# Patient Record
Sex: MALE | Race: WHITE | NOT HISPANIC OR LATINO | Employment: UNEMPLOYED | URBAN - METROPOLITAN AREA
[De-identification: names, ages, dates, MRNs, and addresses within clinical notes are randomized per-mention and may not be internally consistent; named-entity substitution may affect disease eponyms.]

---

## 2019-04-12 ENCOUNTER — HOSPITAL ENCOUNTER (INPATIENT)
Facility: MEDICAL CENTER | Age: 44
LOS: 2 days | DRG: 377 | End: 2019-04-14
Attending: EMERGENCY MEDICINE | Admitting: HOSPITALIST
Payer: COMMERCIAL

## 2019-04-12 DIAGNOSIS — K92.0 HEMATEMESIS WITH NAUSEA: ICD-10-CM

## 2019-04-12 DIAGNOSIS — K92.2 UPPER GI BLEED: ICD-10-CM

## 2019-04-12 LAB
ALBUMIN SERPL BCP-MCNC: 3.2 G/DL (ref 3.2–4.9)
ALBUMIN/GLOB SERPL: 0.9 G/DL
ALP SERPL-CCNC: 75 U/L (ref 30–99)
ALT SERPL-CCNC: 22 U/L (ref 2–50)
ANION GAP SERPL CALC-SCNC: 11 MMOL/L (ref 0–11.9)
AST SERPL-CCNC: 46 U/L (ref 12–45)
BASOPHILS # BLD AUTO: 0.1 % (ref 0–1.8)
BASOPHILS # BLD: 0.01 K/UL (ref 0–0.12)
BILIRUB SERPL-MCNC: 2.6 MG/DL (ref 0.1–1.5)
BUN SERPL-MCNC: 17 MG/DL (ref 8–22)
CALCIUM SERPL-MCNC: 7.8 MG/DL (ref 8.5–10.5)
CHLORIDE SERPL-SCNC: 103 MMOL/L (ref 96–112)
CO2 SERPL-SCNC: 22 MMOL/L (ref 20–33)
CREAT SERPL-MCNC: 0.57 MG/DL (ref 0.5–1.4)
EKG IMPRESSION: NORMAL
EOSINOPHIL # BLD AUTO: 0.01 K/UL (ref 0–0.51)
EOSINOPHIL NFR BLD: 0.1 % (ref 0–6.9)
ERYTHROCYTE [DISTWIDTH] IN BLOOD BY AUTOMATED COUNT: 44.7 FL (ref 35.9–50)
GLOBULIN SER CALC-MCNC: 3.4 G/DL (ref 1.9–3.5)
GLUCOSE SERPL-MCNC: 151 MG/DL (ref 65–99)
HCT VFR BLD AUTO: 31.7 % (ref 42–52)
HGB BLD-MCNC: 11.1 G/DL (ref 14–18)
IMM GRANULOCYTES # BLD AUTO: 0.02 K/UL (ref 0–0.11)
IMM GRANULOCYTES NFR BLD AUTO: 0.2 % (ref 0–0.9)
LIPASE SERPL-CCNC: 24 U/L (ref 11–82)
LYMPHOCYTES # BLD AUTO: 0.59 K/UL (ref 1–4.8)
LYMPHOCYTES NFR BLD: 6.2 % (ref 22–41)
MCH RBC QN AUTO: 29.3 PG (ref 27–33)
MCHC RBC AUTO-ENTMCNC: 35 G/DL (ref 33.7–35.3)
MCV RBC AUTO: 83.6 FL (ref 81.4–97.8)
MONOCYTES # BLD AUTO: 0.35 K/UL (ref 0–0.85)
MONOCYTES NFR BLD AUTO: 3.6 % (ref 0–13.4)
NEUTROPHILS # BLD AUTO: 8.61 K/UL (ref 1.82–7.42)
NEUTROPHILS NFR BLD: 89.8 % (ref 44–72)
NRBC # BLD AUTO: 0 K/UL
NRBC BLD-RTO: 0 /100 WBC
PLATELET # BLD AUTO: 64 K/UL (ref 164–446)
PMV BLD AUTO: 10.7 FL (ref 9–12.9)
POTASSIUM SERPL-SCNC: 4.2 MMOL/L (ref 3.6–5.5)
PROT SERPL-MCNC: 6.6 G/DL (ref 6–8.2)
RBC # BLD AUTO: 3.79 M/UL (ref 4.7–6.1)
SODIUM SERPL-SCNC: 136 MMOL/L (ref 135–145)
WBC # BLD AUTO: 9.6 K/UL (ref 4.8–10.8)

## 2019-04-12 PROCEDURE — 80048 BASIC METABOLIC PNL TOTAL CA: CPT

## 2019-04-12 PROCEDURE — 96368 THER/DIAG CONCURRENT INF: CPT

## 2019-04-12 PROCEDURE — 700111 HCHG RX REV CODE 636 W/ 250 OVERRIDE (IP): Performed by: EMERGENCY MEDICINE

## 2019-04-12 PROCEDURE — 96367 TX/PROPH/DG ADDL SEQ IV INF: CPT

## 2019-04-12 PROCEDURE — 84100 ASSAY OF PHOSPHORUS: CPT

## 2019-04-12 PROCEDURE — 96366 THER/PROPH/DIAG IV INF ADDON: CPT

## 2019-04-12 PROCEDURE — 770020 HCHG ROOM/CARE - TELE (206)

## 2019-04-12 PROCEDURE — 85610 PROTHROMBIN TIME: CPT

## 2019-04-12 PROCEDURE — 85576 BLOOD PLATELET AGGREGATION: CPT

## 2019-04-12 PROCEDURE — 99285 EMERGENCY DEPT VISIT HI MDM: CPT

## 2019-04-12 PROCEDURE — 93005 ELECTROCARDIOGRAM TRACING: CPT

## 2019-04-12 PROCEDURE — C9113 INJ PANTOPRAZOLE SODIUM, VIA: HCPCS | Performed by: EMERGENCY MEDICINE

## 2019-04-12 PROCEDURE — 80053 COMPREHEN METABOLIC PANEL: CPT

## 2019-04-12 PROCEDURE — 85347 COAGULATION TIME ACTIVATED: CPT

## 2019-04-12 PROCEDURE — 93005 ELECTROCARDIOGRAM TRACING: CPT | Performed by: EMERGENCY MEDICINE

## 2019-04-12 PROCEDURE — 85025 COMPLETE CBC W/AUTO DIFF WBC: CPT

## 2019-04-12 PROCEDURE — 83735 ASSAY OF MAGNESIUM: CPT

## 2019-04-12 PROCEDURE — 85384 FIBRINOGEN ACTIVITY: CPT

## 2019-04-12 PROCEDURE — 700105 HCHG RX REV CODE 258: Performed by: EMERGENCY MEDICINE

## 2019-04-12 PROCEDURE — 96365 THER/PROPH/DIAG IV INF INIT: CPT

## 2019-04-12 PROCEDURE — 36415 COLL VENOUS BLD VENIPUNCTURE: CPT

## 2019-04-12 PROCEDURE — 83690 ASSAY OF LIPASE: CPT

## 2019-04-12 PROCEDURE — 85027 COMPLETE CBC AUTOMATED: CPT

## 2019-04-12 RX ORDER — LORAZEPAM 2 MG/ML
0.5 INJECTION INTRAMUSCULAR EVERY 4 HOURS PRN
Status: DISCONTINUED | OUTPATIENT
Start: 2019-04-12 | End: 2019-04-14

## 2019-04-12 RX ORDER — LORAZEPAM 2 MG/ML
1.5 INJECTION INTRAMUSCULAR
Status: DISCONTINUED | OUTPATIENT
Start: 2019-04-12 | End: 2019-04-14

## 2019-04-12 RX ORDER — SODIUM CHLORIDE, SODIUM LACTATE, POTASSIUM CHLORIDE, CALCIUM CHLORIDE 600; 310; 30; 20 MG/100ML; MG/100ML; MG/100ML; MG/100ML
INJECTION, SOLUTION INTRAVENOUS CONTINUOUS
Status: DISCONTINUED | OUTPATIENT
Start: 2019-04-12 | End: 2019-04-14

## 2019-04-12 RX ORDER — LORAZEPAM 2 MG/1
4 TABLET ORAL
Status: DISCONTINUED | OUTPATIENT
Start: 2019-04-12 | End: 2019-04-14

## 2019-04-12 RX ORDER — LORAZEPAM 1 MG/1
1 TABLET ORAL EVERY 4 HOURS PRN
Status: DISCONTINUED | OUTPATIENT
Start: 2019-04-12 | End: 2019-04-14

## 2019-04-12 RX ORDER — LORAZEPAM 2 MG/ML
1 INJECTION INTRAMUSCULAR
Status: DISCONTINUED | OUTPATIENT
Start: 2019-04-12 | End: 2019-04-14

## 2019-04-12 RX ORDER — ONDANSETRON 4 MG/1
4 TABLET, ORALLY DISINTEGRATING ORAL EVERY 4 HOURS PRN
Status: DISCONTINUED | OUTPATIENT
Start: 2019-04-12 | End: 2019-04-14

## 2019-04-12 RX ORDER — POLYETHYLENE GLYCOL 3350 17 G/17G
1 POWDER, FOR SOLUTION ORAL
Status: DISCONTINUED | OUTPATIENT
Start: 2019-04-12 | End: 2019-04-14 | Stop reason: HOSPADM

## 2019-04-12 RX ORDER — PROMETHAZINE HYDROCHLORIDE 25 MG/1
12.5-25 SUPPOSITORY RECTAL EVERY 4 HOURS PRN
Status: DISCONTINUED | OUTPATIENT
Start: 2019-04-12 | End: 2019-04-14

## 2019-04-12 RX ORDER — ONDANSETRON 2 MG/ML
4 INJECTION INTRAMUSCULAR; INTRAVENOUS EVERY 4 HOURS PRN
Status: DISCONTINUED | OUTPATIENT
Start: 2019-04-12 | End: 2019-04-14 | Stop reason: HOSPADM

## 2019-04-12 RX ORDER — LORAZEPAM 1 MG/1
0.5 TABLET ORAL EVERY 4 HOURS PRN
Status: DISCONTINUED | OUTPATIENT
Start: 2019-04-12 | End: 2019-04-14

## 2019-04-12 RX ORDER — LORAZEPAM 2 MG/ML
2 INJECTION INTRAMUSCULAR
Status: DISCONTINUED | OUTPATIENT
Start: 2019-04-12 | End: 2019-04-14

## 2019-04-12 RX ORDER — SODIUM CHLORIDE 9 MG/ML
1000 INJECTION, SOLUTION INTRAVENOUS ONCE
Status: COMPLETED | OUTPATIENT
Start: 2019-04-12 | End: 2019-04-12

## 2019-04-12 RX ORDER — SODIUM CHLORIDE 9 MG/ML
INJECTION, SOLUTION INTRAVENOUS CONTINUOUS
Status: DISPENSED | OUTPATIENT
Start: 2019-04-13 | End: 2019-04-13

## 2019-04-12 RX ORDER — FOLIC ACID 1 MG/1
1 TABLET ORAL DAILY
Status: DISCONTINUED | OUTPATIENT
Start: 2019-04-13 | End: 2019-04-14 | Stop reason: HOSPADM

## 2019-04-12 RX ORDER — AMOXICILLIN 250 MG
2 CAPSULE ORAL 2 TIMES DAILY
Status: DISCONTINUED | OUTPATIENT
Start: 2019-04-12 | End: 2019-04-14 | Stop reason: HOSPADM

## 2019-04-12 RX ORDER — PROMETHAZINE HYDROCHLORIDE 25 MG/1
12.5-25 TABLET ORAL EVERY 4 HOURS PRN
Status: DISCONTINUED | OUTPATIENT
Start: 2019-04-12 | End: 2019-04-14

## 2019-04-12 RX ORDER — LORAZEPAM 2 MG/1
2 TABLET ORAL
Status: DISCONTINUED | OUTPATIENT
Start: 2019-04-12 | End: 2019-04-14

## 2019-04-12 RX ORDER — BISACODYL 10 MG
10 SUPPOSITORY, RECTAL RECTAL
Status: DISCONTINUED | OUTPATIENT
Start: 2019-04-12 | End: 2019-04-14 | Stop reason: HOSPADM

## 2019-04-12 RX ORDER — THIAMINE MONONITRATE (VIT B1) 100 MG
100 TABLET ORAL DAILY
Status: DISCONTINUED | OUTPATIENT
Start: 2019-04-13 | End: 2019-04-14 | Stop reason: HOSPADM

## 2019-04-12 RX ADMIN — SODIUM CHLORIDE 8 MG/HR: 9 INJECTION, SOLUTION INTRAVENOUS at 21:26

## 2019-04-12 RX ADMIN — SODIUM CHLORIDE 1000 ML: 9 INJECTION, SOLUTION INTRAVENOUS at 21:00

## 2019-04-12 RX ADMIN — OCTREOTIDE ACETATE 50 MCG/HR: 200 INJECTION, SOLUTION INTRAVENOUS; SUBCUTANEOUS at 21:25

## 2019-04-12 RX ADMIN — CEFTRIAXONE SODIUM 2 G: 2 INJECTION, POWDER, FOR SOLUTION INTRAMUSCULAR; INTRAVENOUS at 21:01

## 2019-04-12 ASSESSMENT — COGNITIVE AND FUNCTIONAL STATUS - GENERAL
MOBILITY SCORE: 24
SUGGESTED CMS G CODE MODIFIER MOBILITY: CH
DAILY ACTIVITIY SCORE: 24
SUGGESTED CMS G CODE MODIFIER DAILY ACTIVITY: CH

## 2019-04-12 ASSESSMENT — ENCOUNTER SYMPTOMS
HEADACHES: 0
TINGLING: 0
FEVER: 0
CHILLS: 0
VOMITING: 1
ABDOMINAL PAIN: 0
NAUSEA: 1
DIZZINESS: 0

## 2019-04-12 ASSESSMENT — PATIENT HEALTH QUESTIONNAIRE - PHQ9
SUM OF ALL RESPONSES TO PHQ9 QUESTIONS 1 AND 2: 0
2. FEELING DOWN, DEPRESSED, IRRITABLE, OR HOPELESS: NOT AT ALL
1. LITTLE INTEREST OR PLEASURE IN DOING THINGS: NOT AT ALL

## 2019-04-12 ASSESSMENT — COPD QUESTIONNAIRES
DO YOU EVER COUGH UP ANY MUCUS OR PHLEGM?: NO/ONLY WITH OCCASIONAL COLDS OR INFECTIONS
IN THE PAST 12 MONTHS DO YOU DO LESS THAN YOU USED TO BECAUSE OF YOUR BREATHING PROBLEMS: DISAGREE/UNSURE
HAVE YOU SMOKED AT LEAST 100 CIGARETTES IN YOUR ENTIRE LIFE: NO/DON'T KNOW

## 2019-04-12 ASSESSMENT — LIFESTYLE VARIABLES
DO YOU DRINK ALCOHOL: NO
ALCOHOL_USE: NO

## 2019-04-12 NOTE — LETTER
[facility]  TELEMETRY [facility] [address] [place] [address] [facility] [address]  [phone]     April 14, 2019    Patient: Noam Choi   YOB: 1975   Date of Visit: 4/12/2019       To Whom It May Concern:    Noam Choi was seen and treated in our department on 4/12/2019-4/14/2019.Please allow patient 2 days, until 4/17/2019, before returning to work to help his recovery.    Sincerely,     Vicente Quiroga M.D.

## 2019-04-12 NOTE — LETTER
[facility]  [facility]  [address] [facility] [address]  [phone]     April 14, 2019    Patient: Noam Choi   YOB: 1975   Date of Visit: 4/12/2019       To Whom It May Concern:    Noam Choi was seen and treated in our department on 4/12/2019-4/14/2019. Please allow him to return to work after 4/19/2019 to help with his recovery, but if he improves faster than expected he may return to work sooner with activity as tolerated.    Sincerely,     Vicente Quiroga M.D.

## 2019-04-13 ENCOUNTER — ANESTHESIA EVENT (OUTPATIENT)
Dept: SURGERY | Facility: MEDICAL CENTER | Age: 44
DRG: 377 | End: 2019-04-13
Payer: COMMERCIAL

## 2019-04-13 ENCOUNTER — ANESTHESIA (OUTPATIENT)
Dept: SURGERY | Facility: MEDICAL CENTER | Age: 44
DRG: 377 | End: 2019-04-13
Payer: COMMERCIAL

## 2019-04-13 PROBLEM — D50.0 NORMOCYTIC ANEMIA DUE TO BLOOD LOSS: Status: ACTIVE | Noted: 2019-04-13

## 2019-04-13 PROBLEM — E83.42 HYPOMAGNESEMIA: Status: ACTIVE | Noted: 2019-04-13

## 2019-04-13 PROBLEM — D69.6 THROMBOCYTOPENIA (HCC): Status: ACTIVE | Noted: 2019-04-13

## 2019-04-13 PROBLEM — F10.939 WITHDRAWAL SYMPTOMS, ALCOHOL (HCC): Status: ACTIVE | Noted: 2019-04-13

## 2019-04-13 PROBLEM — F10.10 ALCOHOL ABUSE: Status: ACTIVE | Noted: 2019-04-13

## 2019-04-13 PROBLEM — K92.2 GI BLEED: Status: ACTIVE | Noted: 2019-04-13

## 2019-04-13 LAB
ABO GROUP BLD: NORMAL
ABO GROUP BLD: NORMAL
ANION GAP SERPL CALC-SCNC: 10 MMOL/L (ref 0–11.9)
BARCODED ABORH UBTYP: 6200
BARCODED PRD CODE UBPRD: NORMAL
BARCODED UNIT NUM UBUNT: NORMAL
BLD GP AB SCN SERPL QL: NORMAL
BUN SERPL-MCNC: 17 MG/DL (ref 8–22)
CALCIUM SERPL-MCNC: 7.1 MG/DL (ref 8.5–10.5)
CFT BLD TEG: 6.2 MIN (ref 5–10)
CHLORIDE SERPL-SCNC: 107 MMOL/L (ref 96–112)
CLOT ANGLE BLD TEG: 52.9 DEGREES (ref 53–72)
CLOT LYSIS 30M P MA LENFR BLD TEG: 0 % (ref 0–8)
CO2 SERPL-SCNC: 21 MMOL/L (ref 20–33)
COMPONENT P 8504P: NORMAL
CREAT SERPL-MCNC: 0.56 MG/DL (ref 0.5–1.4)
CT.EXTRINSIC BLD ROTEM: 3.8 MIN (ref 1–3)
ERYTHROCYTE [DISTWIDTH] IN BLOOD BY AUTOMATED COUNT: 45.2 FL (ref 35.9–50)
ERYTHROCYTE [DISTWIDTH] IN BLOOD BY AUTOMATED COUNT: 47.1 FL (ref 35.9–50)
ERYTHROCYTE [DISTWIDTH] IN BLOOD BY AUTOMATED COUNT: 48.6 FL (ref 35.9–50)
ERYTHROCYTE [DISTWIDTH] IN BLOOD BY AUTOMATED COUNT: 49.2 FL (ref 35.9–50)
GLUCOSE SERPL-MCNC: 122 MG/DL (ref 65–99)
HCT VFR BLD AUTO: 24.6 % (ref 42–52)
HCT VFR BLD AUTO: 25.6 % (ref 42–52)
HCT VFR BLD AUTO: 26.3 % (ref 42–52)
HCT VFR BLD AUTO: 27.9 % (ref 42–52)
HGB BLD-MCNC: 8.5 G/DL (ref 14–18)
HGB BLD-MCNC: 8.8 G/DL (ref 14–18)
HGB BLD-MCNC: 9 G/DL (ref 14–18)
HGB BLD-MCNC: 9.6 G/DL (ref 14–18)
INR PPP: 1.8 (ref 0.87–1.13)
MAGNESIUM SERPL-MCNC: 1.4 MG/DL (ref 1.5–2.5)
MCF BLD TEG: 44.9 MM (ref 50–70)
MCH RBC QN AUTO: 28.9 PG (ref 27–33)
MCH RBC QN AUTO: 29.4 PG (ref 27–33)
MCH RBC QN AUTO: 29.5 PG (ref 27–33)
MCH RBC QN AUTO: 29.9 PG (ref 27–33)
MCHC RBC AUTO-ENTMCNC: 34.2 G/DL (ref 33.7–35.3)
MCHC RBC AUTO-ENTMCNC: 34.4 G/DL (ref 33.7–35.3)
MCHC RBC AUTO-ENTMCNC: 34.4 G/DL (ref 33.7–35.3)
MCHC RBC AUTO-ENTMCNC: 34.6 G/DL (ref 33.7–35.3)
MCV RBC AUTO: 84 FL (ref 81.4–97.8)
MCV RBC AUTO: 85.4 FL (ref 81.4–97.8)
MCV RBC AUTO: 85.9 FL (ref 81.4–97.8)
MCV RBC AUTO: 87.1 FL (ref 81.4–97.8)
PHOSPHATE SERPL-MCNC: 2.5 MG/DL (ref 2.5–4.5)
PLATELET # BLD AUTO: 47 K/UL (ref 164–446)
PLATELET # BLD AUTO: 55 K/UL (ref 164–446)
PLATELET # BLD AUTO: 61 K/UL (ref 164–446)
PLATELET # BLD AUTO: 65 K/UL (ref 164–446)
PMV BLD AUTO: 10 FL (ref 9–12.9)
PMV BLD AUTO: 10 FL (ref 9–12.9)
PMV BLD AUTO: 10.1 FL (ref 9–12.9)
PMV BLD AUTO: 10.7 FL (ref 9–12.9)
POTASSIUM SERPL-SCNC: 4.1 MMOL/L (ref 3.6–5.5)
PRODUCT TYPE UPROD: NORMAL
PROTHROMBIN TIME: 21 SEC (ref 12–14.6)
RBC # BLD AUTO: 2.88 M/UL (ref 4.7–6.1)
RBC # BLD AUTO: 2.94 M/UL (ref 4.7–6.1)
RBC # BLD AUTO: 3.06 M/UL (ref 4.7–6.1)
RBC # BLD AUTO: 3.32 M/UL (ref 4.7–6.1)
RH BLD: NORMAL
RH BLD: NORMAL
SODIUM SERPL-SCNC: 138 MMOL/L (ref 135–145)
TEG ALGORITHM TGALG: ABNORMAL
UNIT STATUS USTAT: NORMAL
WBC # BLD AUTO: 5.3 K/UL (ref 4.8–10.8)
WBC # BLD AUTO: 5.6 K/UL (ref 4.8–10.8)
WBC # BLD AUTO: 5.9 K/UL (ref 4.8–10.8)
WBC # BLD AUTO: 6.8 K/UL (ref 4.8–10.8)

## 2019-04-13 PROCEDURE — 86850 RBC ANTIBODY SCREEN: CPT

## 2019-04-13 PROCEDURE — 700105 HCHG RX REV CODE 258: Performed by: INTERNAL MEDICINE

## 2019-04-13 PROCEDURE — HZ2ZZZZ DETOXIFICATION SERVICES FOR SUBSTANCE ABUSE TREATMENT: ICD-10-PCS | Performed by: HOSPITALIST

## 2019-04-13 PROCEDURE — 160009 HCHG ANES TIME/MIN: Performed by: INTERNAL MEDICINE

## 2019-04-13 PROCEDURE — 700105 HCHG RX REV CODE 258: Performed by: ANESTHESIOLOGY

## 2019-04-13 PROCEDURE — A9270 NON-COVERED ITEM OR SERVICE: HCPCS | Performed by: STUDENT IN AN ORGANIZED HEALTH CARE EDUCATION/TRAINING PROGRAM

## 2019-04-13 PROCEDURE — 84100 ASSAY OF PHOSPHORUS: CPT

## 2019-04-13 PROCEDURE — 700111 HCHG RX REV CODE 636 W/ 250 OVERRIDE (IP): Performed by: STUDENT IN AN ORGANIZED HEALTH CARE EDUCATION/TRAINING PROGRAM

## 2019-04-13 PROCEDURE — 83735 ASSAY OF MAGNESIUM: CPT

## 2019-04-13 PROCEDURE — P9034 PLATELETS, PHERESIS: HCPCS

## 2019-04-13 PROCEDURE — 700101 HCHG RX REV CODE 250: Performed by: STUDENT IN AN ORGANIZED HEALTH CARE EDUCATION/TRAINING PROGRAM

## 2019-04-13 PROCEDURE — 30233R1 TRANSFUSION OF NONAUTOLOGOUS PLATELETS INTO PERIPHERAL VEIN, PERCUTANEOUS APPROACH: ICD-10-PCS | Performed by: HOSPITALIST

## 2019-04-13 PROCEDURE — 0DL48ZZ OCCLUSION OF ESOPHAGOGASTRIC JUNCTION, VIA NATURAL OR ARTIFICIAL OPENING ENDOSCOPIC: ICD-10-PCS | Performed by: INTERNAL MEDICINE

## 2019-04-13 PROCEDURE — 160035 HCHG PACU - 1ST 60 MINS PHASE I: Performed by: INTERNAL MEDICINE

## 2019-04-13 PROCEDURE — 80053 COMPREHEN METABOLIC PANEL: CPT

## 2019-04-13 PROCEDURE — 94760 N-INVAS EAR/PLS OXIMETRY 1: CPT

## 2019-04-13 PROCEDURE — 160048 HCHG OR STATISTICAL LEVEL 1-5: Performed by: INTERNAL MEDICINE

## 2019-04-13 PROCEDURE — 700111 HCHG RX REV CODE 636 W/ 250 OVERRIDE (IP): Performed by: ANESTHESIOLOGY

## 2019-04-13 PROCEDURE — 86900 BLOOD TYPING SEROLOGIC ABO: CPT

## 2019-04-13 PROCEDURE — 700105 HCHG RX REV CODE 258: Performed by: EMERGENCY MEDICINE

## 2019-04-13 PROCEDURE — 700105 HCHG RX REV CODE 258: Performed by: STUDENT IN AN ORGANIZED HEALTH CARE EDUCATION/TRAINING PROGRAM

## 2019-04-13 PROCEDURE — 700111 HCHG RX REV CODE 636 W/ 250 OVERRIDE (IP): Performed by: EMERGENCY MEDICINE

## 2019-04-13 PROCEDURE — 700111 HCHG RX REV CODE 636 W/ 250 OVERRIDE (IP): Performed by: INTERNAL MEDICINE

## 2019-04-13 PROCEDURE — 36415 COLL VENOUS BLD VENIPUNCTURE: CPT

## 2019-04-13 PROCEDURE — 770020 HCHG ROOM/CARE - TELE (206)

## 2019-04-13 PROCEDURE — 700102 HCHG RX REV CODE 250 W/ 637 OVERRIDE(OP): Performed by: STUDENT IN AN ORGANIZED HEALTH CARE EDUCATION/TRAINING PROGRAM

## 2019-04-13 PROCEDURE — 502240 HCHG MISC OR SUPPLY RC 0272: Performed by: INTERNAL MEDICINE

## 2019-04-13 PROCEDURE — C9113 INJ PANTOPRAZOLE SODIUM, VIA: HCPCS | Performed by: EMERGENCY MEDICINE

## 2019-04-13 PROCEDURE — 500066 HCHG BITE BLOCK, ECT: Performed by: INTERNAL MEDICINE

## 2019-04-13 PROCEDURE — 160036 HCHG PACU - EA ADDL 30 MINS PHASE I: Performed by: INTERNAL MEDICINE

## 2019-04-13 PROCEDURE — 160029 HCHG SURGERY MINUTES - 1ST 30 MINS LEVEL 4: Performed by: INTERNAL MEDICINE

## 2019-04-13 PROCEDURE — 85027 COMPLETE CBC AUTOMATED: CPT | Mod: 91

## 2019-04-13 PROCEDURE — 160002 HCHG RECOVERY MINUTES (STAT): Performed by: INTERNAL MEDICINE

## 2019-04-13 PROCEDURE — 36430 TRANSFUSION BLD/BLD COMPNT: CPT

## 2019-04-13 PROCEDURE — 700101 HCHG RX REV CODE 250: Performed by: ANESTHESIOLOGY

## 2019-04-13 PROCEDURE — 99223 1ST HOSP IP/OBS HIGH 75: CPT | Mod: GC | Performed by: HOSPITALIST

## 2019-04-13 PROCEDURE — 86901 BLOOD TYPING SEROLOGIC RH(D): CPT | Mod: 91

## 2019-04-13 RX ORDER — MIDAZOLAM HYDROCHLORIDE 1 MG/ML
1 INJECTION INTRAMUSCULAR; INTRAVENOUS
Status: DISCONTINUED | OUTPATIENT
Start: 2019-04-13 | End: 2019-04-13 | Stop reason: HOSPADM

## 2019-04-13 RX ORDER — HYDRALAZINE HYDROCHLORIDE 20 MG/ML
5 INJECTION INTRAMUSCULAR; INTRAVENOUS
Status: DISCONTINUED | OUTPATIENT
Start: 2019-04-13 | End: 2019-04-13 | Stop reason: HOSPADM

## 2019-04-13 RX ORDER — MEPERIDINE HYDROCHLORIDE 25 MG/ML
12.5 INJECTION INTRAMUSCULAR; INTRAVENOUS; SUBCUTANEOUS
Status: DISCONTINUED | OUTPATIENT
Start: 2019-04-13 | End: 2019-04-13 | Stop reason: HOSPADM

## 2019-04-13 RX ORDER — ONDANSETRON 2 MG/ML
4 INJECTION INTRAMUSCULAR; INTRAVENOUS
Status: DISCONTINUED | OUTPATIENT
Start: 2019-04-13 | End: 2019-04-13 | Stop reason: HOSPADM

## 2019-04-13 RX ORDER — SODIUM CHLORIDE, SODIUM LACTATE, POTASSIUM CHLORIDE, CALCIUM CHLORIDE 600; 310; 30; 20 MG/100ML; MG/100ML; MG/100ML; MG/100ML
INJECTION, SOLUTION INTRAVENOUS CONTINUOUS
Status: DISCONTINUED | OUTPATIENT
Start: 2019-04-13 | End: 2019-04-13 | Stop reason: HOSPADM

## 2019-04-13 RX ORDER — MAGNESIUM SULFATE HEPTAHYDRATE 40 MG/ML
4 INJECTION, SOLUTION INTRAVENOUS ONCE
Status: COMPLETED | OUTPATIENT
Start: 2019-04-13 | End: 2019-04-13

## 2019-04-13 RX ORDER — HALOPERIDOL 5 MG/ML
1 INJECTION INTRAMUSCULAR
Status: DISCONTINUED | OUTPATIENT
Start: 2019-04-13 | End: 2019-04-13 | Stop reason: HOSPADM

## 2019-04-13 RX ORDER — SODIUM CHLORIDE, SODIUM LACTATE, POTASSIUM CHLORIDE, CALCIUM CHLORIDE 600; 310; 30; 20 MG/100ML; MG/100ML; MG/100ML; MG/100ML
INJECTION, SOLUTION INTRAVENOUS
Status: DISCONTINUED | OUTPATIENT
Start: 2019-04-13 | End: 2019-04-13 | Stop reason: SURG

## 2019-04-13 RX ORDER — ONDANSETRON 2 MG/ML
INJECTION INTRAMUSCULAR; INTRAVENOUS PRN
Status: DISCONTINUED | OUTPATIENT
Start: 2019-04-13 | End: 2019-04-13 | Stop reason: SURG

## 2019-04-13 RX ORDER — DIPHENHYDRAMINE HYDROCHLORIDE 50 MG/ML
12.5 INJECTION INTRAMUSCULAR; INTRAVENOUS
Status: DISCONTINUED | OUTPATIENT
Start: 2019-04-13 | End: 2019-04-13 | Stop reason: HOSPADM

## 2019-04-13 RX ADMIN — SODIUM CHLORIDE: 9 INJECTION, SOLUTION INTRAVENOUS at 01:08

## 2019-04-13 RX ADMIN — MAGNESIUM SULFATE IN WATER 4 G: 40 INJECTION, SOLUTION INTRAVENOUS at 08:24

## 2019-04-13 RX ADMIN — PROPOFOL 50 MG: 10 INJECTION, EMULSION INTRAVENOUS at 10:30

## 2019-04-13 RX ADMIN — PROPOFOL 80 MG: 10 INJECTION, EMULSION INTRAVENOUS at 10:25

## 2019-04-13 RX ADMIN — ONDANSETRON 4 MG: 2 INJECTION INTRAMUSCULAR; INTRAVENOUS at 20:18

## 2019-04-13 RX ADMIN — DIBASIC SODIUM PHOSPHATE, MONOBASIC POTASSIUM PHOSPHATE AND MONOBASIC SODIUM PHOSPHATE 2 TABLET: 852; 155; 130 TABLET ORAL at 08:21

## 2019-04-13 RX ADMIN — PROPOFOL 100 MG: 10 INJECTION, EMULSION INTRAVENOUS at 10:26

## 2019-04-13 RX ADMIN — SODIUM CHLORIDE, POTASSIUM CHLORIDE, SODIUM LACTATE AND CALCIUM CHLORIDE: 600; 310; 30; 20 INJECTION, SOLUTION INTRAVENOUS at 08:14

## 2019-04-13 RX ADMIN — OCTREOTIDE ACETATE 50 MCG/HR: 200 INJECTION, SOLUTION INTRAVENOUS; SUBCUTANEOUS at 23:33

## 2019-04-13 RX ADMIN — THIAMINE HYDROCHLORIDE: 100 INJECTION, SOLUTION INTRAMUSCULAR; INTRAVENOUS at 00:43

## 2019-04-13 RX ADMIN — DIBASIC SODIUM PHOSPHATE, MONOBASIC POTASSIUM PHOSPHATE AND MONOBASIC SODIUM PHOSPHATE 2 TABLET: 852; 155; 130 TABLET ORAL at 17:37

## 2019-04-13 RX ADMIN — THERA TABS 1 TABLET: TAB at 05:28

## 2019-04-13 RX ADMIN — CEFTRIAXONE SODIUM 2 G: 2 INJECTION, POWDER, FOR SOLUTION INTRAMUSCULAR; INTRAVENOUS at 20:16

## 2019-04-13 RX ADMIN — LORAZEPAM 1 MG: 1 TABLET ORAL at 20:17

## 2019-04-13 RX ADMIN — SODIUM CHLORIDE, POTASSIUM CHLORIDE, SODIUM LACTATE AND CALCIUM CHLORIDE: 600; 310; 30; 20 INJECTION, SOLUTION INTRAVENOUS at 10:00

## 2019-04-13 RX ADMIN — SODIUM CHLORIDE, POTASSIUM CHLORIDE, SODIUM LACTATE AND CALCIUM CHLORIDE: 600; 310; 30; 20 INJECTION, SOLUTION INTRAVENOUS at 00:43

## 2019-04-13 RX ADMIN — OCTREOTIDE ACETATE 50 MCG/HR: 200 INJECTION, SOLUTION INTRAVENOUS; SUBCUTANEOUS at 08:13

## 2019-04-13 RX ADMIN — ONDANSETRON 4 MG: 2 INJECTION INTRAMUSCULAR; INTRAVENOUS at 10:33

## 2019-04-13 RX ADMIN — FENTANYL CITRATE 50 MCG: 50 INJECTION, SOLUTION INTRAMUSCULAR; INTRAVENOUS at 11:14

## 2019-04-13 RX ADMIN — SODIUM CHLORIDE 8 MG/HR: 9 INJECTION, SOLUTION INTRAVENOUS at 17:59

## 2019-04-13 RX ADMIN — Medication 100 MG: at 05:28

## 2019-04-13 RX ADMIN — PROPOFOL 50 MG: 10 INJECTION, EMULSION INTRAVENOUS at 10:27

## 2019-04-13 RX ADMIN — FENTANYL CITRATE 50 MCG: 50 INJECTION, SOLUTION INTRAMUSCULAR; INTRAVENOUS at 11:10

## 2019-04-13 RX ADMIN — FOLIC ACID 1 MG: 1 TABLET ORAL at 05:28

## 2019-04-13 ASSESSMENT — LIFESTYLE VARIABLES
PAROXYSMAL SWEATS: BARELY PERCEPTIBLE SWEATING. PALMS MOIST
AUDITORY DISTURBANCES: NOT PRESENT
ORIENTATION AND CLOUDING OF SENSORIUM: ORIENTED AND CAN DO SERIAL ADDITIONS
TOTAL SCORE: 1
AUDITORY DISTURBANCES: NOT PRESENT
VISUAL DISTURBANCES: NOT PRESENT
VISUAL DISTURBANCES: NOT PRESENT
AGITATION: NORMAL ACTIVITY
ORIENTATION AND CLOUDING OF SENSORIUM: ORIENTED AND CAN DO SERIAL ADDITIONS
EVER_SMOKED: NEVER
VISUAL DISTURBANCES: NOT PRESENT
HEADACHE, FULLNESS IN HEAD: MILD
NAUSEA AND VOMITING: *
AUDITORY DISTURBANCES: NOT PRESENT
ORIENTATION AND CLOUDING OF SENSORIUM: ORIENTED AND CAN DO SERIAL ADDITIONS
NAUSEA AND VOMITING: *
PAROXYSMAL SWEATS: NO SWEAT VISIBLE
AGITATION: NORMAL ACTIVITY
NAUSEA AND VOMITING: *
VISUAL DISTURBANCES: NOT PRESENT
TOTAL SCORE: 3
HEADACHE, FULLNESS IN HEAD: NOT PRESENT
TREMOR: NO TREMOR
PAROXYSMAL SWEATS: NO SWEAT VISIBLE
ANXIETY: NO ANXIETY (AT EASE)
TREMOR: NO TREMOR
ANXIETY: NO ANXIETY (AT EASE)
HEADACHE, FULLNESS IN HEAD: NOT PRESENT
AUDITORY DISTURBANCES: NOT PRESENT
TOTAL SCORE: 2
ANXIETY: NO ANXIETY (AT EASE)
NAUSEA AND VOMITING: MILD NAUSEA WITH NO VOMITING
PAROXYSMAL SWEATS: NO SWEAT VISIBLE
TOTAL SCORE: 9
TREMOR: NO TREMOR
AGITATION: NORMAL ACTIVITY
AGITATION: NORMAL ACTIVITY
HEADACHE, FULLNESS IN HEAD: NOT PRESENT
TREMOR: NO TREMOR
ORIENTATION AND CLOUDING OF SENSORIUM: ORIENTED AND CAN DO SERIAL ADDITIONS
ANXIETY: MODERATELY ANXIOUS OR GUARDED, SO ANXIETY IS INFERRED

## 2019-04-13 ASSESSMENT — COPD QUESTIONNAIRES
COPD SCREENING SCORE: 1
HAVE YOU SMOKED AT LEAST 100 CIGARETTES IN YOUR ENTIRE LIFE: NO/DON'T KNOW
DO YOU EVER COUGH UP ANY MUCUS OR PHLEGM?: NO/ONLY WITH OCCASIONAL COLDS OR INFECTIONS
DURING THE PAST 4 WEEKS HOW MUCH DID YOU FEEL SHORT OF BREATH: NONE/LITTLE OF THE TIME

## 2019-04-13 ASSESSMENT — ENCOUNTER SYMPTOMS
POLYDIPSIA: 0
ROS GI COMMENTS: HEMATEMESIS
CHILLS: 0
DIZZINESS: 0
FEVER: 0
VOMITING: 1
BLOOD IN STOOL: 0
DEPRESSION: 0
HEADACHES: 0
NERVOUS/ANXIOUS: 0
DOUBLE VISION: 0
CONSTIPATION: 0
NAUSEA: 1
BRUISES/BLEEDS EASILY: 0
SHORTNESS OF BREATH: 0
BLURRED VISION: 0
DIARRHEA: 0
MYALGIAS: 0
PALPITATIONS: 0
COUGH: 0

## 2019-04-13 ASSESSMENT — PAIN SCALES - GENERAL: PAIN_LEVEL: 0

## 2019-04-13 NOTE — ASSESSMENT & PLAN NOTE
- no hx of alcohol withdrawal seizures or delirium tremens, 3 years sober but relapse for the past 2 weeks, 3-4 1/2 glasses of hard liquor per day  - Multivitamins IV, p.o. Multivitamins/thiamine/folate  - resolved. no further s/s of withdrawal. dc'ed SHERI.  - counseled cessation.

## 2019-04-13 NOTE — ASSESSMENT & PLAN NOTE
-hematemesis, melena, decreasing Hg, thrombocytopenia, recent history of alcohol relapse  -GI following, recs included in plan  -EGD, found 3 chains of esophageal varices, performed banding x2 with varices flattening out    -H & H post EGD stable. GI cleared him for discharge on pantoprazole 40 bid x 8 weeks, follow up with outpatient GI in 6 weeks.  -continue oral bactrim for GI bleed in the setting of cirrhosis (total Abx 7 days).

## 2019-04-13 NOTE — ED PROVIDER NOTES
ED Provider Note    CHIEF COMPLAINT  Chief Complaint   Patient presents with   • Blood in Vomit     pt tx from [facility] c/o vomitting mahendra red blood and having melana in stool for x1 day. pt has hx of GI bleeds. pt received the following from sending facility: 25mg Phenergan IM, 8 zofran, 80mg protonix, 200 octreotide, and 1.5L NS. pt is aa/ox4 +CMS       HPI  Noam Choi is a 43 y.o. male who presents with history of alcoholic liver cirrhosis transferred from outside hospital for concern of acute upper GI bleed.  Prior to transfer patient was given pantoprazole and octreotide IV.  His hemoglobin was 13.43.  Patient reports that he has been sober for the last 4 years but last week began to drink again, he reports that he had his last drink around 3 days ago.  He reports that today he woke up with multiple episodes of hematemesis, bright red blood.  He also reports black stool.  He was fecal occult positive from below at [facility].  Patient denies any associated abdominal pain.  He denies any history of varices that he knows of.  He has a gastroenterologist in [place].  He denies any fevers or constitutional symptoms.    REVIEW OF SYSTEMS  Review of Systems   Constitutional: Negative for chills, fever and malaise/fatigue.   Cardiovascular: Negative for chest pain.   Gastrointestinal: Positive for melena, nausea and vomiting. Negative for abdominal pain.   Neurological: Negative for dizziness, tingling and headaches.       See HPI for further details. All other systems are negative.     PAST MEDICAL HISTORY   has a past medical history of Anemia; ETOH abuse; GI bleed; Liver disease; and Pancreatitis.    SOCIAL HISTORY  Social History     Social History Main Topics   • Smoking status: Not on file   • Smokeless tobacco: Never Used   • Alcohol use No      Comment: former   • Drug use: No   • Sexual activity: Not on file       SURGICAL HISTORY   has a past surgical history that includes  hernia repair and other.    CURRENT MEDICATIONS  Home Medications     Reviewed by Tejal Gonzalez R.N. (Registered Nurse) on 04/12/19 at 2008  Med List Status: Partial   Medication Last Dose Status        Patient Emile Taking any Medications                       ALLERGIES  No Known Allergies    PHYSICAL EXAM  Physical Exam   Constitutional: He is oriented to person, place, and time. He appears well-developed and well-nourished.   HENT:   Head: Normocephalic and atraumatic.   Dried blood on patient's lips   Eyes: Pupils are equal, round, and reactive to light. Conjunctivae are normal.   Neck: Normal range of motion. Neck supple.   Cardiovascular: Normal rate and regular rhythm.  Exam reveals no gallop and no friction rub.    No murmur heard.  Pulmonary/Chest: Effort normal and breath sounds normal. No respiratory distress. He has no wheezes.   Abdominal: Soft. Bowel sounds are normal. He exhibits no distension. There is no tenderness. There is no rebound.   Neurological: He is alert and oriented to person, place, and time.   Skin: Skin is warm and dry.   Psychiatric: He has a normal mood and affect. His behavior is normal.         DIAGNOSTIC STUDIES / PROCEDURES    EKG sinus tachycardia no ST changes consistent with acute visual ischemia    LABS  Results for orders placed or performed during the hospital encounter of 04/12/19   CBC WITH DIFFERENTIAL   Result Value Ref Range    WBC 9.6 4.8 - 10.8 K/uL    RBC 3.79 (L) 4.70 - 6.10 M/uL    Hemoglobin 11.1 (L) 14.0 - 18.0 g/dL    Hematocrit 31.7 (L) 42.0 - 52.0 %    MCV 83.6 81.4 - 97.8 fL    MCH 29.3 27.0 - 33.0 pg    MCHC 35.0 33.7 - 35.3 g/dL    RDW 44.7 35.9 - 50.0 fL    Platelet Count 64 (L) 164 - 446 K/uL    MPV 10.7 9.0 - 12.9 fL    Neutrophils-Polys 89.80 (H) 44.00 - 72.00 %    Lymphocytes 6.20 (L) 22.00 - 41.00 %    Monocytes 3.60 0.00 - 13.40 %    Eosinophils 0.10 0.00 - 6.90 %    Basophils 0.10 0.00 - 1.80 %    Immature Granulocytes 0.20 0.00 - 0.90 %     Nucleated RBC 0.00 /100 WBC    Neutrophils (Absolute) 8.61 (H) 1.82 - 7.42 K/uL    Lymphs (Absolute) 0.59 (L) 1.00 - 4.80 K/uL    Monos (Absolute) 0.35 0.00 - 0.85 K/uL    Eos (Absolute) 0.01 0.00 - 0.51 K/uL    Baso (Absolute) 0.01 0.00 - 0.12 K/uL    Immature Granulocytes (abs) 0.02 0.00 - 0.11 K/uL    NRBC (Absolute) 0.00 K/uL   EKG   Result Value Ref Range    Report       [facility] Emergency Dept.    Test Date:  2019  Pt Name:    JOHNNY FERREIRA              Department: ER  MRN:        [id number]                      Room:        08  Gender:     Male                         Technician: [id number]  :        1975                   Requested By:ER TRIAGE PROTOCOL  Order #:    [id number]                    Reading MD:    Measurements  Intervals                                Axis  Rate:       125                          P:          70  FL:         121                          QRS:        43  QRSD:       85                           T:          34  QT:         322  QTc:        465    Interpretive Statements  Sinus tachycardia  Abnormal R-wave progression, early transition  No previous ECG available for comparison         COURSE & MEDICAL DECISION MAKING  Pertinent Labs & Imaging studies reviewed. (See chart for details)  Patient here with concern of acute upper GI bleed, alcoholic gastritis versus ulcer versus variceal bleed.  His hemoglobin is 11.1 down from 13.43 earlier today, this is likely partially delusional as patient has received multiple liters of normal saline.  Patient will be placed on octreotide drip and pantoprazole drip.  I will discussed the case with gastroenterology.  Patient reports he was seen once by a gastroenterologist in the area but is unsure who this doctor is, unsure where the clinic is located and unsure if it [facility].  He reports he saw them only once around 6-8 years ago.  Patient's abdominal exam is entirely  benign, I believe that the surgical pathology is unlikely.  Given patient's upper GI bleed will give ceftriaxone.  Given patient's tachycardia will give IV fluids.  He does not display any evidence of withdrawal at this point.  Patient with upper GI bleed.  I discussed the case with gastroenterology who will see patient.  Patient hemoglobin is in the acceptable range, he is tachycardic without any hypotension.  It would be safer for.  I discussed the case with [facility].  The patient will not drink alcohol nor drive with pres[facility]riAssociates bed medications. The patient will return for worsening symptoms and is stable at the time of discharge. The patient verbalizes understanding and will comply.    FINAL IMPRESSION  1.  Acute upper GI bleed         Electronically signed by: Fermin Hernandez, 4/12/2019 8:39 PM

## 2019-04-13 NOTE — PROGRESS NOTES
Pt brought to the floor from the ED. Monitor place on pt, monitor room notified. Pt able to ambulate from gurney to bed standby assist with steady gait. POC discussed with pt, all questions answered at this time. Pt is A/O x4.

## 2019-04-13 NOTE — PROGRESS NOTES
CROSS COVER:  Plt 55, TEG demonstrates decreased max clot strength, active bleeding.  Plts 1 u released.

## 2019-04-13 NOTE — PROGRESS NOTES
Internal Medicine Interval Note  Note Author: Vicente Quiroga M.D.     Name Noam Choi       1975   Age/Sex 43 y.o. male   MRN [id number]   Code Status Full code     After 5PM or if no immediate response to page, please call for cross-coverage  Attending/Team: Satnam/Richardson See Patient List for primary contact information  Call [phone] to page    1st Call - Day Intern (R1):   Quiroga 2nd Call - Day Sr. Resident (R2/R3):   Win         Reason for interval visit  (Principal Problem)   GI Bleed, Cirrohsis      Interval Problem Daily Status Update  (24 hours, problem oriented, brief subjective history, new lab/imaging data pertinent to that problem)   -patient presented overnight with GI bleed, will receive EGD today by GI    -GI performed EGD, found 3 chains of esophageal varices, performed banding x2 with varices flattening out  -continue IV PPI for 72hr from admit, IV octreotide      Review of Systems   Constitutional: Negative for chills and fever.   HENT: Negative for ear pain and hearing loss.    Eyes: Negative for blurred vision and double vision.   Respiratory: Negative for cough and shortness of breath.    Cardiovascular: Negative for chest pain and palpitations.   Gastrointestinal: Positive for nausea and vomiting. Negative for blood in stool, constipation, diarrhea and melena.        Hematemesis   Genitourinary: Negative for dysuria and hematuria.   Musculoskeletal: Negative for joint pain and myalgias.   Skin: Negative for itching and rash.   Neurological: Negative for dizziness and headaches.   Endo/Heme/Allergies: Negative for polydipsia. Does not bruise/bleed easily.   Psychiatric/Behavioral: Negative for depression. The patient is not nervous/anxious.        Disposition/Barriers to discharge:   Clinical improvement    Consultants/Specialty  Gastroenterology  PCP: No primary care provider on file.      Quality Measures  Quality-Core Measures   Reviewed items::  Labs reviewed and  Medications reviewed  Humphrey catheter::  No Humphrey  DVT prophylaxis - mechanical:  SCDs          Physical Exam       Vitals:    04/13/19 1200 04/13/19 1215 04/13/19 1230 04/13/19 1245   BP: 130/75 130/75 129/74 128/74   Pulse: 90 91 93 93   Resp: (!) 24 18 16 16   Temp: 37.4 °C (99.4 °F) 37.3 °C (99.1 °F) 37.3 °C (99.2 °F) 37.4 °C (99.3 °F)   TempSrc: Temporal Temporal Temporal Temporal   SpO2: 98% 98% 93% 98%   Weight:       Height:         Body mass index is 30.72 kg/m². Weight: 97.1 kg (214 lb 1.1 oz)  Oxygen Therapy:  Pulse Oximetry: 98 %, O2 (LPM): 0, O2 Delivery: None (Room Air)    Physical Exam   Constitutional: He is oriented to person, place, and time and well-developed, well-nourished, and in no distress. No distress.   HENT:   Head: Normocephalic and atraumatic.   Mouth/Throat: Oropharynx is clear and moist. No oropharyngeal exudate.   Eyes: Pupils are equal, round, and reactive to light. Conjunctivae and EOM are normal. Right eye exhibits no discharge. Left eye exhibits no discharge. No scleral icterus.   Neck: Normal range of motion. Neck supple. No tracheal deviation present.   Cardiovascular: Normal rate, regular rhythm, normal heart sounds and intact distal pulses.  Exam reveals no gallop and no friction rub.    No murmur heard.  Pulmonary/Chest: Effort normal and breath sounds normal. No respiratory distress. He has no wheezes. He has no rales. He exhibits no tenderness.   Abdominal: Soft. Bowel sounds are normal. He exhibits no distension and no mass. There is no tenderness. There is no rebound and no guarding.   Musculoskeletal: Normal range of motion. He exhibits no edema, tenderness or deformity.   Neurological: He is alert and oriented to person, place, and time. He exhibits normal muscle tone. Coordination normal.   Skin: Skin is warm and dry. No rash noted. He is not diaphoretic. No erythema. No pallor.   Psychiatric: Mood and affect normal.             Assessment/Plan     * GI bleed  "  Assessment & Plan    -hematemesis, melena, decreasing Hg, thrombocytopenia, recent history of alcohol relapse  -GI following, recs included in plan  -EGD, found 3 chains of esophageal varices, performed banding x2 with varices flattening out    -IVF as necessary  -H/H monitor, transfuse for <7  -continue IV PPI for 72hr from admit, IV octreotide  -per GI, outpt f/u with labs q3mo for MELD-Na (CBC, CMP, and PT/INR); US screening for hepatocellular carcinoma q6mo       Hypomagnesemia   Assessment & Plan    -monitor and replete     Thrombocytopenia (HCC)   Assessment & Plan    -likely related to blood loss, possibly multifactorial with hx of alcoholism; severe  -received 1ut of platelets    -monitor for bleed, transfuse as necessary     Normocytic anemia due to blood loss   Assessment & Plan    -patient with GI bleed, see plan for \"GI Bleed\"     Alcohol abuse   Assessment & Plan    -Alcohol cessation resources and information offered  -Follow-up outpatient     Withdrawal symptoms, alcohol (HCC)   Assessment & Plan    - no hx of alcohol withdrawal seizures or delirium tremens, 3 years sober but relapse for the past 2 weeks, 3-4 1/2 glasses of hard liquor per day    -CIWA protocol, will consider de-escalating based on scoring  -Admit to telemetry  -Multivitamins IV, p.o. multivitamins/B12/folate           "

## 2019-04-13 NOTE — ANESTHESIA POSTPROCEDURE EVALUATION
Patient: Noam Choi    Procedure Summary     Date:  04/13/19 Room / Location:  [facility] 11 / SURGERY [facility]    Anesthesia Start:  1021 Anesthesia Stop:      Procedure:  GASTROSCOPY with Banding (N/A Esophagus) Diagnosis:  (gastropathy, gastritis)    Surgeon:  Jose Andrade D.O. Responsible Provider:  Eliel Bush M.D.    Anesthesia Type:  MAC ASA Status:  2          Final Anesthesia Type: MAC  Last vitals  BP   Blood Pressure: 127/75, NIBP: 137/72    Temp   36.3 °C (97.4 °F)    Pulse   Pulse: 98, Heart Rate (Monitored): (!) 106   Resp   18    SpO2   95 %      Anesthesia Post Evaluation    Patient location during evaluation: PACU  Patient participation: complete - patient participated  Level of consciousness: awake and alert  Pain score: 0    Airway patency: patent  Anesthetic complications: no  Cardiovascular status: hemodynamically stable  Respiratory status: acceptable  Hydration status: euvolemic    PONV: none           Nurse Pain Score: 0 (NPRS)

## 2019-04-13 NOTE — PROGRESS NOTES
· 2 RN skin check complete with HEENA Deleon.  · Devices in place SCD's.  · Skin assessed under devices intact.  · Confirmed pressure ulcers found: None.  · New potential pressure ulcers noted: NA.        Bilateral heels dry. Skin otherwise intact.

## 2019-04-13 NOTE — ED NOTES
x2 failed IV attempts by this RN. x1 failed attempt for additional access by AEMT. Waiting another RN available to find additional access to start drips

## 2019-04-13 NOTE — DISCHARGE PLANNING
"Care Transition Team Assessment    Patient is currently living in [place]. He informs me that his brother will take him home upon discharge. Patient reports he had stopped drinking  But relapsed 3-4 days ago when he consumed enough hard liquor to get drunk. States \"that is why I am here.\"   Patient does not eat meat and tells me NO HAIR IS TO BE CUT ANYWHERE ON HIS BODY.    Information Source  Orientation : Oriented x 4  Information Given By: Patient  Who is responsible for making decisions for patient? : Patient    Readmission Evaluation  Is this a readmission?: No    Elopement Risk  Legal Hold: No  Ambulatory or Self Mobile in Wheelchair: No-Not an Elopement Risk    Interdisciplinary Discharge Planning  Does Admitting Nurse Feel This Could be a Complex Discharge?: No  Primary Care Physician: Dr. Yanez in [place]  Lives with - Patient's Self Care Capacity: Other (Comments) (sibling)  Patient or legal guardian wants to designate a caregiver (see row info): No  Support Systems: Family Member(s)  Housing / Facility: [address]  Do You Take your Prescribed Medications Regularly: No  Reasons Why Not Taking Medications :  (no medications)  Able to Return to Previous ADL's: Yes  Mobility Issues: No  Prior Services: None  Patient Expects to be Discharged to:: Home  Assistance Needed: No  Durable Medical Equipment: Not Applicable    Discharge Preparedness  What is your plan after discharge?: Other (comment) (home independent)  What are your discharge supports?: Sibling  Prior Functional Level: Drives Self, Independent with Activities of Daily Living  Difficulity with ADLs: None    Functional Assesment  Prior Functional Level: Drives Self, Independent with Activities of Daily Living    Finances  Financial Barriers to Discharge: No  Prescription Coverage: Yes    Vision / Hearing Impairment  Vision Impairment : No  Hearing Impairment : No    Values / Beliefs / Concerns  Values / Beliefs Concerns : Yes  Cultural Requests During " Hospitalization: No hair to be cut  Spiritual Requests During Hospitalization: Yes (does not eat meat)    Advance Directive  Advance Directive?: None  Advance Directive offered?: AD Booklet refused    Domestic Abuse  Have you ever been the victim of abuse or violence?: No    Psychological Assessment  History of Substance Abuse: Alcohol  Date Last Used - Alcohol: 3-4 days ago    Discharge Risks or Barriers  Discharge risks or barriers?: Substance abuse  Patient risk factors: Substance abuse    Anticipated Discharge Information  Anticipated discharge disposition: Home  Discharge Address: [address] [place] [address], [place]  Discharge Contact Phone Number: [phone]

## 2019-04-13 NOTE — ASSESSMENT & PLAN NOTE
-likely related to blood loss, possibly multifactorial with hx of alcoholism; severe  -received 1ut of platelets. plt count stable after tranfusion. No further s/s of bleed.

## 2019-04-13 NOTE — CARE PLAN
Problem: Communication  Goal: The ability to communicate needs accurately and effectively will improve    Intervention: Educate patient and significant other/support system about the plan of care, procedures, treatments, medications and allow for questions  White board updated with POC and care team information upon arrival to unit      Problem: Safety  Goal: Will remain free from falls  Pt educated regarding risk for falls related to equipment and lighting. Verbalized agreement to call for assistance.

## 2019-04-13 NOTE — PROCEDURES
DATE OF SERVICE:  04/13/2019    PROCEDURE PERFORMED:  Esophagogastroduodenoscopy.    PREOPERATIVE DIAGNOSES:  Cirrhosis, gastrointestinal bleed.    POSTOPERATIVE DIAGNOSES:  Portal hypertensive gastropathy and esophageal   varices.    ANESTHESIA:  Per anesthesiologist in the operating room.    DESCRIPTION OF PROCEDURE:  After informed consent and appropriate sedation,   the patient was placed in the left lateral position and the gastroscope was   advanced through the oropharynx into the esophagus through to the second   portion of the duodenum.  The scope was then slowly withdrawn as the mucosa   was examined carefully.  The duodenum was unremarkable; however, there was   portal hypertensive gastropathy seen of moderate severity in the gastric body,   cardia, and fundus.  Gastric antrum was spared and was normal.  There were no   gastric varices seen.  His stomach was decompressed.  The scope was withdrawn   back into the esophagus.  There were 3 chains of esophageal varices that were   moderately compressible with insufflation, but not completely.  The scope was   withdrawn and a  device was placed over the scope and the scope was   reintroduced into the esophagus and brought to the GE junction.  Approximately   2 cm above the GE junction, a band was fired successfully, 2 cm above that a   separate line of varices was banded successfully both with good hemostasis.    After 2 bands, the remainder of varices completely flattened out proximally.    The scope was withdrawn.  There were no immediate postoperative complications.    RECOMMENDATIONS:  1.  IV PPI drip for 72 hours total from admission.  2.  Repeat EGD in 6-8 weeks as an outpatient for possible repeat banding.  3.  Continue IV PPI and octreotide for now.  4.  Clear liquid diet okay.  If the patient shows no signs of repeat bleeding   by tomorrow, patient can advance to a soft diet at that time.  5.  Counseled cessation of alcohol use.  6.  Patient will  need close followup with gastroenterologist as an outpatient   to calculate MELD sodium every 3 months with CBC, CMP, and PT/INR as well as   ultrasounds screening for hepatocellular carcinoma every 6 months and AFP.    7.  Please call with any questions or concerns.       ____________________________________     DO NAYELI Berger / SANDRO    DD:  04/13/2019 10:41:23  DT:  04/13/2019 11:06:11    D#:  [id number]  Job#:  [id number]

## 2019-04-13 NOTE — SENIOR ADMIT NOTE
Date of admission: 4/12/2019  Reason for admission: Upper GI bleed    HPI: 43-year-old male with a past medical history of alcohol use disorder on Antabuse and alcoholic cirrhosis with thrombocytopenia and ascites presented to the ER for 3-4 days of bloody vomiting.  He reports a total of approximately 16 episodes of hematemesis, started with mahendra blood, reports approximately 14 episodes of hematemesis (large volume), last 3 episodes have been clear, denies any abdominal pain, pyrosis, long-standing GERD.  Reports 1 melanotic bowel movement.  Also reports dizziness and orthostasis symptoms.  Hemoglobin in ely was 13 which trended down to 11.  He took 1 pill of Antabuse while on alcohol which aggravated his nausea and vomiting.  He has been sober for over 7 years, recently relapsed, and has been drinking heavily over the past few days. DHA has been consulted.  Recommend n.p.o. status, IV fluid resuscitation, octreotide drip, and PPI drip.  Patient reports prior history of hematemesis over 7 years ago but is unable to tell if he has a history of variceal bleed versus peptic ulcer disease.  Denies any surveillance EGDs.    Patient has no known allergies.  Past Medical History:   Diagnosis Date   • Anemia    • ETOH abuse    • GI bleed    • Liver disease    • Pancreatitis      Past Surgical History:   Procedure Laterality Date   • HERNIA REPAIR     • OTHER      right leg fx repair     Social History   Substance Use Topics   • Smoking status: Not on file   • Smokeless tobacco: Never Used   • Alcohol use No      Comment: former       GEN: Well developed, AO x 3, in no apparent distress   HEENT: Atraumatic, normocephalic, dried blood around mouth, dry oral mucosa, no JVD, no cervical LAD  CVS: S1 S2 present, tachycardic, no M/R/G  RS: Air entry equal bilaterally. No W/R/R noted upon auscultation  ABD: Soft, nontender, BS present in all quadrants.  Flank dullness.  EXT: No pedal edema noted  NEUR: Patient is able to move  all of her extremities. CN 2-12 are grossly intact     Assessment and plan    //Upper GI bleed-large volume, resolved  //Alcohol cirrhosis  //Alcohol abuse  //Thrombocytopenia    -Has been bleeding for the past 3 days, however over the past 4-6 hours vomitus has cleared up.  -Admit to telemetry, cardiac monitoring, every 6 hr H&H, transfuse <7, 2 large-bore PIV  -2 units of platelet on hold for further bleeding vs abnormal TEG  -s/p rally bag. MV, B1, FA. CIWA protocol  -NS infusion  -antiemetics for nausea  -Protonix and octreotide GGT  -fall, seizure, and aspiration precautions  -Alcohol abuse counseling prior to DC      Full Code

## 2019-04-13 NOTE — ANESTHESIA QCDR
2019 [facility] Clinical Data Registry (for Quality Improvement)     Postoperative nausea/vomiting risk protocol (Adult = 18 yrs and Pediatric 3-17 yrs)- (430 and 463)  General inhalation anesthetic (NOT TIVA) with PONV risk factors: No  Provision of anti-emetic therapy with at least 2 different classes of agents: N/A  Patient DID NOT receive anti-emetic therapy and reason is documented in Medical Record: N/A    Multimodal Pain Management- (AQI59)  Patient undergoing Elective Surgery (i.e. Outpatient, or ASC, or Prescheduled Surgery prior to Hospital Admission): No  Use of Multimodal Pain Management, two or more drugs and/or interventions, NOT including systemic opioids: N/A  Exception: Documented allergy to multiple classes of analgesics: N/A    PACU assessment of acute postoperative pain prior to Anesthesia Care End- Applies to Patients Age = 18- (ABG7)  Initial PACU pain score is which of the following: < 7/10  Patient unable to report pain score: N/A    Post-anesthetic transfer of care checklist/protocol to PACU/ICU- (426 and 427)  Upon conclusion of case, patient transferred to which of the following locations: PACU/Non-ICU  Use of transfer checklist/protocol: Yes  Exclusion: Service Performed in Patient Hospital Room (and thus did not require transfer): N/A    PACU Reintubation- (AQI31)  General anesthesia requiring endotracheal intubation (ETT) along with subsequent extubation in OR or PACU: No  Required reintubation in the PACU: N/A  Extubation was a planned trial documented in the medical record prior to removal of the original airway device: N/A    Unplanned admission to ICU related to anesthesia service up through end of PACU care- (MD51)  Unplanned admission to ICU (not initially anticipated at anesthesia start time): No

## 2019-04-13 NOTE — ANESTHESIA PREPROCEDURE EVALUATION
Relevant Problems   No relevant active problems       Physical Exam    Airway   Mallampati: II  TM distance: >3 FB  Neck ROM: full       Cardiovascular - normal exam  Rhythm: regular  Rate: normal  (-) murmur     Dental - normal exam         Pulmonary - normal exam  Breath sounds clear to auscultation     Abdominal    Neurological - normal exam                 Anesthesia Plan    ASA 2       Plan - MAC             Induction: intravenous    Postoperative Plan: Postoperative administration of opioids is intended.    Pertinent diagnostic labs and testing reviewed    Informed Consent:    Anesthetic plan and risks discussed with patient.    Use of blood products discussed with: patient whom consented to blood products.

## 2019-04-13 NOTE — CONSULTS
DATE OF SERVICE:  04/13/2019    REASON FOR CONSULTATION:  GI bleed.    HISTORY OF PRESENT ILLNESS:  Patient is a pleasant 43-year-old alcoholic male   who has a history of esophageal varices and was transferred here for EGD from   an outside hospital.  Patient is currently on octreotide and IV PPI at this   time.  Patient reports that he was told he had cirrhosis several years ago and   reports that 2 days ago he had an episode of coffee-ground emesis as well as   bright red hematemesis and patient has had melena since then.  Patient denies   any nausea, vomiting since that episode.  Patient denies any fever, chills,   chest pain, shortness of breath.  Patient denies any abdominal pain.  Patient   reports he was sober for the last 3 years except 1 week ago he had 2 days   where he drank approximately 3-4 shots.  Patient denies any recent NSAID use.    Patient reports his last EGD was several years ago.    PAST MEDICAL HISTORY:  Cirrhosis, esophageal varices.    PAST SURGICAL HISTORY:  Hernia repair.    ALLERGIES:  No known drug allergies.    FAMILY HISTORY:  Noncontributory.    SOCIAL HISTORY:  Patient does have a history of heavy alcohol use; however,   patient reports not drinking anything significant other than 1-2 days in the   past 3 years.  Patient denies any illicit drug use.    MEDICATIONS:  See med rec list.    REVIEW OF SYSTEMS:  A 14-point review of systems was obtained and found to be   negative except for those above in the HPI.    PHYSICAL EXAMINATION:  GENERAL:  No acute distress, alert, awake, oriented x3.  VITAL SIGNS:  Stable.  HEENT:  PERRLA.  Extraocular movements intact.  Sclerae are anicteric.  HEART:  Regular rate and rhythm.  S1, S2.  No murmur auscultated.  LUNGS:  Clear to auscultation bilaterally.  ABDOMEN:  Soft, nontender.  No guarding or rebound.  MUSCULOSKELETAL:  No edema noted, bilateral lower extremities.  NEUROLOGIC:  Grossly intact.  PSYCHIATRIC:  Affect is normal.  SKIN:  No  jaundice or significant pallor.    LABORATORY DATA:  White count 5.6, hemoglobin 8.5, hematocrit 24.6, platelets   55.  BUN 17, creatinine 0.56.  AST 46, ALT 22, alkaline phosphatase 75, total   bilirubin 2.6, albumin 3.2.  INR 1.8.    ASSESSMENT AND PLAN:  1.  Gastrointestinal bleed, likely upper gastrointestinal source.  I suspect   patient had an esophageal variceal bleed.  We will plan to do EGD and make   further recommendations based on that.  I suspect we will be doing an   esophageal variceal banding; however, it will only depend on what we find on   the exam.  For now, I will keep patient n.p.o. other than sips with meds and   antiemetics p.r.n.  Patient will need to be continued on IV PPI and IV   octreotide.  Please see endoscopy note for further details.  2.  Cirrhosis.  See above.  Patient has thrombocytopenia and coagulopathy.    Counseled complete cessation of alcohol.  3.  History of alcohol abuse, see above.  4.  Thrombocytopenia, no bleeding since in the hospital; however, patient will   undergo EGD to evaluate further.  5.  Coagulopathy, see above, no active bleeding at this time.  We will hold   off on reversing for now.  6.  We will make further recommendations based on results of EGD.  Please keep   patient n.p.o. in anticipation of procedure.  Continue with octreotide and   Protonix drips.  Counseled complete cessation of alcohol at length with   patient.  Patient will need close followup as an outpatient and will need labs   every 3 months to recalculate a MELD sodium score.  Patient is not currently   a candidate for liver transplant as he is actively drinking as of 2 weeks ago.       ____________________________________     DO NAYELI Berger / SANDRO    DD:  04/13/2019 08:51:40  DT:  04/13/2019 11:18:58    D#:  [id number]  Job#:  [id number]

## 2019-04-13 NOTE — ANESTHESIA TIME REPORT
Anesthesia Start and Stop Event Times     Date Time Event    4/13/2019 1021 Anesthesia Start        Responsible Staff  04/13/19    Name Role Begin End    Eliel Bush M.D. Anesth 1021         Preop Diagnosis (Free Text):  Pre-op Diagnosis     blood in vomit        Preop Diagnosis (Codes):  Diagnosis Information     Diagnosis Code(s):         Post op Diagnosis  GI bleed      Premium Reason  E. Weekend    Comments:

## 2019-04-13 NOTE — H&P
Internal Medicine Admitting History and Physical    Note Author: Fermin Chandler M.D.       Name Noam Choi       1975   Age/Sex 43 y.o. male   MRN [id number]   Code Status Full Code     After 5PM or if no immediate response to page, please call for cross-coverage  Attending/Team: rED See Patient List for primary contact information  Call [phone] to page    1st Call - Day Intern (R1):   Junaid 2nd Call - Day Sr. Resident (R2/R3):   Cesar       Chief Complaint:  Vomiting and coughing up blood    HPI:  43-year-old male past medical history chronic anemia, alcohol abuse (in remission times 3 years, recently relapsed), liver disease, pancreatitis presents the emergency department with 14 episodes of nausea and vomiting.  The first handful of episodes, the patient vomited clotted blood, after which the vomiting cleared and was bilious.  He states that a friend came into town 1-2 weeks ago and was encouraging him to drink alcohol.  He was drinking 3-4 drinks per day of 1/2 glass [place].  In the emergency department, H&H 11.1/31.7.  Patient has low platelets at 64.  TEG pending.  Patient's AST is 46, BUN is 17, total bilirubin is 2.8.  He has mildly tachycardic and does not appear to be in distress.  He does not complain of pain.    Octreotide and IV pantoprazole started in emergency department.    The patient is concerned that his primary care doctor had given him disulfiram and this may have caused the bleed.  We offered information and counseling, and we do not suspect this is from disulfiram.  The patient is highly motivated to stop drinking.  He regrets relapsing.    He does not report a history of withdrawals.  His last drink was earlier today.    Review of Systems:   Constitutional: Negative for chills and fever.   Eyes: Negative for blurred vision.   Respiratory: Negative for cough and shortness of breath.    Cardiovascular: Negative for chest pain, palpitations and leg swelling.    Gastrointestinal: Negative for abdominal pain, constipation, diarrhea.  POSITIVE for NAUSEA AND VOMITING and HEMATEMESIS.   Genitourinary: Negative for dysuria, frequency and urgency.   Musculoskeletal: Negative.    Neurological: Negative for dizziness, focal weakness and headaches.   Integumentary: Negative for rash.  Psychiatric/Behavioral: Negative.    Past Medical History:   Past Medical History:   Diagnosis Date   • Anemia    • ETOH abuse    • GI bleed    • Liver disease    • Pancreatitis        Past Surgical History:  Past Surgical History:   Procedure Laterality Date   • HERNIA REPAIR     • OTHER      right leg fx repair       Current Outpatient Medications:  Home Medications     Reviewed by Salma Jaquez (Pharmacy Tech) on 04/12/19 at 2214  Med List Status: Complete   Medication Last Dose Status        Patient Emile Taking any Medications                       Medication Allergy/Sensitivities:  No Known Allergies    Family History:  History reviewed. No pertinent family history.    Social History:  Social History     Social History   • Marital status:      Spouse name: N/A   • Number of children: N/A   • Years of education: N/A     Social History Main Topics   • Smoking status: Not on file   • Smokeless tobacco: Never Used   • Alcohol use No      Comment: former   • Drug use: No   • Sexual activity: Not on file     Other Topics Concern   • Not on file     Social History Narrative   • No narrative on file       PCP : No primary care provider on file.      Physical Exam     Vitals:    04/12/19 2130 04/12/19 2200 04/12/19 2230 04/12/19 2300   BP:   130/86 139/84   Pulse: (!) 111 (!) 102 (!) 107 (!) 103   Resp: 20 20 16 18   Temp: 37.7 °C (99.9 °F)   37.3 °C (99.1 °F)   TempSrc: Temporal   Temporal   SpO2: 95% 100% 99% 98%   Weight:    97.1 kg (214 lb 1.1 oz)   Height:         Body mass index is 30.72 kg/m².  /84   Pulse (!) 103   Temp 37.3 °C (99.1 °F) (Temporal)   Resp 18   Ht 1.778  "m (5' 10\")   Wt 97.1 kg (214 lb 1.1 oz)   SpO2 98%   BMI 30.72 kg/m²   O2 therapy: Pulse Oximetry: 98 %, O2 (LPM): 0, O2 Delivery: None (Room Air)       Constitutional: Oriented to person, place, and time and well-developed, well-nourished, and in no distress.   HENT:   Head: Normocephalic and atraumatic.   Eyes: Pupils are equal, round, and reactive to light. EOM are normal.   Neck: Normal range of motion. Neck supple. No JVD present. No tracheal deviation present. No thyromegaly present.   Cardiovascular: Regular rhythm.  Exam reveals no gallop and no friction rub.    No murmur heard.  Pulmonary/Chest: Effort normal and breath sounds normal. No stridor. No respiratory distress. No wheezes. No rales. No tenderness.   Abdominal: Soft. Bowel sounds are normal. No distension and no mass. There is no tenderness. There is no rebound and no guarding.   Musculoskeletal: Normal range of motion. No edema, tenderness or deformity.   Lymphadenopathy:     No cervical adenopathy.   Neurological: Alert and oriented to person, place, and time. No cranial nerve deficit.   Skin: Skin is warm and dry. No rash noted. No erythema.   Psychiatric: Mood, memory, affect and judgment normal.   Vitals reviewed.          Data Review       Old Records Request:   Deferred  Current Records review/summary: Completed    Lab Data Review:  Recent Results (from the past 24 hour(s))   EKG    Collection Time: 19  8:05 PM   Result Value Ref Range    Report       [facility] Emergency Dept.    Test Date:  2019  Pt Name:    JOHNNY FERREIRA              Department: ER  MRN:        [id number]                      Room:       RD 08  Gender:     Male                         Technician: [id number]  :        1975                   Requested By:ER TRIAGE PROTOCOL  Order #:    [id number]                    Edith MD: David Christensen MD    Measurements  Intervals                                Axis  Rate:       125                "           P:          70  MT:         121                          QRS:        43  QRSD:       85                           T:          34  QT:         322  QTc:        465    Interpretive Statements  Sinus tachycardia normal axis normal intervals no ST changes consistent with  acute regional ischemia  Electronically Signed On 4- 21:14:00 PDT by David Christensen MD     CBC WITH DIFFERENTIAL    Collection Time: 04/12/19  8:15 PM   Result Value Ref Range    WBC 9.6 4.8 - 10.8 K/uL    RBC 3.79 (L) 4.70 - 6.10 M/uL    Hemoglobin 11.1 (L) 14.0 - 18.0 g/dL    Hematocrit 31.7 (L) 42.0 - 52.0 %    MCV 83.6 81.4 - 97.8 fL    MCH 29.3 27.0 - 33.0 pg    MCHC 35.0 33.7 - 35.3 g/dL    RDW 44.7 35.9 - 50.0 fL    Platelet Count 64 (L) 164 - 446 K/uL    MPV 10.7 9.0 - 12.9 fL    Neutrophils-Polys 89.80 (H) 44.00 - 72.00 %    Lymphocytes 6.20 (L) 22.00 - 41.00 %    Monocytes 3.60 0.00 - 13.40 %    Eosinophils 0.10 0.00 - 6.90 %    Basophils 0.10 0.00 - 1.80 %    Immature Granulocytes 0.20 0.00 - 0.90 %    Nucleated RBC 0.00 /100 WBC    Neutrophils (Absolute) 8.61 (H) 1.82 - 7.42 K/uL    Lymphs (Absolute) 0.59 (L) 1.00 - 4.80 K/uL    Monos (Absolute) 0.35 0.00 - 0.85 K/uL    Eos (Absolute) 0.01 0.00 - 0.51 K/uL    Baso (Absolute) 0.01 0.00 - 0.12 K/uL    Immature Granulocytes (abs) 0.02 0.00 - 0.11 K/uL    NRBC (Absolute) 0.00 K/uL   COMP METABOLIC PANEL    Collection Time: 04/12/19  8:15 PM   Result Value Ref Range    Sodium 136 135 - 145 mmol/L    Potassium 4.2 3.6 - 5.5 mmol/L    Chloride 103 96 - 112 mmol/L    Co2 22 20 - 33 mmol/L    Anion Gap 11.0 0.0 - 11.9    Glucose 151 (H) 65 - 99 mg/dL    Bun 17 8 - 22 mg/dL    Creatinine 0.57 0.50 - 1.40 mg/dL    Calcium 7.8 (L) 8.5 - 10.5 mg/dL    AST(SGOT) 46 (H) 12 - 45 U/L    ALT(SGPT) 22 2 - 50 U/L    Alkaline Phosphatase 75 30 - 99 U/L    Total Bilirubin 2.6 (H) 0.1 - 1.5 mg/dL    Albumin 3.2 3.2 - 4.9 g/dL    Total Protein 6.6 6.0 - 8.2 g/dL    Globulin 3.4 1.9 - 3.5 g/dL     A-G Ratio 0.9 g/dL   LIPASE    Collection Time: 04/12/19  8:15 PM   Result Value Ref Range    Lipase 24 11 - 82 U/L   ESTIMATED GFR    Collection Time: 04/12/19  8:15 PM   Result Value Ref Range    GFR If African American >60 >60 mL/min/1.73 m 2    GFR If Non African American >60 >60 mL/min/1.73 m 2       Imaging/Procedures Review:    Independant Imaging Review: Completed  No orders to display          EKG:   EKG Independant Review: Completed    Records reviewed and summarized in current documentation :  Yes  [employer] teaching service handout given to patient:  No             Assessment/Plan     #Upper GI bleed: Patient presents with multiple episodes of vomiting, initially clotted dark blood becoming clearing bilious.  Hemoglobin hematocrit are stable.  Patient reports that his last instance of vomiting was 4 hours ago.  Platelets are low.  Patient is tachycardic, but is normotensive.  Recent history of alcohol relapse.  Alcoholic gastritis versus gastric varices, patient does have a history of liver disease.      TEG  Admit  IV fluids  IV pantoprazole and octreotide in emergency department  Continue IV PPI  Platelets on hold  Gastroenterology consult in a.m.    #Alcohol withdrawal: Patient does not report a history of alcohol withdrawal seizures or delirium tremens.  He was 3 years sober and has been drinking for the past 2 weeks, 3-4 1/2 glasses of hard liquor per day.    [facility] protocol  Admit to telemetry  Multivitamins IV, p.o. multivitamins/B12/folate tomorrow.    #Alcohol use disorder: Patient was in remission for the past 3 years, reports a long-standing history of alcoholism before this time.  A friend came and visited him and encouraged him to drink alcohol, and he relapsed 2 weeks ago.  He voiced the desire to quit, his primary care provider in [place] prescribed him disulfiram.    Alcohol cessation resources and information offered  Follow-up outpatient    Anticipated Hospital stay:  >2 midnights        Quality  Measures  Quality-Core Measures   Reviewed items::  EKG reviewed, Labs reviewed and Medications reviewed  Humphrey catheter::  No Humphrey  DVT prophylaxis pharmacological::  Contraindicated - High bleeding risk  DVT prophylaxis - mechanical:  SCDs    PCP: No primary care provider on file.

## 2019-04-14 ENCOUNTER — PATIENT OUTREACH (OUTPATIENT)
Dept: HEALTH INFORMATION MANAGEMENT | Facility: OTHER | Age: 44
End: 2019-04-14

## 2019-04-14 VITALS
DIASTOLIC BLOOD PRESSURE: 85 MMHG | BODY MASS INDEX: 30.99 KG/M2 | HEIGHT: 70 IN | HEART RATE: 97 BPM | WEIGHT: 216.49 LBS | SYSTOLIC BLOOD PRESSURE: 125 MMHG | RESPIRATION RATE: 18 BRPM | TEMPERATURE: 99.2 F | OXYGEN SATURATION: 95 %

## 2019-04-14 PROBLEM — K70.30 ALCOHOLIC CIRRHOSIS OF LIVER WITHOUT ASCITES (HCC): Status: ACTIVE | Noted: 2019-04-14

## 2019-04-14 LAB
ALBUMIN SERPL BCP-MCNC: 2.9 G/DL (ref 3.2–4.9)
ALBUMIN/GLOB SERPL: 1.1 G/DL
ALP SERPL-CCNC: 63 U/L (ref 30–99)
ALT SERPL-CCNC: 27 U/L (ref 2–50)
ANION GAP SERPL CALC-SCNC: 6 MMOL/L (ref 0–11.9)
AST SERPL-CCNC: 64 U/L (ref 12–45)
BILIRUB SERPL-MCNC: 1.2 MG/DL (ref 0.1–1.5)
BUN SERPL-MCNC: 11 MG/DL (ref 8–22)
CALCIUM SERPL-MCNC: 7 MG/DL (ref 8.5–10.5)
CHLORIDE SERPL-SCNC: 111 MMOL/L (ref 96–112)
CO2 SERPL-SCNC: 22 MMOL/L (ref 20–33)
CREAT SERPL-MCNC: 0.65 MG/DL (ref 0.5–1.4)
ERYTHROCYTE [DISTWIDTH] IN BLOOD BY AUTOMATED COUNT: 49.1 FL (ref 35.9–50)
ERYTHROCYTE [DISTWIDTH] IN BLOOD BY AUTOMATED COUNT: 49.5 FL (ref 35.9–50)
GLOBULIN SER CALC-MCNC: 2.7 G/DL (ref 1.9–3.5)
GLUCOSE SERPL-MCNC: 92 MG/DL (ref 65–99)
HCT VFR BLD AUTO: 25.3 % (ref 42–52)
HCT VFR BLD AUTO: 25.4 % (ref 42–52)
HGB BLD-MCNC: 8.5 G/DL (ref 14–18)
HGB BLD-MCNC: 8.5 G/DL (ref 14–18)
MAGNESIUM SERPL-MCNC: 2 MG/DL (ref 1.5–2.5)
MCH RBC QN AUTO: 29.1 PG (ref 27–33)
MCH RBC QN AUTO: 29.4 PG (ref 27–33)
MCHC RBC AUTO-ENTMCNC: 33.5 G/DL (ref 33.7–35.3)
MCHC RBC AUTO-ENTMCNC: 33.6 G/DL (ref 33.7–35.3)
MCV RBC AUTO: 87 FL (ref 81.4–97.8)
MCV RBC AUTO: 87.5 FL (ref 81.4–97.8)
MORPHOLOGY BLD-IMP: NORMAL
PHOSPHATE SERPL-MCNC: 2.3 MG/DL (ref 2.5–4.5)
PLATELET # BLD AUTO: 58 K/UL (ref 164–446)
PLATELET # BLD AUTO: 59 K/UL (ref 164–446)
PLATELET BLD QL SMEAR: NORMAL
PLATELETS.RETICULATED NFR BLD AUTO: 3.7 K/UL (ref 0.6–13.1)
PMV BLD AUTO: 10.6 FL (ref 9–12.9)
PMV BLD AUTO: 9.9 FL (ref 9–12.9)
POTASSIUM SERPL-SCNC: 3.8 MMOL/L (ref 3.6–5.5)
PROT SERPL-MCNC: 5.6 G/DL (ref 6–8.2)
RBC # BLD AUTO: 2.89 M/UL (ref 4.7–6.1)
RBC # BLD AUTO: 2.92 M/UL (ref 4.7–6.1)
SODIUM SERPL-SCNC: 139 MMOL/L (ref 135–145)
WBC # BLD AUTO: 4.2 K/UL (ref 4.8–10.8)
WBC # BLD AUTO: 4.6 K/UL (ref 4.8–10.8)

## 2019-04-14 PROCEDURE — 700105 HCHG RX REV CODE 258: Performed by: EMERGENCY MEDICINE

## 2019-04-14 PROCEDURE — 3E0234Z INTRODUCTION OF SERUM, TOXOID AND VACCINE INTO MUSCLE, PERCUTANEOUS APPROACH: ICD-10-PCS | Performed by: HOSPITALIST

## 2019-04-14 PROCEDURE — 700105 HCHG RX REV CODE 258: Performed by: STUDENT IN AN ORGANIZED HEALTH CARE EDUCATION/TRAINING PROGRAM

## 2019-04-14 PROCEDURE — 700111 HCHG RX REV CODE 636 W/ 250 OVERRIDE (IP): Performed by: STUDENT IN AN ORGANIZED HEALTH CARE EDUCATION/TRAINING PROGRAM

## 2019-04-14 PROCEDURE — C9113 INJ PANTOPRAZOLE SODIUM, VIA: HCPCS | Performed by: EMERGENCY MEDICINE

## 2019-04-14 PROCEDURE — 700102 HCHG RX REV CODE 250 W/ 637 OVERRIDE(OP): Performed by: STUDENT IN AN ORGANIZED HEALTH CARE EDUCATION/TRAINING PROGRAM

## 2019-04-14 PROCEDURE — A9270 NON-COVERED ITEM OR SERVICE: HCPCS | Performed by: STUDENT IN AN ORGANIZED HEALTH CARE EDUCATION/TRAINING PROGRAM

## 2019-04-14 PROCEDURE — 700111 HCHG RX REV CODE 636 W/ 250 OVERRIDE (IP): Performed by: EMERGENCY MEDICINE

## 2019-04-14 PROCEDURE — 90686 IIV4 VACC NO PRSV 0.5 ML IM: CPT | Performed by: STUDENT IN AN ORGANIZED HEALTH CARE EDUCATION/TRAINING PROGRAM

## 2019-04-14 PROCEDURE — 36415 COLL VENOUS BLD VENIPUNCTURE: CPT

## 2019-04-14 PROCEDURE — 85027 COMPLETE CBC AUTOMATED: CPT

## 2019-04-14 PROCEDURE — 90471 IMMUNIZATION ADMIN: CPT

## 2019-04-14 PROCEDURE — 85055 RETICULATED PLATELET ASSAY: CPT

## 2019-04-14 PROCEDURE — 3E02340 INTRODUCTION OF INFLUENZA VACCINE INTO MUSCLE, PERCUTANEOUS APPROACH: ICD-10-PCS | Performed by: HOSPITALIST

## 2019-04-14 PROCEDURE — 99239 HOSP IP/OBS DSCHRG MGMT >30: CPT | Mod: GC | Performed by: HOSPITALIST

## 2019-04-14 RX ORDER — SULFAMETHOXAZOLE AND TRIMETHOPRIM 800; 160 MG/1; MG/1
1 TABLET ORAL 2 TIMES DAILY
Qty: 10 TAB | Refills: 0 | Status: SHIPPED | OUTPATIENT
Start: 2019-04-14 | End: 2019-04-14

## 2019-04-14 RX ORDER — PANTOPRAZOLE SODIUM 40 MG/1
40 TABLET, DELAYED RELEASE ORAL 2 TIMES DAILY
Qty: 112 TAB | Refills: 0 | Status: SHIPPED | OUTPATIENT
Start: 2019-04-14 | End: 2019-04-14

## 2019-04-14 RX ORDER — SULFAMETHOXAZOLE AND TRIMETHOPRIM 800; 160 MG/1; MG/1
1 TABLET ORAL 2 TIMES DAILY
Qty: 10 TAB | Refills: 0 | Status: SHIPPED | OUTPATIENT
Start: 2019-04-14 | End: 2019-04-19

## 2019-04-14 RX ORDER — FOLIC ACID 1 MG/1
1 TABLET ORAL DAILY
Qty: 30 TAB | Refills: 0 | Status: SHIPPED | OUTPATIENT
Start: 2019-04-15 | End: 2019-09-26

## 2019-04-14 RX ORDER — LANOLIN ALCOHOL/MO/W.PET/CERES
100 CREAM (GRAM) TOPICAL DAILY
Qty: 30 TAB | Refills: 0 | Status: SHIPPED | OUTPATIENT
Start: 2019-04-15 | End: 2019-09-26

## 2019-04-14 RX ORDER — PANTOPRAZOLE SODIUM 40 MG/1
40 TABLET, DELAYED RELEASE ORAL 2 TIMES DAILY
Qty: 112 TAB | Refills: 0 | Status: SHIPPED | OUTPATIENT
Start: 2019-04-14 | End: 2019-06-09

## 2019-04-14 RX ADMIN — DIBASIC SODIUM PHOSPHATE, MONOBASIC POTASSIUM PHOSPHATE AND MONOBASIC SODIUM PHOSPHATE 2 TABLET: 852; 155; 130 TABLET ORAL at 07:44

## 2019-04-14 RX ADMIN — THERA TABS 1 TABLET: TAB at 05:01

## 2019-04-14 RX ADMIN — CEFTRIAXONE SODIUM 2 G: 2 INJECTION, POWDER, FOR SOLUTION INTRAMUSCULAR; INTRAVENOUS at 14:03

## 2019-04-14 RX ADMIN — FOLIC ACID 1 MG: 1 TABLET ORAL at 05:01

## 2019-04-14 RX ADMIN — Medication 100 MG: at 05:00

## 2019-04-14 RX ADMIN — DIBASIC SODIUM PHOSPHATE, MONOBASIC POTASSIUM PHOSPHATE AND MONOBASIC SODIUM PHOSPHATE 2 TABLET: 852; 155; 130 TABLET ORAL at 12:15

## 2019-04-14 RX ADMIN — SODIUM CHLORIDE 8 MG/HR: 9 INJECTION, SOLUTION INTRAVENOUS at 02:15

## 2019-04-14 RX ADMIN — INFLUENZA A VIRUS A/MICHIGAN/45/2015 X-275 (H1N1) ANTIGEN (FORMALDEHYDE INACTIVATED), INFLUENZA A VIRUS A/SINGAPORE/INFIMH-16-0019/2016 IVR-186 (H3N2) ANTIGEN (FORMALDEHYDE INACTIVATED), INFLUENZA B VIRUS B/PHUKET/3073/2013 ANTIGEN (FORMALDEHYDE INACTIVATED), AND INFLUENZA B VIRUS B/MARYLAND/15/2016 BX-69A ANTIGEN (FORMALDEHYDE INACTIVATED) 0.5 ML: 15; 15; 15; 15 INJECTION, SUSPENSION INTRAMUSCULAR at 14:03

## 2019-04-14 ASSESSMENT — ENCOUNTER SYMPTOMS
COUGH: 0
SHORTNESS OF BREATH: 0
ROS GI COMMENTS: HEMATEMESIS
CONSTIPATION: 0
DIARRHEA: 0
NERVOUS/ANXIOUS: 0
DEPRESSION: 0
BLOOD IN STOOL: 0
NAUSEA: 1
FEVER: 0
MYALGIAS: 0
VOMITING: 1
HEADACHES: 0
DIZZINESS: 0
BRUISES/BLEEDS EASILY: 0
DOUBLE VISION: 0
BLURRED VISION: 0
PALPITATIONS: 0
CHILLS: 0
POLYDIPSIA: 0

## 2019-04-14 NOTE — PROGRESS NOTES
Internal Medicine Interval Note  Note Author: Vicente Quiroga M.D.     Name Noam Choi       1975   Age/Sex 43 y.o. male   MRN [id number]   Code Status Full code     After 5PM or if no immediate response to page, please call for cross-coverage  Attending/Team: Satnam/Richardson See Patient List for primary contact information  Call [phone] to page    1st Call - Day Intern (R1):   Junaid 2nd Call - Day Sr. Resident (R2/R3):   Cesar         Reason for interval visit  (Principal Problem)   GI Bleed, Cirrohsis      Interval Problem Daily Status Update  (24 hours, problem oriented, brief subjective history, new lab/imaging data pertinent to that problem)   -ANNMARIE o/n, patient without hematemesis/melena, has no current concerns    -H/H stable, stopping q6h trending  -tolerated clear liquid diet, advancing  -continue IV PPI for 72hr from admit, IV octreotide      Review of Systems   Constitutional: Negative for chills and fever.   HENT: Negative for ear pain and hearing loss.    Eyes: Negative for blurred vision and double vision.   Respiratory: Negative for cough and shortness of breath.    Cardiovascular: Negative for chest pain and palpitations.   Gastrointestinal: Positive for nausea and vomiting. Negative for blood in stool, constipation, diarrhea and melena.        Hematemesis   Genitourinary: Negative for dysuria and hematuria.   Musculoskeletal: Negative for joint pain and myalgias.   Skin: Negative for itching and rash.   Neurological: Negative for dizziness and headaches.   Endo/Heme/Allergies: Negative for polydipsia. Does not bruise/bleed easily.   Psychiatric/Behavioral: Negative for depression. The patient is not nervous/anxious.        Disposition/Barriers to discharge:   Clinical improvement    Consultants/Specialty  Gastroenterology  PCP: No primary care provider on file.      Quality Measures  Quality-Core Measures   Reviewed items::  Labs reviewed and Medications reviewed  Humphrey catheter::  No  Kam  DVT prophylaxis - mechanical:  SCDs          Physical Exam       Vitals:    04/13/19 2030 04/14/19 0009 04/14/19 0458 04/14/19 0810   BP: 126/74 121/71 123/73 124/78   Pulse: 90 88 88 79   Resp: 18 18 18 18   Temp: 37.2 °C (98.9 °F) 37.1 °C (98.8 °F) 36.7 °C (98 °F) 37.4 °C (99.4 °F)   TempSrc: Temporal Temporal Temporal Temporal   SpO2: 98% 96% 99% 97%   Weight: 98.2 kg (216 lb 7.9 oz)      Height:         Body mass index is 31.06 kg/m². Weight: 98.2 kg (216 lb 7.9 oz)  Oxygen Therapy:  Pulse Oximetry: 97 %, O2 (LPM): 0, O2 Delivery: None (Room Air)    Physical Exam   Constitutional: He is oriented to person, place, and time and well-developed, well-nourished, and in no distress. No distress.   HENT:   Head: Normocephalic and atraumatic.   Mouth/Throat: Oropharynx is clear and moist. No oropharyngeal exudate.   Eyes: Pupils are equal, round, and reactive to light. Conjunctivae and EOM are normal. Right eye exhibits no discharge. Left eye exhibits no discharge. No scleral icterus.   Neck: Normal range of motion. Neck supple. No tracheal deviation present.   Cardiovascular: Normal rate, regular rhythm, normal heart sounds and intact distal pulses.  Exam reveals no gallop and no friction rub.    No murmur heard.  Pulmonary/Chest: Effort normal and breath sounds normal. No respiratory distress. He has no wheezes. He has no rales. He exhibits no tenderness.   Abdominal: Soft. Bowel sounds are normal. He exhibits no distension and no mass. There is no tenderness. There is no rebound and no guarding.   Musculoskeletal: Normal range of motion. He exhibits no edema, tenderness or deformity.   Neurological: He is alert and oriented to person, place, and time. He exhibits normal muscle tone. Coordination normal.   Skin: Skin is warm and dry. No rash noted. He is not diaphoretic. No erythema. No pallor.   Psychiatric: Mood and affect normal.             Assessment/Plan     * GI bleed   Assessment & Plan    -hematemesis,  "melena, decreasing Hg, thrombocytopenia, recent history of alcohol relapse  -GI following, recs included in plan  -EGD, found 3 chains of esophageal varices, performed banding x2 with varices flattening out    -IVF as necessary  -H/H monitor, transfuse for <7  -continue IV PPI for 72hr from admit, IV octreotide     Alcoholic cirrhosis of liver without ascites (HCC)   Assessment & Plan    -MELD NA 14, Maddrey 38 (consider glucocorticoid), child-yu 7 class B (indication for transplant evaluation, periop mort 30%)    -counseled on cessation  -per GI, outpt f/u with labs q3mo for MELD-Na (CBC, CMP, and PT/INR); US screening for hepatocellular carcinoma q6mo     Hypomagnesemia   Assessment & Plan    -monitor and replete     Thrombocytopenia (HCC)   Assessment & Plan    -likely related to blood loss, possibly multifactorial with hx of alcoholism; severe  -received 1ut of platelets    -monitor for bleed, transfuse as necessary     Normocytic anemia due to blood loss   Assessment & Plan    -patient with GI bleed, see plan for \"GI Bleed\"     Alcohol abuse   Assessment & Plan    -Alcohol cessation resources and information offered  -Follow-up outpatient     Withdrawal symptoms, alcohol (HCC)   Assessment & Plan    - no hx of alcohol withdrawal seizures or delirium tremens, 3 years sober but relapse for the past 2 weeks, 3-4 1/2 glasses of hard liquor per day    -CIWA protocol, will consider de-escalating based on scoring  -Admit to telemetry  -Multivitamins IV, p.o. multivitamins/B12/folate           "

## 2019-04-14 NOTE — PROGRESS NOTES
Gastroenterology Progress Note     Author: Jose Celestinyasmeencullen   Date & Time Created: 4/14/2019 10:07 AM    Chief Complaint:  GI bleed    Interval History:  Patient did well overnight, no further bleeding.  Patient denies fever chills, chest pain or shortness of breath.  Patient denies GI bleeding, nausea, vomiting or diarrhea.  Patient denies any abdominal pain.  Patient has no confusion.    Review of Systems:  ROS    Physical Exam:  Physical Exam   Constitutional: He is oriented to person, place, and time. He appears well-developed and well-nourished.   HENT:   Head: Normocephalic and atraumatic.   Eyes: Pupils are equal, round, and reactive to light. Conjunctivae are normal.   Neck: Normal range of motion.   Cardiovascular: Normal rate and regular rhythm.    Pulmonary/Chest: Effort normal and breath sounds normal.   Abdominal: Soft. Bowel sounds are normal. He exhibits no distension. There is no tenderness. There is no rebound and no guarding.   Neurological: He is alert and oriented to person, place, and time.   Skin: Skin is warm and dry.   Psychiatric: He has a normal mood and affect. His behavior is normal.       Labs:          Recent Labs      04/12/19 2015 04/12/19 2348 04/13/19   2330   SODIUM  136  138  139   POTASSIUM  4.2  4.1  3.8   CHLORIDE  103  107  111   CO2  22  21  22   BUN  17  17  11   CREATININE  0.57  0.56  0.65   MAGNESIUM   --   1.4*  2.0   PHOSPHORUS   --   2.5  2.3*   CALCIUM  7.8*  7.1*  7.0*     Recent Labs      04/12/19 2015 04/12/19   2348  04/13/19   2330   ALTSGPT  22   --   27   ASTSGOT  46*   --   64*   ALKPHOSPHAT  75   --   63   TBILIRUBIN  2.6*   --   1.2   LIPASE  24   --    --    GLUCOSE  151*  122*  92     Recent Labs      04/12/19 2348 04/13/19   1728  04/13/19   2330  04/14/19   0547   RBC  3.32*   < >  2.94*  2.92*  2.89*   HEMOGLOBIN  9.6*   < >  8.8*  8.5*  8.5*   HEMATOCRIT  27.9*   < >  25.6*  25.4*  25.3*   PLATELETCT  47*   < >  61*  59*  58*   PROTHROMBTM   21.0*   --    --    --    --    INR  1.80*   --    --    --    --     < > = values in this interval not displayed.     Recent Labs      19   1728  19   2330  19   0547   WBC  9.6   < >  5.3  4.6*  4.2*   NEUTSPOLYS  89.80*   --    --    --    --    LYMPHOCYTES  6.20*   --    --    --    --    MONOCYTES  3.60   --    --    --    --    EOSINOPHILS  0.10   --    --    --    --    BASOPHILS  0.10   --    --    --    --    ASTSGOT  46*   --    --   64*   --    ALTSGPT  22   --    --   27   --    ALKPHOSPHAT  75   --    --   63   --    TBILIRUBIN  2.6*   --    --   1.2   --     < > = values in this interval not displayed.     Hemodynamics:  Temp (24hrs), Av.3 °C (99.2 °F), Min:36.7 °C (98 °F), Max:37.7 °C (99.8 °F)  Temperature: 37.4 °C (99.4 °F)  Pulse  Av.3  Min: 79  Max: 133Heart Rate (Monitored): 92  Blood Pressure: 124/78, NIBP: 124/77     Respiratory:    Respiration: 18, Pulse Oximetry: 97 %           Fluids:    Intake/Output Summary (Last 24 hours) at 19 1007  Last data filed at 19 1043   Gross per 24 hour   Intake              128 ml   Output                0 ml   Net              128 ml     Weight: 98.2 kg (216 lb 7.9 oz)  GI/Nutrition:  Orders Placed This Encounter   Procedures   • Diet Order Low Fiber(GI Soft)     Standing Status:   Standing     Number of Occurrences:   1     Order Specific Question:   Diet:     Answer:   Low Fiber(GI Soft) [2]     Medical Decision Making, by Problem:  Active Hospital Problems    Diagnosis   • *GI bleed [K92.2]   • Alcoholic cirrhosis of liver without ascites (HCC) [K70.30]   • Withdrawal symptoms, alcohol (HCC) [F10.239]   • Alcohol abuse [F10.10]   • Normocytic anemia due to blood loss [D50.0]   • Thrombocytopenia (HCC) [D69.6]   • Hypomagnesemia [E83.42]       Plan:  1.  Cirrhosis -recommend labs every 3 months, to recalculate meld sodium score.  Not a transplant candidate at this time, kulwinder most recently  approximately 2 weeks ago.  Counseled cessation.  No signs of encephalopathy, ascites.  Recommend imaging every 6 months to screen for hepatocellular carcinoma, as well as an AFP lab test every 6 months.  Recommend follow-up with myself in the next 3 months at digestive [facility].  Also, patient needs repeat EGD with possible variceal ligation in 6 weeks.  2.  GI bleed -likely secondary to hemorrhagic duodenitis, the varices do not appear to have recently bled, although they were banded.  Recommend PPI twice daily times 8 weeks, and then go down to once daily.  Will make further recommendations at time of next EGD in 6 weeks.  Recommend 1 week of antibiotics for SBP prophylaxis.  3.  Alcohol abuse -patient reports he has been sober for approximately 3 years, although 2 weeks ago he drank several shots.  Patient does own a liquor store.  4.  Call with questions or concerns, we will sign off for now.  Regular diet okay, as long as soft.    Quality-Core Measures

## 2019-04-14 NOTE — DISCHARGE PLANNING
MD requested LSW verify medications do not need prior auth. No insurance on file. MD able to get copy of insurance card from pt. Per MD, medications e-scribed to [facility]. LSW contacted [facility]. No insurance on file, LSW provided information from card. Pharmacist contacted insurance and stated information did not match. LSW emailed [facility] copy of card and requested update.    Update: LSW received update from PFA, pt's  filed with insurance is incorrect. Pt is the only person who can change. LSW met with pt at bedside and provided number to insurance for pt to change . Pt confirmed it was changed. LSW contacted [facility] to have them run insurance again. Insurance still would not go through. Per pharmacist, pt will not be able to get medications through insurance until  changed. Pt would have to pay cash for medication if he wants to  today. LSW informed bedside RN and [facility] MD.     [facility] MD discussed with pt. Per [employer] MD, pt will cash pay for prescriptions so he can discharge today and will figure out insurance during the week. LSW printed [employer] coupons for pt.

## 2019-04-14 NOTE — DISCHARGE INSTRUCTIONS
Discharge Instructions    Discharged to home by taxi with self. Discharged via walking, hospital escort: Yes.  Special equipment needed: Not Applicable    Be sure to schedule a follow-up appointment with your primary care doctor or any specialists as instructed.     Discharge Plan:   Diet Plan: Discussed  Activity Level: Discussed  Confirmed Follow up Appointment: Patient to Call and Schedule Appointment  Confirmed Symptoms Management: Discussed  Medication Reconciliation Updated: Yes  Pneumococcal Vaccine Administered/Refused: Not given - Patient refused pneumococcal vaccine  Influenza Vaccine Indication: Not indicated: Previously immunized this influenza season and > 8 years of age  Influenza Vaccine Given - only chart on this line when given: Influenza Vaccine Given (See MAR)    I understand that a diet low in cholesterol, fat, and sodium is recommended for good health. Unless I have been given specific instructions below for another diet, I accept this instruction as my diet prescription.   Other diet: Regular hepatic    Special Instructions: None    · Is patient discharged on Warfarin / Coumadin?   No     Depression / Suicide Risk    As you are discharged from this [facility] facility, it is important to learn how to keep safe from harming yourself.    Recognize the warning signs:  · Abrupt changes in personality, positive or negative- including increase in energy   · Giving away possessions  · Change in eating patterns- significant weight changes-  positive or negative  · Change in sleeping patterns- unable to sleep or sleeping all the time   · Unwillingness or inability to communicate  · Depression  · Unusual sadness, discouragement and loneliness  · Talk of wanting to die  · Neglect of personal appearance   · Rebelliousness- reckless behavior  · Withdrawal from people/activities they love  · Confusion- inability to concentrate     If you or a loved one observes any of these behaviors or has concerns about  self-harm, here's what you can do:  · Talk about it- your feelings and reasons for harming yourself  · Remove any means that you might use to hurt yourself (examples: pills, rope, extension cords, firearm)  · Get professional help from the community (Mental Health, Substance Abuse, psychological counseling)  · Do not be alone:Call your Safe Contact- someone whom you trust who will be there for you.  · Call your local CRISIS HOTLINE 664-4476 or 407-040-7908  · Call your local Children's Mobile Crisis Response Team [place] (378) 865-9805 or www.[place]  · Call the toll free National Suicide Prevention Hotlines   · National Suicide Prevention Lifeline 219-410-XVZN (6565)  · National Hope Line Network 800-SUICIDE (304-1252)

## 2019-04-14 NOTE — DISCHARGE SUMMARY
Internal Medicine Discharge Summary  Note Author: Jillian Guerrero M.D.       Name Noam Choi 1975   Age/Sex 43 y.o. male   MRN [id number]         Admit Date:  2019       Discharge Date:   19  Service:   [facility] Internal Medicine Red Team  Attending Physician(s):   YVETTE DURAN MD     Senior Resident(s):   Jillian Guerrero MD  Lucas Resident(s):   Vicente Quiroga MD  PCP: No primary care provider on file.      Primary Diagnosis:   Upper gastrointestinal bleeding likely from esophageal varices    Secondary Diagnoses:                Principal Problem:    GI bleed POA: Unknown  Active Problems:    Alcoholic cirrhosis of liver without ascites (HCC) POA: Unknown    Withdrawal symptoms, alcohol (HCC) POA: Unknown    Alcohol abuse POA: Unknown    Normocytic anemia due to blood loss POA: Unknown    Thrombocytopenia (HCC) POA: Unknown    Hypomagnesemia POA: Unknown  Resolved Problems:    * No resolved hospital problems. *      Hospital Summary (Brief Narrative):       43-year-old male with past medical history of alcoholic cirrhosis with thrombocytopenia and ascites is transferred from [place] with 3-4 days of hematemesis with mahendra red blood.  He has been sober for 7 years, recently relapsed and has been drinking heavily over the past few days before admission.  His hemoglobin at presentation was 13.4, dropped to 11 in our facility at the ER.  He was admitted with pantoprazole drip, octreotide and ceftriaxone.  Digestive health, GI has been on board since admission.  GI did EGD which showed moderate portal gastropathy and 3 chains of esophageal varices which were banded.  His bleeding stopped without any further hematemesis or melena since EGD.  Hemoglobin remained stable above 8, patient tolerated diet, ambulated without any symptoms and is medically stable to be discharged home.  He is instructed to follow-up with a gastroenterologist in 6-8 weeks for repeat EGD and further management of cirrhosis.   "He will be discharged on pantoprazole 40 mg twice daily and prophylactic Bactrim to complete total antibiotics 7 days to prevent infection implication in the setting of GI bleed with cirrhosis.    Patient /Hospital Summary (Details -- Problem Oriented) :          * GI bleed   Assessment & Plan    -hematemesis, melena, decreasing Hg, thrombocytopenia, recent history of alcohol relapse  -GI following, recs included in plan  -EGD, found 3 chains of esophageal varices, performed banding x2 with varices flattening out    -H & H post EGD stable. GI cleared him for discharge on pantoprazole 40 bid x 8 weeks, follow up with outpatient GI in 6 weeks.  -continue oral bactrim for GI bleed in the setting of cirrhosis (total Abx 7 days).     Alcoholic cirrhosis of liver without ascites (HCC)   Assessment & Plan    -MELD NA 14, Maddrey 38 (consider glucocorticoid), child-yu 7 class B (indication for transplant evaluation, periop mort 30%)    -counseled on cessation  -per GI, outpt f/u with labs q3mo for MELD-Na (CBC, CMP, and PT/INR); US screening for hepatocellular carcinoma q6mo     Hypomagnesemia   Assessment & Plan    -repleted     Thrombocytopenia (HCC)   Assessment & Plan    -likely related to blood loss, possibly multifactorial with hx of alcoholism; severe  -received 1ut of platelets. plt count stable after tranfusion. No further s/s of bleed.     Normocytic anemia due to blood loss   Assessment & Plan    -patient with GI bleed, see plan for \"GI Bleed\"     Alcohol abuse   Assessment & Plan    -Alcohol cessation resources and information offered  -Follow-up outpatient     Withdrawal symptoms, alcohol (HCC)   Assessment & Plan    - no hx of alcohol withdrawal seizures or delirium tremens, 3 years sober but relapse for the past 2 weeks, 3-4 1/2 glasses of hard liquor per day  - Multivitamins IV, p.o. Multivitamins/thiamine/folate  - resolved. no further s/s of withdrawal. dc'ed SHERI.  - counseled cessation.    "       Consultants:     Gastroenterology    Procedures:        EGD 4/13/19    Imaging/ Testing:      No orders to display         Discharge Medications:         Medication Reconciliation: Completed       Medication List      START taking these medications      Instructions   folic acid 1 MG Tabs  Start taking on:  4/15/2019  Commonly known as:  FOLVITE   Take 1 Tab by mouth every day.  Dose:  1 mg     multivitamin Tabs  Start taking on:  4/15/2019   Take 1 Tab by mouth every day.  Dose:  1 Tab     pantoprazole 40 MG Tbec  Commonly known as:  PROTONIX   Take 1 Tab by mouth 2 times a day for 56 days.  Dose:  40 mg     sulfamethoxazole-trimethoprim 800-160 MG tablet  Commonly known as:  BACTRIM DS   Take 1 Tab by mouth 2 times a day for 5 days.  Dose:  1 Tab     thiamine 100 MG tablet  Start taking on:  4/15/2019  Commonly known as:  THIAMINE   Take 1 Tab by mouth every day.  Dose:  100 mg             Disposition:   Home     Diet:   GI soft    Activity:   As tolerated    Instructions:         The patient was instructed to return to the ER in the event of worsening symptoms. I have counseled the patient on the importance of compliance and the patient has agreed to proceed with all medical recommendations and follow up plan indicated above.   The patient understands that all medications come with benefits and risks. Risks may include permanent injury or death and these risks can be minimized with close reassessment and monitoring.        Primary Care Provider:    To establish with a PCP  Discharge summary faxed to primary care provider:  Deferred  Copy of discharge summary given to the patient: Deferred      Follow up appointment details :         Jose Andrade D.O.  [address] [place] [id number] [phone]    Schedule an appointment as soon as possible for a visit in 6 weeks      Establish with a primary care provider    Schedule an appointment as soon as possible for a visit in 1 week  For hospital follow  up        Pending Studies:        none    Time spent on discharge day patient visit, preparing discharge paperwork and arranging for patient follow up.    Summary of follow up issues:   - follow up with GI for further management of cirrhosis and complications of cirrhosis    Discharge Time (Minutes) :    60 minutes  Hospital Course Type:  Inpatient Stay >2 midnights         Most Recent Labs:    Lab Results   Component Value Date/Time    WBC 4.2 (L) 04/14/2019 05:47 AM    RBC 2.89 (L) 04/14/2019 05:47 AM    HEMOGLOBIN 8.5 (L) 04/14/2019 05:47 AM    HEMATOCRIT 25.3 (L) 04/14/2019 05:47 AM    MCV 87.5 04/14/2019 05:47 AM    MCH 29.4 04/14/2019 05:47 AM    MCHC 33.6 (L) 04/14/2019 05:47 AM    MPV 9.9 04/14/2019 05:47 AM    NEUTSPOLYS 89.80 (H) 04/12/2019 08:15 PM    LYMPHOCYTES 6.20 (L) 04/12/2019 08:15 PM    MONOCYTES 3.60 04/12/2019 08:15 PM    EOSINOPHILS 0.10 04/12/2019 08:15 PM    BASOPHILS 0.10 04/12/2019 08:15 PM      Lab Results   Component Value Date/Time    SODIUM 139 04/13/2019 11:30 PM    POTASSIUM 3.8 04/13/2019 11:30 PM    CHLORIDE 111 04/13/2019 11:30 PM    CO2 22 04/13/2019 11:30 PM    GLUCOSE 92 04/13/2019 11:30 PM    BUN 11 04/13/2019 11:30 PM    CREATININE 0.65 04/13/2019 11:30 PM      Lab Results   Component Value Date/Time    ALTSGPT 27 04/13/2019 11:30 PM    ASTSGOT 64 (H) 04/13/2019 11:30 PM    ALKPHOSPHAT 63 04/13/2019 11:30 PM    TBILIRUBIN 1.2 04/13/2019 11:30 PM    LIPASE 24 04/12/2019 08:15 PM    ALBUMIN 2.9 (L) 04/13/2019 11:30 PM    GLOBULIN 2.7 04/13/2019 11:30 PM    INR 1.80 (H) 04/12/2019 11:48 PM     Lab Results   Component Value Date/Time    PROTHROMBTM 21.0 (H) 04/12/2019 11:48 PM    INR 1.80 (H) 04/12/2019 11:48 PM

## 2019-04-14 NOTE — ASSESSMENT & PLAN NOTE
-MELD NA 14, Maddrey 38 (consider glucocorticoid), child-yu 7 class B (indication for transplant evaluation, periop mort 30%)    -counseled on cessation  -per GI, outpt f/u with labs q3mo for MELD-Na (CBC, CMP, and PT/INR); US screening for hepatocellular carcinoma q6mo

## 2019-04-14 NOTE — PROGRESS NOTES
Assumed care of pt, beside report received from HEENA Mejia. Updated on POC, call light within reach all fall precautions within place. Bed locked and lowered. Pt instructed to call for assistance before getting up. All questions answered, no other needs at this time.

## 2019-09-26 ENCOUNTER — APPOINTMENT (OUTPATIENT)
Dept: RADIOLOGY | Facility: MEDICAL CENTER | Age: 44
DRG: 432 | End: 2019-09-26
Attending: EMERGENCY MEDICINE
Payer: COMMERCIAL

## 2019-09-26 ENCOUNTER — HOSPITAL ENCOUNTER (INPATIENT)
Facility: MEDICAL CENTER | Age: 44
LOS: 3 days | DRG: 432 | End: 2019-09-29
Attending: EMERGENCY MEDICINE | Admitting: HOSPITALIST
Payer: COMMERCIAL

## 2019-09-26 ENCOUNTER — APPOINTMENT (OUTPATIENT)
Dept: RADIOLOGY | Facility: MEDICAL CENTER | Age: 44
DRG: 432 | End: 2019-09-26
Attending: INTERNAL MEDICINE
Payer: COMMERCIAL

## 2019-09-26 DIAGNOSIS — K92.2 UPPER GI BLEED: ICD-10-CM

## 2019-09-26 DIAGNOSIS — K92.2 GASTROINTESTINAL HEMORRHAGE, UNSPECIFIED GASTROINTESTINAL HEMORRHAGE TYPE: ICD-10-CM

## 2019-09-26 DIAGNOSIS — R57.8 HEMORRHAGIC SHOCK (HCC): ICD-10-CM

## 2019-09-26 DIAGNOSIS — D62 ACUTE BLOOD LOSS ANEMIA: ICD-10-CM

## 2019-09-26 PROBLEM — D64.9 ANEMIA REQUIRING TRANSFUSIONS: Status: ACTIVE | Noted: 2019-09-26

## 2019-09-26 LAB
ABO GROUP BLD: NORMAL
ALBUMIN SERPL BCP-MCNC: 2.8 G/DL (ref 3.2–4.9)
ALBUMIN/GLOB SERPL: 1 G/DL
ALP SERPL-CCNC: 45 U/L (ref 30–99)
ALT SERPL-CCNC: 91 U/L (ref 2–50)
ANION GAP SERPL CALC-SCNC: 10 MMOL/L (ref 0–11.9)
ANISOCYTOSIS BLD QL SMEAR: ABNORMAL
APTT PPP: 34 SEC (ref 24.7–36)
AST SERPL-CCNC: 170 U/L (ref 12–45)
BARCODED ABORH UBTYP: 6200
BARCODED PRD CODE UBPRD: NORMAL
BARCODED UNIT NUM UBUNT: NORMAL
BASOPHILS # BLD AUTO: 0.2 % (ref 0–1.8)
BASOPHILS # BLD: 0.02 K/UL (ref 0–0.12)
BILIRUB SERPL-MCNC: 1 MG/DL (ref 0.1–1.5)
BLD GP AB SCN SERPL QL: NORMAL
BUN SERPL-MCNC: 23 MG/DL (ref 8–22)
BURR CELLS BLD QL SMEAR: NORMAL
CALCIUM SERPL-MCNC: 7 MG/DL (ref 8.5–10.5)
CHLORIDE SERPL-SCNC: 111 MMOL/L (ref 96–112)
CO2 SERPL-SCNC: 15 MMOL/L (ref 20–33)
COMMENT 1642: NORMAL
COMPONENT R 8504R: NORMAL
CREAT SERPL-MCNC: 0.58 MG/DL (ref 0.5–1.4)
EKG IMPRESSION: NORMAL
EOSINOPHIL # BLD AUTO: 0 K/UL (ref 0–0.51)
EOSINOPHIL NFR BLD: 0 % (ref 0–6.9)
ERYTHROCYTE [DISTWIDTH] IN BLOOD BY AUTOMATED COUNT: 46.4 FL (ref 35.9–50)
ETHANOL BLD-MCNC: 0 G/DL
GLOBULIN SER CALC-MCNC: 2.7 G/DL (ref 1.9–3.5)
GLUCOSE SERPL-MCNC: 165 MG/DL (ref 65–99)
HAV IGM SERPL QL IA: NEGATIVE
HBV CORE IGM SER QL: NEGATIVE
HBV SURFACE AG SER QL: NEGATIVE
HCT VFR BLD AUTO: 17.6 % (ref 42–52)
HCV AB SER QL: NEGATIVE
HGB BLD-MCNC: 5.2 G/DL (ref 14–18)
HYPOCHROMIA BLD QL SMEAR: ABNORMAL
IMM GRANULOCYTES # BLD AUTO: 0.05 K/UL (ref 0–0.11)
IMM GRANULOCYTES NFR BLD AUTO: 0.4 % (ref 0–0.9)
INR PPP: 1.82 (ref 0.87–1.13)
LIPASE SERPL-CCNC: 66 U/L (ref 11–82)
LYMPHOCYTES # BLD AUTO: 1.03 K/UL (ref 1–4.8)
LYMPHOCYTES NFR BLD: 8.9 % (ref 22–41)
MCH RBC QN AUTO: 22.3 PG (ref 27–33)
MCHC RBC AUTO-ENTMCNC: 29.5 G/DL (ref 33.7–35.3)
MCV RBC AUTO: 75.5 FL (ref 81.4–97.8)
MICROCYTES BLD QL SMEAR: ABNORMAL
MONOCYTES # BLD AUTO: 0.48 K/UL (ref 0–0.85)
MONOCYTES NFR BLD AUTO: 4.2 % (ref 0–13.4)
MORPHOLOGY BLD-IMP: NORMAL
NEUTROPHILS # BLD AUTO: 9.97 K/UL (ref 1.82–7.42)
NEUTROPHILS NFR BLD: 86.3 % (ref 44–72)
NRBC # BLD AUTO: 0 K/UL
NRBC BLD-RTO: 0 /100 WBC
OVALOCYTES BLD QL SMEAR: NORMAL
PLATELET # BLD AUTO: 194 K/UL (ref 164–446)
PLATELET BLD QL SMEAR: NORMAL
PMV BLD AUTO: 11 FL (ref 9–12.9)
POIKILOCYTOSIS BLD QL SMEAR: NORMAL
POLYCHROMASIA BLD QL SMEAR: NORMAL
POTASSIUM SERPL-SCNC: 6 MMOL/L (ref 3.6–5.5)
PRODUCT TYPE UPROD: NORMAL
PROT SERPL-MCNC: 5.5 G/DL (ref 6–8.2)
PROTHROMBIN TIME: 21.6 SEC (ref 12–14.6)
RBC # BLD AUTO: 2.33 M/UL (ref 4.7–6.1)
RBC BLD AUTO: PRESENT
RH BLD: NORMAL
SODIUM SERPL-SCNC: 136 MMOL/L (ref 135–145)
UNIT STATUS USTAT: NORMAL
WBC # BLD AUTO: 11.6 K/UL (ref 4.8–10.8)

## 2019-09-26 PROCEDURE — 99291 CRITICAL CARE FIRST HOUR: CPT | Performed by: INTERNAL MEDICINE

## 2019-09-26 PROCEDURE — 770022 HCHG ROOM/CARE - ICU (200)

## 2019-09-26 PROCEDURE — 700105 HCHG RX REV CODE 258: Performed by: EMERGENCY MEDICINE

## 2019-09-26 PROCEDURE — 86923 COMPATIBILITY TEST ELECTRIC: CPT | Mod: 91

## 2019-09-26 PROCEDURE — 36415 COLL VENOUS BLD VENIPUNCTURE: CPT

## 2019-09-26 PROCEDURE — C9113 INJ PANTOPRAZOLE SODIUM, VIA: HCPCS | Performed by: HOSPITALIST

## 2019-09-26 PROCEDURE — 86900 BLOOD TYPING SEROLOGIC ABO: CPT

## 2019-09-26 PROCEDURE — 30233N1 TRANSFUSION OF NONAUTOLOGOUS RED BLOOD CELLS INTO PERIPHERAL VEIN, PERCUTANEOUS APPROACH: ICD-10-PCS | Performed by: EMERGENCY MEDICINE

## 2019-09-26 PROCEDURE — 83690 ASSAY OF LIPASE: CPT

## 2019-09-26 PROCEDURE — 36430 TRANSFUSION BLD/BLD COMPNT: CPT

## 2019-09-26 PROCEDURE — 700111 HCHG RX REV CODE 636 W/ 250 OVERRIDE (IP): Performed by: EMERGENCY MEDICINE

## 2019-09-26 PROCEDURE — 96375 TX/PRO/DX INJ NEW DRUG ADDON: CPT

## 2019-09-26 PROCEDURE — P9016 RBC LEUKOCYTES REDUCED: HCPCS | Mod: 91

## 2019-09-26 PROCEDURE — 304561 HCHG STAT O2

## 2019-09-26 PROCEDURE — 80053 COMPREHEN METABOLIC PANEL: CPT

## 2019-09-26 PROCEDURE — HZ2ZZZZ DETOXIFICATION SERVICES FOR SUBSTANCE ABUSE TREATMENT: ICD-10-PCS | Performed by: HOSPITALIST

## 2019-09-26 PROCEDURE — 700105 HCHG RX REV CODE 258: Performed by: HOSPITALIST

## 2019-09-26 PROCEDURE — 99291 CRITICAL CARE FIRST HOUR: CPT

## 2019-09-26 PROCEDURE — 85730 THROMBOPLASTIN TIME PARTIAL: CPT

## 2019-09-26 PROCEDURE — 86850 RBC ANTIBODY SCREEN: CPT

## 2019-09-26 PROCEDURE — 700105 HCHG RX REV CODE 258: Performed by: INTERNAL MEDICINE

## 2019-09-26 PROCEDURE — 700111 HCHG RX REV CODE 636 W/ 250 OVERRIDE (IP): Performed by: INTERNAL MEDICINE

## 2019-09-26 PROCEDURE — 82105 ALPHA-FETOPROTEIN SERUM: CPT

## 2019-09-26 PROCEDURE — 76700 US EXAM ABDOM COMPLETE: CPT

## 2019-09-26 PROCEDURE — C9113 INJ PANTOPRAZOLE SODIUM, VIA: HCPCS | Performed by: EMERGENCY MEDICINE

## 2019-09-26 PROCEDURE — 94760 N-INVAS EAR/PLS OXIMETRY 1: CPT

## 2019-09-26 PROCEDURE — 700111 HCHG RX REV CODE 636 W/ 250 OVERRIDE (IP): Performed by: HOSPITALIST

## 2019-09-26 PROCEDURE — 85610 PROTHROMBIN TIME: CPT

## 2019-09-26 PROCEDURE — 71045 X-RAY EXAM CHEST 1 VIEW: CPT

## 2019-09-26 PROCEDURE — 80307 DRUG TEST PRSMV CHEM ANLYZR: CPT

## 2019-09-26 PROCEDURE — 85025 COMPLETE CBC W/AUTO DIFF WBC: CPT

## 2019-09-26 PROCEDURE — 86901 BLOOD TYPING SEROLOGIC RH(D): CPT

## 2019-09-26 PROCEDURE — 99223 1ST HOSP IP/OBS HIGH 75: CPT | Performed by: HOSPITALIST

## 2019-09-26 PROCEDURE — 80074 ACUTE HEPATITIS PANEL: CPT

## 2019-09-26 PROCEDURE — 93005 ELECTROCARDIOGRAM TRACING: CPT | Performed by: EMERGENCY MEDICINE

## 2019-09-26 PROCEDURE — 96374 THER/PROPH/DIAG INJ IV PUSH: CPT

## 2019-09-26 RX ORDER — SODIUM CHLORIDE, SODIUM LACTATE, POTASSIUM CHLORIDE, CALCIUM CHLORIDE 600; 310; 30; 20 MG/100ML; MG/100ML; MG/100ML; MG/100ML
INJECTION, SOLUTION INTRAVENOUS CONTINUOUS
Status: DISCONTINUED | OUTPATIENT
Start: 2019-09-26 | End: 2019-09-28

## 2019-09-26 RX ORDER — SODIUM CHLORIDE 9 MG/ML
1000 INJECTION, SOLUTION INTRAVENOUS ONCE
Status: COMPLETED | OUTPATIENT
Start: 2019-09-26 | End: 2019-09-27

## 2019-09-26 RX ORDER — LORAZEPAM 2 MG/ML
1 INJECTION INTRAMUSCULAR
Status: DISCONTINUED | OUTPATIENT
Start: 2019-09-26 | End: 2019-09-29 | Stop reason: HOSPADM

## 2019-09-26 RX ORDER — IBUPROFEN 800 MG/1
800 TABLET ORAL ONCE
Status: ON HOLD | COMMUNITY
End: 2019-09-29

## 2019-09-26 RX ORDER — AMOXICILLIN 250 MG
2 CAPSULE ORAL 2 TIMES DAILY
Status: DISCONTINUED | OUTPATIENT
Start: 2019-09-27 | End: 2019-09-29 | Stop reason: HOSPADM

## 2019-09-26 RX ORDER — POLYETHYLENE GLYCOL 3350 17 G/17G
1 POWDER, FOR SOLUTION ORAL
Status: DISCONTINUED | OUTPATIENT
Start: 2019-09-26 | End: 2019-09-29 | Stop reason: HOSPADM

## 2019-09-26 RX ORDER — LORAZEPAM 2 MG/ML
4 INJECTION INTRAMUSCULAR
Status: DISCONTINUED | OUTPATIENT
Start: 2019-09-26 | End: 2019-09-29 | Stop reason: HOSPADM

## 2019-09-26 RX ORDER — ONDANSETRON 2 MG/ML
4 INJECTION INTRAMUSCULAR; INTRAVENOUS EVERY 4 HOURS PRN
Status: DISCONTINUED | OUTPATIENT
Start: 2019-09-26 | End: 2019-09-29 | Stop reason: HOSPADM

## 2019-09-26 RX ORDER — FOLIC ACID 1 MG/1
1 TABLET ORAL DAILY
Status: DISCONTINUED | OUTPATIENT
Start: 2019-09-27 | End: 2019-09-29 | Stop reason: HOSPADM

## 2019-09-26 RX ORDER — OCTREOTIDE ACETATE 100 UG/ML
50 INJECTION, SOLUTION INTRAVENOUS; SUBCUTANEOUS ONCE
Status: COMPLETED | OUTPATIENT
Start: 2019-09-26 | End: 2019-09-26

## 2019-09-26 RX ORDER — LORAZEPAM 2 MG/ML
3 INJECTION INTRAMUSCULAR
Status: DISCONTINUED | OUTPATIENT
Start: 2019-09-26 | End: 2019-09-29 | Stop reason: HOSPADM

## 2019-09-26 RX ORDER — PROMETHAZINE HYDROCHLORIDE 25 MG/1
12.5-25 SUPPOSITORY RECTAL EVERY 4 HOURS PRN
Status: DISCONTINUED | OUTPATIENT
Start: 2019-09-26 | End: 2019-09-29 | Stop reason: HOSPADM

## 2019-09-26 RX ORDER — THIAMINE MONONITRATE (VIT B1) 100 MG
100 TABLET ORAL DAILY
Status: DISCONTINUED | OUTPATIENT
Start: 2019-09-27 | End: 2019-09-29 | Stop reason: HOSPADM

## 2019-09-26 RX ORDER — LORAZEPAM 2 MG/ML
2 INJECTION INTRAMUSCULAR
Status: DISCONTINUED | OUTPATIENT
Start: 2019-09-26 | End: 2019-09-29 | Stop reason: HOSPADM

## 2019-09-26 RX ORDER — ONDANSETRON 4 MG/1
4 TABLET, ORALLY DISINTEGRATING ORAL EVERY 4 HOURS PRN
Status: DISCONTINUED | OUTPATIENT
Start: 2019-09-26 | End: 2019-09-29 | Stop reason: HOSPADM

## 2019-09-26 RX ORDER — PROMETHAZINE HYDROCHLORIDE 25 MG/1
12.5-25 TABLET ORAL EVERY 4 HOURS PRN
Status: DISCONTINUED | OUTPATIENT
Start: 2019-09-26 | End: 2019-09-29 | Stop reason: HOSPADM

## 2019-09-26 RX ORDER — BISACODYL 10 MG
10 SUPPOSITORY, RECTAL RECTAL
Status: DISCONTINUED | OUTPATIENT
Start: 2019-09-26 | End: 2019-09-29 | Stop reason: HOSPADM

## 2019-09-26 RX ORDER — PROCHLORPERAZINE EDISYLATE 5 MG/ML
5-10 INJECTION INTRAMUSCULAR; INTRAVENOUS EVERY 4 HOURS PRN
Status: DISCONTINUED | OUTPATIENT
Start: 2019-09-26 | End: 2019-09-29 | Stop reason: HOSPADM

## 2019-09-26 RX ADMIN — LORAZEPAM 1 MG: 2 INJECTION INTRAMUSCULAR; INTRAVENOUS at 23:02

## 2019-09-26 RX ADMIN — SODIUM CHLORIDE 80 MG: 9 INJECTION, SOLUTION INTRAVENOUS at 19:37

## 2019-09-26 RX ADMIN — SODIUM CHLORIDE 1000 ML: 9 INJECTION, SOLUTION INTRAVENOUS at 19:29

## 2019-09-26 RX ADMIN — ONDANSETRON 4 MG: 2 INJECTION INTRAMUSCULAR; INTRAVENOUS at 22:14

## 2019-09-26 RX ADMIN — OCTREOTIDE ACETATE 50 MCG/HR: 200 INJECTION, SOLUTION INTRAVENOUS; SUBCUTANEOUS at 22:05

## 2019-09-26 RX ADMIN — CEFTRIAXONE SODIUM 2 G: 2 INJECTION, POWDER, FOR SOLUTION INTRAMUSCULAR; INTRAVENOUS at 22:08

## 2019-09-26 RX ADMIN — OCTREOTIDE ACETATE 50 MCG: 100 INJECTION, SOLUTION INTRAVENOUS; SUBCUTANEOUS at 20:30

## 2019-09-26 RX ADMIN — SODIUM CHLORIDE 8 MG/HR: 9 INJECTION, SOLUTION INTRAVENOUS at 22:05

## 2019-09-26 RX ADMIN — SODIUM CHLORIDE, POTASSIUM CHLORIDE, SODIUM LACTATE AND CALCIUM CHLORIDE: 600; 310; 30; 20 INJECTION, SOLUTION INTRAVENOUS at 22:06

## 2019-09-26 SDOH — HEALTH STABILITY: MENTAL HEALTH: HOW OFTEN DO YOU HAVE A DRINK CONTAINING ALCOHOL?: 4 OR MORE TIMES A WEEK

## 2019-09-26 SDOH — HEALTH STABILITY: MENTAL HEALTH: HOW MANY STANDARD DRINKS CONTAINING ALCOHOL DO YOU HAVE ON A TYPICAL DAY?: 5 OR 6

## 2019-09-26 SDOH — HEALTH STABILITY: MENTAL HEALTH: HOW OFTEN DO YOU HAVE 6 OR MORE DRINKS ON ONE OCCASION?: DAILY OR ALMOST DAILY

## 2019-09-26 ASSESSMENT — LIFESTYLE VARIABLES
DOES PATIENT WANT TO STOP DRINKING: YES
DOES PATIENT WANT TO TALK TO SOMEONE ABOUT QUITTING: NO
EVER HAD A DRINK FIRST THING IN THE MORNING TO STEADY YOUR NERVES TO GET RID OF A HANGOVER: YES
TOTAL SCORE: 4
HOW MANY TIMES IN THE PAST YEAR HAVE YOU HAD 5 OR MORE DRINKS IN A DAY: 14
ALCOHOL_USE: YES
TOTAL SCORE: 4
TOTAL SCORE: 4
CONSUMPTION TOTAL: POSITIVE
HAVE PEOPLE ANNOYED YOU BY CRITICIZING YOUR DRINKING: YES
EVER FELT BAD OR GUILTY ABOUT YOUR DRINKING: YES
AVERAGE NUMBER OF DAYS PER WEEK YOU HAVE A DRINK CONTAINING ALCOHOL: 7
ON A TYPICAL DAY WHEN YOU DRINK ALCOHOL HOW MANY DRINKS DO YOU HAVE: 5
HAVE YOU EVER FELT YOU SHOULD CUT DOWN ON YOUR DRINKING: YES

## 2019-09-26 ASSESSMENT — COGNITIVE AND FUNCTIONAL STATUS - GENERAL
TURNING FROM BACK TO SIDE WHILE IN FLAT BAD: A LITTLE
TOILETING: A LITTLE
HELP NEEDED FOR BATHING: A LITTLE
MOVING FROM LYING ON BACK TO SITTING ON SIDE OF FLAT BED: A LITTLE
CLIMB 3 TO 5 STEPS WITH RAILING: A LOT
MOBILITY SCORE: 17
DRESSING REGULAR UPPER BODY CLOTHING: A LITTLE
WALKING IN HOSPITAL ROOM: A LITTLE
DAILY ACTIVITIY SCORE: 18
STANDING UP FROM CHAIR USING ARMS: A LITTLE
SUGGESTED CMS G CODE MODIFIER MOBILITY: CK
PERSONAL GROOMING: A LITTLE
DRESSING REGULAR LOWER BODY CLOTHING: A LITTLE
EATING MEALS: A LITTLE
MOVING TO AND FROM BED TO CHAIR: A LITTLE
SUGGESTED CMS G CODE MODIFIER DAILY ACTIVITY: CK

## 2019-09-26 ASSESSMENT — PATIENT HEALTH QUESTIONNAIRE - PHQ9
2. FEELING DOWN, DEPRESSED, IRRITABLE, OR HOPELESS: NOT AT ALL
1. LITTLE INTEREST OR PLEASURE IN DOING THINGS: NOT AT ALL
SUM OF ALL RESPONSES TO PHQ9 QUESTIONS 1 AND 2: 0

## 2019-09-27 ENCOUNTER — APPOINTMENT (OUTPATIENT)
Dept: RADIOLOGY | Facility: MEDICAL CENTER | Age: 44
DRG: 432 | End: 2019-09-27
Attending: INTERNAL MEDICINE
Payer: COMMERCIAL

## 2019-09-27 PROBLEM — E83.51 HYPOCALCEMIA: Status: ACTIVE | Noted: 2019-09-27

## 2019-09-27 PROBLEM — R79.89 INCREASED AMMONIA LEVEL: Status: ACTIVE | Noted: 2019-09-27

## 2019-09-27 LAB
AMMONIA PLAS-SCNC: 174 UMOL/L (ref 11–45)
ANION GAP SERPL CALC-SCNC: 6 MMOL/L (ref 0–11.9)
BARCODED ABORH UBTYP: 7300
BARCODED ABORH UBTYP: 7300
BARCODED PRD CODE UBPRD: NORMAL
BARCODED PRD CODE UBPRD: NORMAL
BARCODED UNIT NUM UBUNT: NORMAL
BARCODED UNIT NUM UBUNT: NORMAL
BASOPHILS # BLD AUTO: 0.6 % (ref 0–1.8)
BASOPHILS # BLD AUTO: 0.9 % (ref 0–1.8)
BASOPHILS # BLD: 0.03 K/UL (ref 0–0.12)
BASOPHILS # BLD: 0.04 K/UL (ref 0–0.12)
BUN SERPL-MCNC: 26 MG/DL (ref 8–22)
CA-I SERPL-SCNC: 1 MMOL/L (ref 1.1–1.3)
CALCIUM SERPL-MCNC: 6.6 MG/DL (ref 8.5–10.5)
CFT BLD TEG: 4.7 MIN (ref 5–10)
CHLORIDE SERPL-SCNC: 114 MMOL/L (ref 96–112)
CLOT ANGLE BLD TEG: 55.6 DEGREES (ref 53–72)
CLOT LYSIS 30M P MA LENFR BLD TEG: 0 % (ref 0–8)
CO2 SERPL-SCNC: 21 MMOL/L (ref 20–33)
COMPONENT P 8504P: NORMAL
COMPONENT P 8504P: NORMAL
CREAT SERPL-MCNC: 0.68 MG/DL (ref 0.5–1.4)
CT.EXTRINSIC BLD ROTEM: 3.2 MIN (ref 1–3)
EOSINOPHIL # BLD AUTO: 0.07 K/UL (ref 0–0.51)
EOSINOPHIL # BLD AUTO: 0.1 K/UL (ref 0–0.51)
EOSINOPHIL NFR BLD: 1.3 % (ref 0–6.9)
EOSINOPHIL NFR BLD: 2.2 % (ref 0–6.9)
ERYTHROCYTE [DISTWIDTH] IN BLOOD BY AUTOMATED COUNT: 45.8 FL (ref 35.9–50)
ERYTHROCYTE [DISTWIDTH] IN BLOOD BY AUTOMATED COUNT: 48.1 FL (ref 35.9–50)
ERYTHROCYTE [DISTWIDTH] IN BLOOD BY AUTOMATED COUNT: 50.4 FL (ref 35.9–50)
GLUCOSE SERPL-MCNC: 140 MG/DL (ref 65–99)
HCT VFR BLD AUTO: 17.2 % (ref 42–52)
HCT VFR BLD AUTO: 20.5 % (ref 42–52)
HCT VFR BLD AUTO: 23.7 % (ref 42–52)
HGB BLD-MCNC: 5.4 G/DL (ref 14–18)
HGB BLD-MCNC: 6.6 G/DL (ref 14–18)
HGB BLD-MCNC: 7.4 G/DL (ref 14–18)
HGB BLD-MCNC: 7.7 G/DL (ref 14–18)
IMM GRANULOCYTES # BLD AUTO: 0.02 K/UL (ref 0–0.11)
IMM GRANULOCYTES # BLD AUTO: 0.02 K/UL (ref 0–0.11)
IMM GRANULOCYTES NFR BLD AUTO: 0.4 % (ref 0–0.9)
IMM GRANULOCYTES NFR BLD AUTO: 0.4 % (ref 0–0.9)
INR PPP: 1.81 (ref 0.87–1.13)
LYMPHOCYTES # BLD AUTO: 0.89 K/UL (ref 1–4.8)
LYMPHOCYTES # BLD AUTO: 0.97 K/UL (ref 1–4.8)
LYMPHOCYTES NFR BLD: 18 % (ref 22–41)
LYMPHOCYTES NFR BLD: 19.7 % (ref 22–41)
MAGNESIUM SERPL-MCNC: 1.2 MG/DL (ref 1.5–2.5)
MCF BLD TEG: 50.9 MM (ref 50–70)
MCH RBC QN AUTO: 23.7 PG (ref 27–33)
MCH RBC QN AUTO: 24.8 PG (ref 27–33)
MCH RBC QN AUTO: 25 PG (ref 27–33)
MCHC RBC AUTO-ENTMCNC: 31.2 G/DL (ref 33.7–35.3)
MCHC RBC AUTO-ENTMCNC: 31.6 G/DL (ref 33.7–35.3)
MCHC RBC AUTO-ENTMCNC: 32.2 G/DL (ref 33.7–35.3)
MCV RBC AUTO: 75 FL (ref 81.4–97.8)
MCV RBC AUTO: 77.7 FL (ref 81.4–97.8)
MCV RBC AUTO: 79.5 FL (ref 81.4–97.8)
MONOCYTES # BLD AUTO: 0.35 K/UL (ref 0–0.85)
MONOCYTES # BLD AUTO: 0.52 K/UL (ref 0–0.85)
MONOCYTES NFR BLD AUTO: 7.7 % (ref 0–13.4)
MONOCYTES NFR BLD AUTO: 9.7 % (ref 0–13.4)
NEUTROPHILS # BLD AUTO: 3.12 K/UL (ref 1.82–7.42)
NEUTROPHILS # BLD AUTO: 3.77 K/UL (ref 1.82–7.42)
NEUTROPHILS NFR BLD: 69.1 % (ref 44–72)
NEUTROPHILS NFR BLD: 70 % (ref 44–72)
NRBC # BLD AUTO: 0 K/UL
NRBC # BLD AUTO: 0 K/UL
NRBC BLD-RTO: 0 /100 WBC
NRBC BLD-RTO: 0 /100 WBC
PHOSPHATE SERPL-MCNC: 2.5 MG/DL (ref 2.5–4.5)
PLATELET # BLD AUTO: 70 K/UL (ref 164–446)
PLATELET # BLD AUTO: 72 K/UL (ref 164–446)
PLATELET # BLD AUTO: 77 K/UL (ref 164–446)
PMV BLD AUTO: 10.4 FL (ref 9–12.9)
PMV BLD AUTO: 10.5 FL (ref 9–12.9)
PMV BLD AUTO: 10.8 FL (ref 9–12.9)
POTASSIUM SERPL-SCNC: 4 MMOL/L (ref 3.6–5.5)
PRODUCT TYPE UPROD: NORMAL
PRODUCT TYPE UPROD: NORMAL
PROTHROMBIN TIME: 21.5 SEC (ref 12–14.6)
RBC # BLD AUTO: 2.28 M/UL (ref 4.7–6.1)
RBC # BLD AUTO: 2.64 M/UL (ref 4.7–6.1)
RBC # BLD AUTO: 2.98 M/UL (ref 4.7–6.1)
SODIUM SERPL-SCNC: 141 MMOL/L (ref 135–145)
TEG ALGORITHM TGALG: ABNORMAL
UNIT STATUS USTAT: NORMAL
UNIT STATUS USTAT: NORMAL
WBC # BLD AUTO: 4.5 K/UL (ref 4.8–10.8)
WBC # BLD AUTO: 5.4 K/UL (ref 4.8–10.8)
WBC # BLD AUTO: 6.7 K/UL (ref 4.8–10.8)

## 2019-09-27 PROCEDURE — 85347 COAGULATION TIME ACTIVATED: CPT

## 2019-09-27 PROCEDURE — 06L38CZ OCCLUSION OF ESOPHAGEAL VEIN WITH EXTRALUMINAL DEVICE, VIA NATURAL OR ARTIFICIAL OPENING ENDOSCOPIC: ICD-10-PCS | Performed by: INTERNAL MEDICINE

## 2019-09-27 PROCEDURE — 71045 X-RAY EXAM CHEST 1 VIEW: CPT

## 2019-09-27 PROCEDURE — 502240 HCHG MISC OR SUPPLY RC 0272: Performed by: INTERNAL MEDICINE

## 2019-09-27 PROCEDURE — 85027 COMPLETE CBC AUTOMATED: CPT

## 2019-09-27 PROCEDURE — 85576 BLOOD PLATELET AGGREGATION: CPT

## 2019-09-27 PROCEDURE — 30233R1 TRANSFUSION OF NONAUTOLOGOUS PLATELETS INTO PERIPHERAL VEIN, PERCUTANEOUS APPROACH: ICD-10-PCS | Performed by: HOSPITALIST

## 2019-09-27 PROCEDURE — 500066 HCHG BITE BLOCK, ECT: Performed by: INTERNAL MEDICINE

## 2019-09-27 PROCEDURE — 85610 PROTHROMBIN TIME: CPT

## 2019-09-27 PROCEDURE — 85384 FIBRINOGEN ACTIVITY: CPT

## 2019-09-27 PROCEDURE — P9034 PLATELETS, PHERESIS: HCPCS | Mod: 91

## 2019-09-27 PROCEDURE — 700101 HCHG RX REV CODE 250: Performed by: INTERNAL MEDICINE

## 2019-09-27 PROCEDURE — 84100 ASSAY OF PHOSPHORUS: CPT

## 2019-09-27 PROCEDURE — 80048 BASIC METABOLIC PNL TOTAL CA: CPT

## 2019-09-27 PROCEDURE — 160207 HCHG ENDO MINUTES - EA ADDL 1 MIN LEVEL 3: Performed by: INTERNAL MEDICINE

## 2019-09-27 PROCEDURE — P9016 RBC LEUKOCYTES REDUCED: HCPCS | Mod: 91

## 2019-09-27 PROCEDURE — 36430 TRANSFUSION BLD/BLD COMPNT: CPT

## 2019-09-27 PROCEDURE — 82140 ASSAY OF AMMONIA: CPT

## 2019-09-27 PROCEDURE — 02HV33Z INSERTION OF INFUSION DEVICE INTO SUPERIOR VENA CAVA, PERCUTANEOUS APPROACH: ICD-10-PCS | Performed by: INTERNAL MEDICINE

## 2019-09-27 PROCEDURE — 770022 HCHG ROOM/CARE - ICU (200)

## 2019-09-27 PROCEDURE — 700105 HCHG RX REV CODE 258: Performed by: INTERNAL MEDICINE

## 2019-09-27 PROCEDURE — 99233 SBSQ HOSP IP/OBS HIGH 50: CPT | Performed by: HOSPITALIST

## 2019-09-27 PROCEDURE — 700111 HCHG RX REV CODE 636 W/ 250 OVERRIDE (IP): Performed by: HOSPITALIST

## 2019-09-27 PROCEDURE — 83735 ASSAY OF MAGNESIUM: CPT

## 2019-09-27 PROCEDURE — 700111 HCHG RX REV CODE 636 W/ 250 OVERRIDE (IP): Performed by: INTERNAL MEDICINE

## 2019-09-27 PROCEDURE — 700105 HCHG RX REV CODE 258: Performed by: PSYCHIATRY & NEUROLOGY

## 2019-09-27 PROCEDURE — 82330 ASSAY OF CALCIUM: CPT

## 2019-09-27 PROCEDURE — 36556 INSERT NON-TUNNEL CV CATH: CPT | Mod: RT | Performed by: INTERNAL MEDICINE

## 2019-09-27 PROCEDURE — C1751 CATH, INF, PER/CENT/MIDLINE: HCPCS

## 2019-09-27 PROCEDURE — B548ZZA ULTRASONOGRAPHY OF SUPERIOR VENA CAVA, GUIDANCE: ICD-10-PCS | Performed by: INTERNAL MEDICINE

## 2019-09-27 PROCEDURE — 700105 HCHG RX REV CODE 258: Performed by: HOSPITALIST

## 2019-09-27 PROCEDURE — C9113 INJ PANTOPRAZOLE SODIUM, VIA: HCPCS | Performed by: HOSPITALIST

## 2019-09-27 PROCEDURE — 501629 HCHG TUBE, LUKI TRAP STERILE DISP: Performed by: INTERNAL MEDICINE

## 2019-09-27 PROCEDURE — 160048 HCHG OR STATISTICAL LEVEL 1-5: Performed by: INTERNAL MEDICINE

## 2019-09-27 PROCEDURE — 160202 HCHG ENDO MINUTES - 1ST 30 MINS LEVEL 3: Performed by: INTERNAL MEDICINE

## 2019-09-27 PROCEDURE — 700111 HCHG RX REV CODE 636 W/ 250 OVERRIDE (IP): Performed by: PSYCHIATRY & NEUROLOGY

## 2019-09-27 PROCEDURE — 85018 HEMOGLOBIN: CPT

## 2019-09-27 PROCEDURE — 99291 CRITICAL CARE FIRST HOUR: CPT | Mod: 25 | Performed by: PSYCHIATRY & NEUROLOGY

## 2019-09-27 PROCEDURE — 85025 COMPLETE CBC W/AUTO DIFF WBC: CPT | Mod: 91

## 2019-09-27 PROCEDURE — 86923 COMPATIBILITY TEST ELECTRIC: CPT

## 2019-09-27 PROCEDURE — 36556 INSERT NON-TUNNEL CV CATH: CPT

## 2019-09-27 RX ORDER — MIDAZOLAM HYDROCHLORIDE 1 MG/ML
5 INJECTION INTRAMUSCULAR; INTRAVENOUS ONCE
Status: COMPLETED | OUTPATIENT
Start: 2019-09-27 | End: 2019-09-27

## 2019-09-27 RX ORDER — MAGNESIUM SULFATE HEPTAHYDRATE 40 MG/ML
4 INJECTION, SOLUTION INTRAVENOUS ONCE
Status: COMPLETED | OUTPATIENT
Start: 2019-09-27 | End: 2019-09-27

## 2019-09-27 RX ORDER — OMEPRAZOLE 20 MG/1
40 CAPSULE, DELAYED RELEASE ORAL 2 TIMES DAILY
Status: DISCONTINUED | OUTPATIENT
Start: 2019-09-28 | End: 2019-09-29 | Stop reason: HOSPADM

## 2019-09-27 RX ORDER — MAGNESIUM SULFATE HEPTAHYDRATE 40 MG/ML
2 INJECTION, SOLUTION INTRAVENOUS ONCE
Status: COMPLETED | OUTPATIENT
Start: 2019-09-27 | End: 2019-09-27

## 2019-09-27 RX ADMIN — FENTANYL CITRATE 75 MCG: 50 INJECTION INTRAMUSCULAR; INTRAVENOUS at 12:01

## 2019-09-27 RX ADMIN — THIAMINE HYDROCHLORIDE: 100 INJECTION, SOLUTION INTRAMUSCULAR; INTRAVENOUS at 03:15

## 2019-09-27 RX ADMIN — FENTANYL CITRATE 50 MCG: 50 INJECTION INTRAMUSCULAR; INTRAVENOUS at 12:04

## 2019-09-27 RX ADMIN — MAGNESIUM SULFATE IN WATER 2 G: 40 INJECTION, SOLUTION INTRAVENOUS at 13:31

## 2019-09-27 RX ADMIN — LORAZEPAM 2 MG: 2 INJECTION INTRAMUSCULAR; INTRAVENOUS at 01:28

## 2019-09-27 RX ADMIN — SODIUM CHLORIDE, POTASSIUM CHLORIDE, SODIUM LACTATE AND CALCIUM CHLORIDE: 600; 310; 30; 20 INJECTION, SOLUTION INTRAVENOUS at 13:10

## 2019-09-27 RX ADMIN — CEFTRIAXONE SODIUM 2 G: 2 INJECTION, POWDER, FOR SOLUTION INTRAMUSCULAR; INTRAVENOUS at 20:23

## 2019-09-27 RX ADMIN — MIDAZOLAM 1 MG: 1 INJECTION INTRAMUSCULAR; INTRAVENOUS at 12:10

## 2019-09-27 RX ADMIN — MIDAZOLAM 1 MG: 1 INJECTION INTRAMUSCULAR; INTRAVENOUS at 12:04

## 2019-09-27 RX ADMIN — PHYTONADIONE 10 MG: 10 INJECTION, EMULSION INTRAMUSCULAR; INTRAVENOUS; SUBCUTANEOUS at 05:55

## 2019-09-27 RX ADMIN — CALCIUM GLUCONATE 2 G: 98 INJECTION, SOLUTION INTRAVENOUS at 05:55

## 2019-09-27 RX ADMIN — FENTANYL CITRATE 25 MCG: 50 INJECTION INTRAMUSCULAR; INTRAVENOUS at 12:10

## 2019-09-27 RX ADMIN — SODIUM CHLORIDE 8 MG/HR: 9 INJECTION, SOLUTION INTRAVENOUS at 11:55

## 2019-09-27 RX ADMIN — MAGNESIUM SULFATE IN WATER 4 G: 40 INJECTION, SOLUTION INTRAVENOUS at 08:57

## 2019-09-27 RX ADMIN — MIDAZOLAM 3 MG: 1 INJECTION INTRAMUSCULAR; INTRAVENOUS at 12:01

## 2019-09-27 RX ADMIN — LORAZEPAM 1 MG: 2 INJECTION INTRAMUSCULAR; INTRAVENOUS at 23:17

## 2019-09-27 RX ADMIN — CALCIUM GLUCONATE 2 G: 98 INJECTION, SOLUTION INTRAVENOUS at 11:30

## 2019-09-27 ASSESSMENT — ENCOUNTER SYMPTOMS
BACK PAIN: 0
WEAKNESS: 1
DEPRESSION: 1
HEADACHES: 0
NAUSEA: 0
COUGH: 0
BRUISES/BLEEDS EASILY: 0
SENSORY CHANGE: 0
DEPRESSION: 0
PALPITATIONS: 0
SHORTNESS OF BREATH: 0
STRIDOR: 0
DIZZINESS: 0
HEMOPTYSIS: 0
SORE THROAT: 0
FOCAL WEAKNESS: 0
NERVOUS/ANXIOUS: 0
FALLS: 0
MYALGIAS: 0
VOMITING: 1
DIARRHEA: 0
CHILLS: 0
ABDOMINAL PAIN: 1
NAUSEA: 1
VOMITING: 0
ABDOMINAL PAIN: 0
DIZZINESS: 1
SPUTUM PRODUCTION: 0
PHOTOPHOBIA: 0
BLOOD IN STOOL: 1
BLURRED VISION: 0
WHEEZING: 0
TINGLING: 0
FEVER: 0

## 2019-09-27 NOTE — ED NOTES
Report given to Sharonda NAIK bedside. Pt taken to RICU on monitor with ACLS RN and tech. Chart and belongings with patient. No acute signs distress present.

## 2019-09-27 NOTE — PROGRESS NOTES
Lab called with critical result of hgb at 5.2; hct at 17.6; PLT at 77; Ammonia 176; Calcium at 6.6. Critical lab result read back to lab  Dr. Mcleod notified of critical lab result at 0355  Critical lab result read back by Dr. Mcleod.    New orders given for 2 units prbc, 1 unit platelets, 2 gram calcium gluconate IV and 10mg vitamin K IV  Repeat CBC, PT,INR, ionized calcium after blood products and medications infused

## 2019-09-27 NOTE — DISCHARGE PLANNING
SW contacted emergency contact on file, spouse, Delfina. No one picked up. Called brother, Naz by phone. He reported he was here visiting patient. SW reported she would come visit for assessment.   SW went to patient's bay, brother was not present. RN reported she has not seen brother. SW to follow up later.

## 2019-09-27 NOTE — PROGRESS NOTES
After pt blood transfusion complete, LFA IV flushed and pt c/o pain/tenderness. Previous attempts at peripheral IV access unsuccessful. Dr. Courtney notified and stated he would come to bedside to insert central line

## 2019-09-27 NOTE — ED TRIAGE NOTES
45 y/o male [facility] for evaluation of bloody stools and 2 episodes of blood in his vomit. Pt states the symptoms began this morning. Pt has been taking ibuprofen once per day due to a back injury. RN in flight gave pt 2.5L NS, 50 mcg fent, 12.5 mg phenergan and 4 mg zofran.

## 2019-09-27 NOTE — OP REPORT
DATE OF SERVICE:  09/27/2019     INDICATION FOR PROCEDURE:  hematemesis    PROCEDURE PERFORMED: EGD with variceal ligation     CONSENT:  Informed consent was obtained directly from the patient after benefits, risks and possible alternatives were discussed.     MEDICATIONS:  The patient received 150 mcg of fentanyl and 5 mg of Versed given throughout the procedure. I personally administered sedation. Start time was 1201 and end time 1218       PROCEDURE DESCRIPTION:  The patient was placed in the left lateral decubitus position and provided with supplemental oxygen via nasal cannula.  Vital signs were monitored continuously throughout the procedure.  When ready, the upper endoscope was placed in the patient's mouth and advanced easily and carefully to the esophagus and subsequently to the stomach and duodenum.The scope was slowly withdrawn and mucosa carefully examined. Retroflexion was performed in the stomach. The patients toleration of this procedure was excellent     FINDINGS:    Esophagus: Large esophageal varices were present with red nisa signs present. These varices were not bleeding but at risk for bleeding so 3 ligation bands were placed with good decompression of the veins    Stomach: severe portal gastropathy. There was scant, minimal blood over some areas of the gastropathy but there was no active bleeding noted    Duodenum: normal         COMPLICATIONS:  No complications or blood loss during or in the immediate postoperative period.     IMPRESSION:  1.  severe hemorrhagic portal gastropathy. This could have bled in the setting of NSAID use  2.  Large esophageal varices with red nisa signs. 3 ligation bands were placed     RECOMMENDATION:    1. Continue octreotide x 24hours  2. Switch to BID PPI  3. Clear liquid diet until tomorrow  4. Antibiotics x 7 days total  5. Follow H/H and stools  6. Encourage ETOH abstinence

## 2019-09-27 NOTE — PROCEDURES
Central Line Insertion  Date/Time: 9/27/2019 1:50 AM  Performed by: Allan Courtney Jr., D.O.  Authorized by: Allan Courtney Jr., D.O.     Consent:     Consent obtained:  Written    Consent given by:  Patient    Alternatives discussed:  No treatment  Universal protocol:     Patient identity confirmed:  Arm band  Pre-procedure details:     Hand hygiene: Hand hygiene performed prior to insertion      Sterile barrier technique: All elements of maximal sterile technique followed      Skin preparation:  2% chlorhexidine    Skin preparation agent: Skin preparation agent completely dried prior to procedure    Sedation:     Sedation type:  Anxiolysis  Anesthesia:     Anesthesia method:  Local infiltration    Local anesthetic:  Lidocaine 1% w/o epi  Procedure details:     Location:  R internal jugular    Site selection rationale:  US guidance    Patient position:  Flat    Procedural supplies:  Triple lumen    Catheter size:  7 Fr    Landmarks identified: yes      Ultrasound guidance: yes      Sterile ultrasound techniques: Sterile gel and sterile probe covers were used      Number of attempts:  1    Successful placement: yes    Post-procedure details:     Post-procedure:  Dressing applied and line sutured    Assessment:  Blood return through all ports, no pneumothorax on x-ray, placement verified by x-ray and free fluid flow    Patient tolerance of procedure:  Tolerated well, no immediate complications

## 2019-09-27 NOTE — CARE PLAN
Problem: Communication  Goal: The ability to communicate needs accurately and effectively will improve  Outcome: PROGRESSING AS EXPECTED     Problem: Safety  Goal: Will remain free from falls  Outcome: PROGRESSING AS EXPECTED     Problem: Infection  Goal: Will remain free from infection  Outcome: PROGRESSING AS EXPECTED     Problem: Venous Thromboembolism (VTW)/Deep Vein Thrombosis (DVT) Prevention:  Goal: Patient will participate in Venous Thrombosis (VTE)/Deep Vein Thrombosis (DVT)Prevention Measures  Outcome: PROGRESSING AS EXPECTED     Problem: Bowel/Gastric:  Goal: Normal bowel function is maintained or improved  Outcome: PROGRESSING SLOWER THAN EXPECTED  Goal: Will not experience complications related to bowel motility  Outcome: PROGRESSING SLOWER THAN EXPECTED     Problem: Knowledge Deficit  Goal: Knowledge of disease process/condition, treatment plan, diagnostic tests, and medications will improve  Outcome: PROGRESSING SLOWER THAN EXPECTED  Goal: Knowledge of the prescribed therapeutic regimen will improve  Outcome: PROGRESSING SLOWER THAN EXPECTED     Problem: Discharge Barriers/Planning  Goal: Patient's continuum of care needs will be met  Outcome: PROGRESSING SLOWER THAN EXPECTED     Problem: Psychosocial Needs:  Goal: Level of anxiety will decrease  Outcome: PROGRESSING SLOWER THAN EXPECTED

## 2019-09-27 NOTE — CARE PLAN
Problem: Communication  Goal: The ability to communicate needs accurately and effectively will improve  Outcome: PROGRESSING AS EXPECTED     Problem: Safety  Goal: Will remain free from falls  Outcome: PROGRESSING AS EXPECTED     Problem: Infection  Goal: Will remain free from infection  Outcome: PROGRESSING AS EXPECTED     Problem: Venous Thromboembolism (VTW)/Deep Vein Thrombosis (DVT) Prevention:  Goal: Patient will participate in Venous Thrombosis (VTE)/Deep Vein Thrombosis (DVT)Prevention Measures  Outcome: PROGRESSING AS EXPECTED     Problem: Pain Management:  Goal: Pain level will decrease to patient's comfort goal  Outcome: PROGRESSING AS EXPECTED     Problem: Fluid Volume:  Goal: Maintain a balanced intake and output  Outcome: PROGRESSING AS EXPECTED     Problem: Physical Regulation:  Goal: Complications related to the disease process, condition or treatment will be avoided or minimized  Outcome: PROGRESSING AS EXPECTED     Problem: Bowel/Gastric:  Goal: Normal bowel function is maintained or improved  Outcome: PROGRESSING SLOWER THAN EXPECTED  Goal: Will not experience complications related to bowel motility  Outcome: PROGRESSING SLOWER THAN EXPECTED     Problem: Knowledge Deficit  Goal: Knowledge of disease process/condition, treatment plan, diagnostic tests, and medications will improve  Outcome: PROGRESSING SLOWER THAN EXPECTED  Goal: Knowledge of the prescribed therapeutic regimen will improve  Outcome: PROGRESSING SLOWER THAN EXPECTED     Problem: Discharge Barriers/Planning  Goal: Patient's continuum of care needs will be met  Outcome: PROGRESSING SLOWER THAN EXPECTED     Problem: Psychosocial Needs:  Goal: Level of anxiety will decrease  Outcome: PROGRESSING SLOWER THAN EXPECTED     Problem: Fluid Volume:  Goal: Will show no signs and symptoms of excessive bleeding  Outcome: PROGRESSING SLOWER THAN EXPECTED     Problem: Bowel/Gastric:  Goal: Will show no signs and symptoms of gastrointestinal  bleeding  Outcome: PROGRESSING SLOWER THAN EXPECTED     Problem: Physical Regulation:  Goal: Diagnostic test results will improve  Outcome: PROGRESSING SLOWER THAN EXPECTED     Problem: Knowledge Deficit:  Goal: Ability to identify signs and symptoms of gastrointestinal bleeding will improve  Outcome: PROGRESSING SLOWER THAN EXPECTED

## 2019-09-27 NOTE — PROGRESS NOTES
Dr. Courtney at bedside, central line procedure explained to patient and informed consent obtained.

## 2019-09-27 NOTE — H&P
Hospital Medicine History & Physical Note    Date of Service  9/26/2019    Primary Care Physician  No primary care provider on file.    Consultants  Dr. Courtney, pulmonary medicine  Dr. Ventura, GI    Code Status  Full    Chief Complaint  Chief Complaint   Patient presents with   • Lower GI Bleed   • Upper GI Bleed       History of Presenting Illness  44 y.o. male who presented on 9/26/2019 with bloody vomit and stools.  This is a 44-year-old gentleman who carries a history of alcoholic cirrhosis of the liver.  He reports that his symptoms began yesterday when he developed nausea with vomiting of both bloody and dark material.  He then noted that he had both dark sticky stool as well as bright red blood during a bowel movement.  His last EGD was in April which showed esophageal varices and he is status post banding.  While the patient states that he has been able to maintain sobriety for 4 years, he did report that he just started drinking again 3 months ago.  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  He does however drink several shots of alcohol on weekends and his last drink was on Sunday.  He otherwise denies any fevers, chills, headache, chest pain or shortness of breath.  He does report lightheadedness and dizziness.  Of note, the patient states that he was seen in [facility] last Thursday and that he had been prescribed 800 mg NSAID.  He did take 1 dose but then did not continue as he has been instructed multiple times in the past to avoid NSAIDs.    Review of Systems  Review of Systems   Constitutional: Negative for chills and fever.   HENT: Negative for congestion and sore throat.    Eyes: Negative for photophobia.   Respiratory: Negative for cough, shortness of breath and wheezing.    Cardiovascular: Negative for chest pain and palpitations.   Gastrointestinal: Positive for abdominal pain, blood in stool, melena, nausea and vomiting.  Negative for diarrhea.   Genitourinary: Negative for dysuria.   Musculoskeletal: Negative for myalgias.   Skin: Negative.    Neurological: Negative for dizziness, tingling, focal weakness and headaches.   Psychiatric/Behavioral: Negative for depression and suicidal ideas.       Past Medical History  Past Medical History:   Diagnosis Date   • Anemia    • ETOH abuse    • GI bleed    • Liver disease    • Pancreatitis        Surgical History  Past Surgical History:   Procedure Laterality Date   • GASTROSCOPY-ENDO N/A 2019    Procedure: GASTROSCOPY with Banding;  Surgeon: Jose Andrade D.O.;  Location: SURGERY [facility];  Service: Gastroenterology   • HERNIA REPAIR     • OTHER      right leg fx repair       Family History  No history of early MIs    Social History  Social History     Tobacco Use   • Smoking status: Never Smoker   • Smokeless tobacco: Never Used   Substance Use Topics   • Alcohol use: No     Comment: former   • Drug use: No   Current alcohol use    Allergies  No Known Allergies    Medications  No current facility-administered medications on file prior to encounter.      No current outpatient medications on file prior to encounter.       Physical Exam  Hemodynamics  Temp (24hrs), Av.1 °C (98.8 °F), Min:37.1 °C (98.8 °F), Max:37.1 °C (98.8 °F)   Temperature: 37.1 °C (98.8 °F)  Pulse  Av.5  Min: 127  Max: 128    Blood Pressure: 131/61      Respiratory      Respiration: 20, Pulse Oximetry: 100 %             Physical Exam   Constitutional: He is oriented to person, place, and time. No distress.   HENT:   Head: Normocephalic and atraumatic.   Right Ear: External ear normal.   Left Ear: External ear normal.   Eyes: EOM are normal. Right eye exhibits no discharge. Left eye exhibits no discharge.   Neck: Neck supple. No JVD present.   Cardiovascular: Regular rhythm and normal heart sounds.   Tachycardia   Pulmonary/Chest: Effort normal and breath sounds normal. No respiratory distress. He  exhibits no tenderness.   Abdominal: Soft. Bowel sounds are normal. He exhibits no distension. There is no tenderness.   Musculoskeletal: He exhibits no edema.   Neurological: He is alert and oriented to person, place, and time. No cranial nerve deficit.   Skin: Skin is dry. He is not diaphoretic. No erythema.   Psychiatric: He has a normal mood and affect. His behavior is normal.   Nursing note and vitals reviewed.    Capillary refill less than 3 seconds, distal pulses intact    Laboratory:  Recent Labs     09/26/19 1904   WBC 11.6*   RBC 2.33*   HEMOGLOBIN 5.2*   HEMATOCRIT 17.6*   MCV 75.5*   MCH 22.3*   MCHC 29.5*   RDW 46.4   PLATELETCT 194   MPV 11.0     Recent Labs     09/26/19 1904   SODIUM 136   POTASSIUM 6.0*   CHLORIDE 111   CO2 15*   GLUCOSE 165*   BUN 23*   CREATININE 0.58   CALCIUM 7.0*     Recent Labs     09/26/19 1904   ALTSGPT 91*   ASTSGOT 170*   ALKPHOSPHAT 45   TBILIRUBIN 1.0   LIPASE 66   GLUCOSE 165*     Recent Labs     09/26/19 1904   APTT 34.0   INR 1.82*             No results found for: TROPONINI    Imaging  Dx-chest-portable (1 View)    Result Date: 9/26/2019 9/26/2019 7:41 PM HISTORY/REASON FOR EXAM:  Chest Pain TECHNIQUE/EXAM DESCRIPTION AND NUMBER OF VIEWS: Single portable view of the chest. COMPARISON: None FINDINGS: The heart, mediastinum, and natanael are unremarkable. The lungs are well expanded and show no evidence of infiltrate or other acute process. There is no obvious pleural effusion. The bony thorax is grossly normal.     No acute cardiopulmonary abnormality.        Assessment/Plan:  Anticipate that patient will need greater than 2 midnights for management of the discussed medical issues.    * GIB (gastrointestinal bleeding)  Assessment & Plan  Patient with history of alcoholic cirrhosis of the liver and reportedly has had scopes in the past which noted varices.  He has had no active vomiting since his arrival to the emergency room.  He is quite anemic with a hemoglobin of 6  and is tachycardic but otherwise his blood pressure stable.  Given the potential for brisk upper GI bleed, he will be omitted to the ICU for continuous hemodynamic monitoring.  We will continue to trend hemoglobin levels and plan to transfuse to maintain greater than 7 hemoglobin.  He will be on gentle IV fluids for volume expansion, I have started him on both an IV Protonix drip as well as octreotide.  The patient will be kept n.p.o. and Dr. Wagner GI is following.  The patient will be scheduled for endoscopy in the morning.    Anemia requiring transfusions  Assessment & Plan  Treatment as noted above.    Alcohol abuse- (present on admission)  Assessment & Plan  Patient reports 4 years of sobriety before falling off the wagon and drinking for the last 3 months.  Agree with initiation of alcohol withdrawal monitoring.      Prophylaxis: Sequential compression device for DVT prophylaxis, PPI indicated, bowel protocol as needed

## 2019-09-27 NOTE — PROGRESS NOTES
Critical Care Progress Note    Date of admission  9/26/2019    Chief Complaint  44 y.o. male admitted 9/26/2019 with an Acute Upper GI Bleed    Hospital Course    44 y.o. male with a past medical history of alcohol abuse, cirrhosis and portal hypertension, variceal hemorrhage who presented 9/26/2019 with upper GI bleeding.  Patient states yesterday he began to have dark bloody bowel movements then shortly afterwards began vomiting bright red blood.  The patient reports he had a similar episode in April in which varices were found and banded.  Patient states he was sober for quite some time however began drinking approximately 3 months ago and his last drink was on Sunday.  The patient states that with this GI bleeding he has had dizziness.  He denies any fevers, chills, abdominal pain.  Labs in the ER include a hemoglobin of 5.2, WBC 11.6, platelet count 194, potassium 6, CO2 15, glucose 165, BUN 23, calcium 7, , ALT 91, albumin 2.8, INR 1.82 serum alcohol negative.  GI was consulted in the emergency department with a plan for EGD in the morning.  The patient is to be admitted to the ICU for further care of his upper GI bleeding.     The patient was referred to [facility] liver transplant service in 2015 however at that time was not abstinent and liver disease was too early for consideration of transplant         Interval Problem Update  Reviewed last 24 hour events:  - 4 units 1 plt   - Neuro: GCS 15   - HR: 100-120s   - MAPs : 70-90s   - GI: NPO, PPI gtt   - UOP: 500cc   - Humphrey: none   - Tm: 37.9   - Lines: R IJ CVC   - PPx: GI PPI, DVT hold GI bleed       Review of Systems  Review of Systems   Constitutional: Negative for chills and fever.   HENT: Negative for nosebleeds and sore throat.    Eyes: Negative for blurred vision.   Respiratory: Negative for hemoptysis.    Cardiovascular: Negative for chest pain and palpitations.   Gastrointestinal: Positive for abdominal pain, melena and vomiting.  Negative for nausea.   Genitourinary: Positive for hematuria.   Musculoskeletal: Negative for falls.   Skin: Negative for rash.   Neurological: Positive for weakness. Negative for dizziness and headaches.   Endo/Heme/Allergies: Does not bruise/bleed easily.   Psychiatric/Behavioral: Positive for depression.        Vital Signs for last 24 hours   Temp:  [36.8 °C (98.2 °F)-37.9 °C (100.2 °F)] 37.9 °C (100.2 °F)  Pulse:  [102-131] 106  Resp:  [0-63] 63  BP: (114-149)/(44-70) 135/69  SpO2:  [95 %-100 %] 95 %    Hemodynamic parameters for last 24 hours       Respiratory Information for the last 24 hours       Physical Exam   Physical Exam   Constitutional: He is oriented to person, place, and time. He appears well-nourished.   HENT:   Head: Normocephalic and atraumatic.   Eyes: Pupils are equal, round, and reactive to light. Conjunctivae and EOM are normal.   Cardiovascular: Normal rate, regular rhythm and intact distal pulses.   Pulmonary/Chest: Effort normal and breath sounds normal. No stridor. No respiratory distress.   Abdominal: Soft. Bowel sounds are normal.   Musculoskeletal: He exhibits no edema.   Neurological: He is alert and oriented to person, place, and time. No cranial nerve deficit.   GCS 15   Skin: Skin is warm and dry.   Pale oral mucosa   Psychiatric: He has a normal mood and affect.       Medications  Current Facility-Administered Medications   Medication Dose Route Frequency Provider Last Rate Last Dose   • senna-docusate (PERICOLACE or SENOKOT S) 8.6-50 MG per tablet 2 Tab  2 Tab Oral BID Marisa Cerna M.D.        And   • polyethylene glycol/lytes (MIRALAX) PACKET 1 Packet  1 Packet Oral QDAY PRN Marisa Cerna M.D.        And   • magnesium hydroxide (MILK OF MAGNESIA) suspension 30 mL  30 mL Oral QDAY PRN Marisa Cerna M.D.        And   • bisacodyl (DULCOLAX) suppository 10 mg  10 mg Rectal QDAY PRN Marisa Cerna M.D.       • lactated ringers infusion   Intravenous Continuous Marisa Cerna  M.D. 75 mL/hr at 09/26/19 2206     • ondansetron (ZOFRAN) syringe/vial injection 4 mg  4 mg Intravenous Q4HRS PRN Marisa Cerna M.D.   4 mg at 09/26/19 2214   • ondansetron (ZOFRAN ODT) dispertab 4 mg  4 mg Oral Q4HRS PRN Marisa Cerna M.D.       • promethazine (PHENERGAN) tablet 12.5-25 mg  12.5-25 mg Oral Q4HRS PRN Marisa Cerna M.D.       • promethazine (PHENERGAN) suppository 12.5-25 mg  12.5-25 mg Rectal Q4HRS PRN Marisa Cerna M.D.       • prochlorperazine (COMPAZINE) injection 5-10 mg  5-10 mg Intravenous Q4HRS PRN Marisa Cerna M.D.       • pantoprazole (PROTONIX) 80 mg in  mL Infusion  8 mg/hr Intravenous Continuous Marisa Cerna M.D. 25 mL/hr at 09/26/19 2205 8 mg/hr at 09/26/19 2205   • octreotide (SANDOSTATIN) 1,250 mcg in  mL Infusion  50 mcg/hr Intravenous Continuous Marisa Cerna M.D. 10 mL/hr at 09/26/19 2205 50 mcg/hr at 09/26/19 2205   • cefTRIAXone (ROCEPHIN) 2 g in  mL IVPB  2 g Intravenous QHS Allan Courtney Jr., D.O.   Stopped at 09/26/19 2238   • LORazepam (ATIVAN) injection 4 mg  4 mg Intravenous Q15 MIN PRN Allan Courtney Jr., D.O.        Or   • LORazepam (ATIVAN) injection 3 mg  3 mg Intravenous Q15 MIN PRN Allan Courtney Jr., D.O.        Or   • LORazepam (ATIVAN) injection 2 mg  2 mg Intravenous Q15 MIN PRN Allan Courtney Jr., D.O.   2 mg at 09/27/19 0128    Or   • LORazepam (ATIVAN) injection 1 mg  1 mg Intravenous Q15 MIN PRN Allan Courtney Jr., D.O.   1 mg at 09/26/19 2302   • thiamine tablet 100 mg  100 mg Oral DAILY LORY Rubio Jr..O.   Stopped at 09/27/19 0600    And   • multivitamin (THERAGRAN) tablet 1 Tab  1 Tab Oral DAILY LORY Rubio Jr..O.   Stopped at 09/27/19 0600    And   • folic acid (FOLVITE) tablet 1 mg  1 mg Oral DAILY LORY Rubio Jr..O.   Stopped at 09/27/19 0600       Fluids    Intake/Output Summary (Last 24 hours) at 9/27/2019 0713  Last data filed at 9/27/2019 0645  Gross per 24 hour   Intake 2777.51 ml    Output --   Net 2777.51 ml       Laboratory          Recent Labs     09/26/19 1904 09/27/19  0315   SODIUM 136 141   POTASSIUM 6.0* 4.0   CHLORIDE 111 114*   CO2 15* 21   BUN 23* 26*   CREATININE 0.58 0.68   MAGNESIUM  --  1.2*   PHOSPHORUS  --  2.5   CALCIUM 7.0* 6.6*     Recent Labs     09/26/19 1904 09/27/19  0315   ALTSGPT 91*  --    ASTSGOT 170*  --    ALKPHOSPHAT 45  --    TBILIRUBIN 1.0  --    LIPASE 66  --    GLUCOSE 165* 140*     Recent Labs     09/26/19 1904 09/27/19  0315   WBC 11.6* 6.7   NEUTSPOLYS 86.30*  --    LYMPHOCYTES 8.90*  --    MONOCYTES 4.20  --    EOSINOPHILS 0.00  --    BASOPHILS 0.20  --    ASTSGOT 170*  --    ALTSGPT 91*  --    ALKPHOSPHAT 45  --    TBILIRUBIN 1.0  --      Recent Labs     09/26/19 1904 09/27/19 0315   RBC 2.33* 2.28*   HEMOGLOBIN 5.2* 5.4*   HEMATOCRIT 17.6* 17.2*   PLATELETCT 194 77*   PROTHROMBTM 21.6*  --    APTT 34.0  --    INR 1.82*  --        Imaging  X-Ray:  I have personally reviewed the images and compared with prior images.  EKG:  I have personally reviewed the images and compared with prior images.    Assessment/Plan  * Acute GI bleeding- (present on admission)  Assessment & Plan  EGD this am and no signs of active hemorrhage. 3 esophageal varices banded  Continue octreotide, PPI, Rocephin  GI following  Closely monitor hemodynamics, Serial H&H, transfuse for hemoglobin less than 7  Has required 4 units PRBCs and 1 plt  MELD 14: 6% 3 month mortality  Maddrey 40.6, holding steroids for GI bleeding      Alcoholic liver disease (HCC)- (present on admission)  Assessment & Plan  US shows cirrhotic liver changes    Hypomagnesemia- (present on admission)  Assessment & Plan  Replete and follow    Hypocalcemia  Assessment & Plan  Replete and follow     Anemia requiring transfusions- (present on admission)  Assessment & Plan  Blood loss anemia  Treatment as noted under acute GI bleeding    Alcohol abuse- (present on admission)  Assessment & Plan  Continued alcohol  abuse with last drink on 9/22/19  Monitor for signs of withdrawal  CIWA  Vitamin supplementation         VTE:  Contraindicated  Ulcer: PPI  Lines: Central Line  Ongoing indication addressed    I have performed a physical exam and reviewed and updated ROS and Plan today (9/27/2019). In review of yesterday's note (9/26/2019), there are no changes except as documented above.     Discussed patient condition and risk of morbidity and/or mortality with Hospitalist, RN, RT, Pharmacy, Code status disscussed, Charge nurse / hot rounds, Patient and GI  The patient remains critically ill.  Critical care time = 35 minutes in directly providing and coordinating critical care and extensive data review.  No time overlap and excludes procedures.

## 2019-09-27 NOTE — ED NOTES
Medication Reconciliation updated and complete per pt at bedside  Allergies have been verified   No oral ABX within the last 14 days  Pt Home Pharmacy:[facility]

## 2019-09-27 NOTE — CONSULTS
Date of Service:9/26/2019    Consult Requested By: Patricia Delgado M.D.    Reason for Consultation: hematemesis and melena    History of Present Illness:   Jimbo Santacruz is a 44 y.o. presents this evening because of having several bouts of hematemesis and melena over the course of yesterday.  He has some symptoms of nausea and vomiting and had 3 episodes of emesis consistent with coffee grounds.  He underwent an upper endoscopy in April of this year that demonstrated 3 columns of esophageal varices that were banded at that time and he had does have some underlying portal gastropathy.  He has been an active drinker in the past but denies any present alcohol except for Sunday where he decide to have some alcohol.  He works at the [employer] and subsequently has a past history of alcohol abuse.  At the present time he denies any type of dysphagia odynophagia abdominal pain.  His last bowel movement was earlier this morning that was consistent with melena.  He said no further bowel movement since being here.  He is lightheaded dizzy and slightly tachycardic at the present time.    Review Of Systems:  HEENT:  No ear pain, congestion, or sore throat   EYES: no discharge, redness, or vision changes  CARDIAC: no chest pain, no palpitations    PULMONARY: no dyspnea, cough, or congestion   GI: Blood in vomit. Blood in stool. no diarrhea, or abdominal pain   : no dysuria, back pain, or hematuria   Neuro: no weakness, numbness, aphasia, or headache  Musculoskeletal: no swelling, deformity, pain, or joint swelling  Endocrine: no fevers, sweating, or weight loss   SKIN: no rash, erythema, or contusions      See history of present illness. All other systems are negative.     PMH:   Past Medical History:   Diagnosis Date   • Anemia    • ETOH abuse    • GI bleed    • Liver disease    • Pancreatitis          PSH:  Past Surgical History:   Procedure Laterality Date   • GASTROSCOPY-ENDO N/A 4/13/2019    Procedure: GASTROSCOPY with  Banding;  Surgeon: Jose Andrade D.O.;  Location: SURGERY [facility];  Service: Gastroenterology   • HERNIA REPAIR     • OTHER      right leg fx repair       FAMILY HX:  No family history on file.    SOCIAL HX:  Social History     Socioeconomic History   • Marital status:      Spouse name: Not on file   • Number of children: Not on file   • Years of education: Not on file   • Highest education level: Not on file   Occupational History   • Not on file   Social Needs   • Financial resource strain: Not on file   • Food insecurity:     Worry: Not on file     Inability: Not on file   • Transportation needs:     Medical: Not on file     Non-medical: Not on file   Tobacco Use   • Smoking status: Never Smoker   • Smokeless tobacco: Never Used   Substance and Sexual Activity   • Alcohol use: No     Comment: former   • Drug use: No   • Sexual activity: Not on file   Lifestyle   • Physical activity:     Days per week: Not on file     Minutes per session: Not on file   • Stress: Not on file   Relationships   • Social connections:     Talks on phone: Not on file     Gets together: Not on file     Attends [employer] service: Not on file     Active member of club or organization: Not on file     Attends meetings of clubs or organizations: Not on file     Relationship status: Not on file   • Intimate partner violence:     Fear of current or ex partner: Not on file     Emotionally abused: Not on file     Physically abused: Not on file     Forced sexual activity: Not on file   Other Topics Concern   • Not on file   Social History Narrative   • Not on file     Social History     Tobacco Use   Smoking Status Never Smoker   Smokeless Tobacco Never Used     Social History     Substance and Sexual Activity   Alcohol Use No    Comment: former       Allergies/Intolerances:  No Known Allergies    History reviewed with the patient    Other Current Medications:    Current Facility-Administered Medications:   •  octreotide  "(SANDOSTATIN) injection 50 mcg, 50 mcg, Intravenous, Once, Patricia Delgado M.D.  No current outpatient medications on file.  [unfilled]    Most Recent Vital Signs:  /61   Pulse (!) 127   Temp 37.1 °C (98.8 °F) (Oral)   Resp 20   Ht 1.778 m (5' 10\")   Wt 103.9 kg (229 lb)   SpO2 100%   BMI 32.86 kg/m²   Temp  Av.1 °C (98.8 °F)  Min: 37.1 °C (98.8 °F)  Max: 37.1 °C (98.8 °F)    Physical Exam:  General: Nontoxic, no acute distress  HEENT: sclera anicteric, PERRL, EOMI, MMM, no oral lesions  Neck: supple, no lymphadenopathy  Chest: CTAB, no r/r/w, normal work of breathing.  Cardiac: Regular, no murmurs no gallops heard  Abdomen: + bowel sounds, soft, non-tender, non-distended, no HSM  Extremities: No edema. No joint swelling.  Skin: no rashes or erythema  Neuro: Alert and oriented times 3, non-focal exam    Pertinent Lab Results:  Recent Labs     19   WBC 11.6*      Recent Labs     19   HEMOGLOBIN 5.2*   HEMATOCRIT 17.6*   MCV 75.5*   MCH 22.3*   ANISOCYTOSIS 2+   PLATELETCT 194         Recent Labs     19   SODIUM 136   POTASSIUM 6.0*   CHLORIDE 111   CO2 15*   CREATININE 0.58        Recent Labs     19   ALBUMIN 2.8*      Recent Labs     19  190   ASTSGOT 170*   ALTSGPT 91*   TBILIRUBIN 1.0   ALKPHOSPHAT 45   GLOBULIN 2.7   INR 1.82*       [unfilled]      Pertinent Micro:  Results     ** No results found for the last 168 hours. **        No results found for: BLOODCULTU, BLDCULT, BCHOLD     Studies:                                                                                            IMPRESSION:     Hematemesis  Melena  Anemia  Alcohol abuse  Cirrhosis  Portal gastropathy  History of esophageal varices  Coagulopathy  Elevated liver enzymes        PLAN:     At the present time would recommend admitting to the hospital, the best place perhaps would be the ICU for close monitoring.  Recommend 2 large IV bore needles monitor his H&H " every 6 hours and keep greater than 721 with blood transfusions.  Start IV hydration, start him on IV octreotide try to help reduce portal pressures.  Would correct for any type of underlying coagulopathy with fresh frozen plasma, presently his INR is 1.8 he would benefit perhaps 1 to 2 units of fresh frozen plasma.  Abdominal ultrasound along with an alpha-fetoprotein would be advisable.  His last upper endoscopy was approximately 5 months ago and esophageal varices were appreciated at that time.  He is followed by a transplant hepatologist Dr. Chahal in the outpatient setting.  With consented for an EGD with the plans when he is hemodynamically stable to scope him with an upper endoscopy.  IV Protonix should also be initiated and some IV antibiotics to prevent his history of cirrhosis in the past history of esophageal varices.  If there are any further questions or concerns please feel free to give us a call at [phone]..      Discussed with IM. Will continue to follow    Shukri Ventura M.D.

## 2019-09-27 NOTE — ED PROVIDER NOTES
"ED Provider Note    Scribed for Patricia Delgado M.D. by Chidi Balbuena. 9/26/2019  7:02 PM    Primary care provider: None  Means of arrival: [facility]  History obtained from: Patient  History limited by: None    CHIEF COMPLAINT  Chief Complaint   Patient presents with   • Lower GI Bleed   • Upper GI Bleed       HPI  Jimbo Santacruz is a 44 y.o. male who presents to the Emergency Department for evaluation of blood in his stool onset this morning. The patient describes his stool as \"black\" in color. The has had symptoms of nausea and vomiting, noting 3 episodes of blood in his vomit. He states the blood in his vomit is bright red. His symptoms have been intermittent. Denies alleviating factors. The patient denies any abdominal pain. The patient has been taking daily ibuprofen to manage pain from a prior back injury The patient has a medical history of liver cirrhosis. The patient affirms a history of alcohol abuse but states he quit daily drinking three years ago. The patient denies drinking last night but states he started drinking again several days ago.     REVIEW OF SYSTEMS  HEENT:  No ear pain, congestion, or sore throat   EYES: no discharge, redness, or vision changes  CARDIAC: no chest pain, no palpitations    PULMONARY: no dyspnea, cough, or congestion   GI: Blood in vomit. Blood in stool. no diarrhea, or abdominal pain   : no dysuria, back pain, or hematuria   Neuro: no weakness, numbness, aphasia, or headache  Musculoskeletal: no swelling, deformity, pain, or joint swelling  Endocrine: no fevers, sweating, or weight loss   SKIN: no rash, erythema, or contusions     See history of present illness. All other systems are negative. C.    PAST MEDICAL HISTORY   has a past medical history of Anemia, ETOH abuse, GI bleed, Liver disease, and Pancreatitis.    SURGICAL HISTORY   has a past surgical history that includes hernia repair; other; and gastroscopy-endo (N/A, 4/13/2019).    SOCIAL HISTORY  Social History " "    Tobacco Use   • Smoking status: Never Smoker   • Smokeless tobacco: Never Used   Substance Use Topics   • Alcohol use: No     Comment: former   • Drug use: No      Social History     Substance and Sexual Activity   Drug Use No       FAMILY HISTORY  None    CURRENT MEDICATIONS  Denies.     ALLERGIES  No Known Allergies    PHYSICAL EXAM  VITAL SIGNS: /70   Pulse (!) 128   Resp (!) 25   Ht 1.778 m (5' 10\")   Wt 103.9 kg (229 lb)   BMI 32.86 kg/m²     Constitutional: Well developed, Well nourished, pale Mild distress. Ill appearing  HEENT: Normocephalic, Atraumatic,  external ears normal, pharynx pink,  Mucous  Membranes dry, No rhinorrhea or mucosal edema  Eyes: PERRL, EOMI, Conjunctiva pale, No discharge.   Neck: Normal range of motion, No tenderness, Supple, No stridor.   Lymphatic: No lymphadenopathy    Cardiovascular: Tachycardic. Regular Rhythm, No murmurs,  rubs, or gallops.   Thorax & Lungs: Lungs clear to auscultation bilaterally, No respiratory distress, No wheezes, rhales or rhonchi, No chest wall tenderness.   Abdomen: Bowel sounds normal, Soft, non tender, non distended,  No pulsatile masses., no rebound guarding or peritoneal signs.   Skin: Warm, Dry, No erythema, No rash, pale   Back:  No CVA tenderness,  No spinal tenderness, bony crepitance, step offs, or instability.   Neurologic: Alert & oriented x 3, Normal motor function, Normal sensory function, No focal deficits noted. Normal reflexes. Normal Cranial Nerves.  Extremities: Trace 1+ bilateral lower extremity edema. Equal, intact distal pulses, No cyanosis, no clubbing,  No tenderness.   Musculoskeletal: Good range of motion in all major joints. No tenderness to palpation or major deformities noted.       DIAGNOSTIC STUDIES / PROCEDURES    LABS  Results for orders placed or performed during the hospital encounter of 09/26/19   CBC WITH DIFFERENTIAL   Result Value Ref Range    WBC 11.6 (H) 4.8 - 10.8 K/uL    RBC 2.33 (L) 4.70 - 6.10 " M/uL    Hemoglobin 5.2 (LL) 14.0 - 18.0 g/dL    Hematocrit 17.6 (LL) 42.0 - 52.0 %    MCV 75.5 (L) 81.4 - 97.8 fL    MCH 22.3 (L) 27.0 - 33.0 pg    MCHC 29.5 (L) 33.7 - 35.3 g/dL    RDW 46.4 35.9 - 50.0 fL    Platelet Count 194 164 - 446 K/uL    MPV 11.0 9.0 - 12.9 fL    Neutrophils-Polys 86.30 (H) 44.00 - 72.00 %    Lymphocytes 8.90 (L) 22.00 - 41.00 %    Monocytes 4.20 0.00 - 13.40 %    Eosinophils 0.00 0.00 - 6.90 %    Basophils 0.20 0.00 - 1.80 %    Immature Granulocytes 0.40 0.00 - 0.90 %    Nucleated RBC 0.00 /100 WBC    Neutrophils (Absolute) 9.97 (H) 1.82 - 7.42 K/uL    Lymphs (Absolute) 1.03 1.00 - 4.80 K/uL    Monos (Absolute) 0.48 0.00 - 0.85 K/uL    Eos (Absolute) 0.00 0.00 - 0.51 K/uL    Baso (Absolute) 0.02 0.00 - 0.12 K/uL    Immature Granulocytes (abs) 0.05 0.00 - 0.11 K/uL    NRBC (Absolute) 0.00 K/uL    Hypochromia 1+     Anisocytosis 2+     Microcytosis 2+    COMP METABOLIC PANEL   Result Value Ref Range    Sodium 136 135 - 145 mmol/L    Potassium 6.0 (H) 3.6 - 5.5 mmol/L    Chloride 111 96 - 112 mmol/L    Co2 15 (L) 20 - 33 mmol/L    Anion Gap 10.0 0.0 - 11.9    Glucose 165 (H) 65 - 99 mg/dL    Bun 23 (H) 8 - 22 mg/dL    Creatinine 0.58 0.50 - 1.40 mg/dL    Calcium 7.0 (L) 8.5 - 10.5 mg/dL    AST(SGOT) 170 (H) 12 - 45 U/L    ALT(SGPT) 91 (H) 2 - 50 U/L    Alkaline Phosphatase 45 30 - 99 U/L    Total Bilirubin 1.0 0.1 - 1.5 mg/dL    Albumin 2.8 (L) 3.2 - 4.9 g/dL    Total Protein 5.5 (L) 6.0 - 8.2 g/dL    Globulin 2.7 1.9 - 3.5 g/dL    A-G Ratio 1.0 g/dL   LIPASE   Result Value Ref Range    Lipase 66 11 - 82 U/L   PROTHROMBIN TIME (INR)   Result Value Ref Range    PT 21.6 (H) 12.0 - 14.6 sec    INR 1.82 (H) 0.87 - 1.13   APTT   Result Value Ref Range    APTT 34.0 24.7 - 36.0 sec   COD (ADULT)   Result Value Ref Range    ABO Grouping Only A     Rh Grouping Only POS     Antibody Screen-Cod NEG     Component R       R4                  Red Blood Cells     [id number]   issued       09/26/19    20:21      Product Type Red Blood Cells LR Pheresis     Dispense Status issued     Unit Number (Barcoded) [id number]     Product Code (Barcoded) W4054D98     Blood Type (Barcoded) 6200    DIAGNOSTIC ALCOHOL   Result Value Ref Range    Diagnostic Alcohol 0.00 0.00 g/dL   ESTIMATED GFR   Result Value Ref Range    GFR If African American >60 >60 mL/min/1.73 m 2    GFR If Non African American >60 >60 mL/min/1.73 m 2   PERIPHERAL SMEAR REVIEW   Result Value Ref Range    Peripheral Smear Review see below    PLATELET ESTIMATE   Result Value Ref Range    Plt Estimation Normal    MORPHOLOGY   Result Value Ref Range    RBC Morphology Present     Polychromia 1+     Poikilocytosis 1+     Ovalocytes 1+     Echinocytes 1+    DIFFERENTIAL COMMENT   Result Value Ref Range    Comments-Diff see below    EKG (NOW)   Result Value Ref Range    Report       [facility] Emergency Dept.    Test Date:  2019  Pt Name:    JHON TURNER              Department: ER  MRN:        [id number]                      Room:       [facility]  Gender:     Male                         Technician: [id number]  :        1975                   Requested By:RUBY FRANK  Order #:    [id number]                    Reading MD: RUBY FRANK MD    Measurements  Intervals                                Axis  Rate:       130                          P:          28  MS:         128                          QRS:        34  QRSD:       78                           T:          26  QT:         332  QTc:        488    Interpretive Statements  SINUS TACHYCARDIA  EARLY PRECORDIAL R/S TRANSITION  BORDERLINE PROLONGED QT INTERVAL  No previous ECG available for comparison    Electronically Signed On 2019 19:57:32 PDT by RUBY FRANK MD       All labs reviewed by me. 12 lead EKG; Interpreted by emergency department physician    RADIOLOGY  DX-CHEST-PORTABLE (1 VIEW)   Final Result      No acute cardiopulmonary abnormality.      US-ABDOMEN  COMPLETE SURVEY    (Results Pending)     The radiologist's interpretation of all radiological studies have been reviewed by me.    COURSE & MEDICAL DECISION MAKING  Nursing notes, VS, PMSFHx reviewed in chart.    7:02 PM Patient seen and examined at bedside. I informed the patient of my plan to run diagnostic studies to evaluate their symptoms including chest X-ray and labs. Patient verbalizes understanding and support with my plan of care. Patient will be treated with 1,000 mL normal saline, 50 mcg Sandostatin, and 80 mg Protonix. Intravenous fluids administered for tachycardia and clinical signs of dehydration. Ordered DX-Chest, CBC, CMP, Lipase, Prothrombin time, APTT, COD, Diagnostic alcohol and EKG to evaluate his symptoms. The differential diagnoses include but are not limited to: anemia, esophogeal varices, upper GI bleed, gastritis, Peptic ulcer disease, cardiac ischemia, liver cirrhosis.     I have explained to the patient the risks and benefits of transfusion of blood products.  This includes, as appropriate, the risk of mild allergic reaction, hemolytic reaction, transfusion-associated lung injury, febrile reactions, circulatory or iron overload, and infection.    We discussed possible alternatives and their risks, including directed donation, autologous transfusion, and no transfusion, including IV or oral iron supplementation, as appropriate.  I believe the patient understands the risks and benefits and was able to express understanding.    7:33 PM - Reviewed patient's lab results. Patient's hemoglobin is 5.2. Patient will be treated with second unit of blood.     7:34 PM - Paged GI.    7:41 PM I discussed the patient's case and the above findings with Dr. Enamorado (GI) who agrees to come see the patient.     7:54 PM - Paged hospitalist and intensivist Dr Courtney     8:00 PM - I discussed the patient's case and the above findings with Dr. Cerna (Hospitalist) who accepts the patient for  admission.    Critical Care Time  The total critical care time on this patient is 40 minutes, resuscitating patient, speaking with admitting physician, and deciphering test results. This 40 minutes is exclusive of separately billable procedures.    DISPOSITION:  Patient will be admitted to Dr Cerna in critical condition.    FINAL IMPRESSION  1. Upper GI bleed    2. Acute blood loss anemia    3. Hemorrhagic shock (HCC)       40 minutes Critical Care Time     Chidi DORAN (Scribe), am scribing for, and in the presence of, Patricia Delgado M.D..    Electronically signed by: Chidi Balbuena (Scribe), 9/26/2019    Patricia DORAN M.D. personally performed the services described in this documentation, as scribed by Chidi Balbuena in my presence, and it is both accurate and complete.    The note accurately reflects work and decisions made by me.  Patricia Delgado  9/26/2019  9:06 PM

## 2019-09-27 NOTE — ASSESSMENT & PLAN NOTE
GI consulting  9/27 Endoscopy:   1.  severe hemorrhagic portal gastropathy. This could have bled in the setting of NSAID use  2.  Large esophageal varices with red nisa signs. 3 ligation bands were placed    IV Protonix changed to oral  Monitor daily hemoglobin  Transfuse for hemoglobin less than 7  Wean IV fluids  Need ongoing encouragement to stop alcohol use  Status post platelets, FFP, vitamin K  9/27 INR: 1.81 monitor  9/28 INR: 1.58   Diagnostic testing not indicated for today's encounter

## 2019-09-27 NOTE — PROGRESS NOTES
[facility] Medicine Daily Progress Note    Date of Service  9/27/2019    Chief Complaint  Vomiting blood and black stool    Hospital Course    44 y.o. male with a hx of alcoholic cirrhosis and admitted 9/26/2019 with GI bleed      Interval Problem Update  Awake  LR 75cc/hr  S/P calcium IV replacment  On 1L NC  Speech clear      Consultants/Specialty  GI  Intensivist    Code Status  FULL    Disposition  Monitor in ICU    Review of Systems  Review of Systems   Constitutional: Positive for malaise/fatigue. Negative for fever.   HENT: Negative for sore throat.    Respiratory: Negative for cough, shortness of breath and stridor.    Cardiovascular: Positive for leg swelling. Negative for chest pain and palpitations.   Gastrointestinal: Positive for blood in stool, melena and nausea. Negative for abdominal pain and vomiting.   Genitourinary: Negative for dysuria and hematuria.   Musculoskeletal: Negative for back pain.   Neurological: Negative for dizziness.   Psychiatric/Behavioral: The patient is not nervous/anxious.         Physical Exam  Temp:  [36.5 °C (97.7 °F)-38.2 °C (100.8 °F)] 36.5 °C (97.7 °F)  Pulse:  [] 92  Resp:  [0-63] 21  BP: (114-149)/(44-83) 130/78  SpO2:  [95 %-100 %] 97 %    Physical Exam   Constitutional: He appears well-developed. No distress.   HENT:   Head: Normocephalic and atraumatic.   Nose: Nose normal.   Mouth/Throat: No oropharyngeal exudate.   Eyes: Conjunctivae and EOM are normal. Right eye exhibits no discharge. Left eye exhibits no discharge. No scleral icterus.   Neck: No tracheal deviation present.   Cardiovascular: Normal rate, regular rhythm and normal heart sounds.   No murmur heard.  Pulses:       Radial pulses are 2+ on the right side, and 2+ on the left side.        Dorsalis pedis pulses are 2+ on the right side, and 2+ on the left side.   Pulmonary/Chest: Effort normal and breath sounds normal. No stridor. No respiratory distress. He has no wheezes.   Abdominal: Soft. Bowel  sounds are normal. He exhibits distension. There is no tenderness.   Musculoskeletal: He exhibits edema.   Neurological: He is alert. No cranial nerve deficit.   Skin: He is not diaphoretic.   Psychiatric: He has a normal mood and affect. His behavior is normal.       Fluids    Intake/Output Summary (Last 24 hours) at 9/27/2019 1740  Last data filed at 9/27/2019 1415  Gross per 24 hour   Intake 7060.42 ml   Output 1100 ml   Net 5960.42 ml       Laboratory  Recent Labs     09/27/19  0315 09/27/19  0912 09/27/19  1608   WBC 6.7 5.4 4.5*   RBC 2.28* 2.64* 2.98*   HEMOGLOBIN 5.4* 6.6* 7.4*   HEMATOCRIT 17.2* 20.5* 23.7*   MCV 75.0* 77.7* 79.5*   MCH 23.7* 25.0* 24.8*   MCHC 31.6* 32.2* 31.2*   RDW 45.8 48.1 50.4*   PLATELETCT 77* 70* 72*   MPV 10.5 10.4 10.8     Recent Labs     09/26/19  1904 09/27/19  0315   SODIUM 136 141   POTASSIUM 6.0* 4.0   CHLORIDE 111 114*   CO2 15* 21   GLUCOSE 165* 140*   BUN 23* 26*   CREATININE 0.58 0.68   CALCIUM 7.0* 6.6*     Recent Labs     09/26/19  1904 09/27/19  0912   APTT 34.0  --    INR 1.82* 1.81*               Imaging  DX-CHEST-PORTABLE (1 VIEW)   Final Result      Right central venous line in place, with no pneumothorax.      US-ABDOMEN COMPLETE SURVEY   Final Result      Nodular contour to the liver, which could indicate cirrhotic change.   Minimal ascites in all four abdominal quadrants.         DX-CHEST-PORTABLE (1 VIEW)   Final Result      No acute cardiopulmonary abnormality.           Assessment/Plan  * Acute GI bleeding- (present on admission)  Assessment & Plan  GI consulting  IV Protonix and octreotide drip  Monitoring hemoglobin every 6 hours  Transfuse for hemoglobin less than 7  IV fluids  Need ongoing encouragement to stop alcohol use  Status post platelets, FFP, vitamin K  9/27 INR: 1.81 monitor    Alcoholic liver disease (HCC)- (present on admission)  Assessment & Plan  Was sober for 4 years but started drinking again per notes for past 3months.  Not transplant  candidate with ongoing drinking.  GI consulting.    Hypomagnesemia- (present on admission)  Assessment & Plan  Replace and monitor  MgSO4 4g IVPB    Increased ammonia level  Assessment & Plan  Initiated rifaximin and lactulose  Alert 9/27    Anemia requiring transfusions- (present on admission)  Assessment & Plan  Due to acute GIB  Monitor Hgb q 6 hr  Transfuse    Thrombocytopenia (HCC)- (present on admission)  Assessment & Plan  Monitor CBC    Alcohol abuse- (present on admission)  Assessment & Plan  EtOH withdraw monitoring.  Seizure precautions  Folate/thiamine IV       VTE prophylaxis: SCD's

## 2019-09-27 NOTE — ED NOTES
Say from lab called with critical result of Hemoglobin 5.2 and hematocrit of 17.6 at 1933. Critical lab result read back to Saye.   Dr. Delgado notified of critical lab result at 1934.  Critical lab result read back by Dr. Delgado.    Orders placed by ERP

## 2019-09-27 NOTE — CONSULTS
Critical Care Consultation    Date of consult: 9/26/2019    Referring Physician  Patricia Delgado M.D.    Reason for Consultation  UGIB    History of Presenting Illness  44 y.o. male with a past medical history of alcohol abuse, cirrhosis and portal hypertension, variceal hemorrhage who presented 9/26/2019 with upper GI bleeding.  Patient states yesterday he began to have dark bloody bowel movements then shortly afterwards began vomiting bright red blood.  The patient reports he had a similar episode in April in which varices were found and banded.  Patient states he was sober for quite some time however began drinking approximately 3 months ago and his last drink was on Sunday.  The patient states that with this GI bleeding he has had dizziness.  He denies any fevers, chills, abdominal pain.  Labs in the ER include a hemoglobin of 5.2, WBC 11.6, platelet count 194, potassium 6, CO2 15, glucose 165, BUN 23, calcium 7, , ALT 91, albumin 2.8, INR 1.82 serum alcohol negative.  GI was consulted in the emergency department with a plan for EGD in the morning.  The patient is to be admitted to the ICU for further care of his upper GI bleeding.    The patient was referred to [facility] liver transplant service in 2015 however at that time was not abstinent and liver disease was too early for consideration of transplant    Code Status  Full Code    Review of Systems  Review of Systems   Constitutional: Negative for chills and fever.   Respiratory: Negative for cough, sputum production, shortness of breath and stridor.    Cardiovascular: Negative for chest pain.   Gastrointestinal: Positive for blood in stool, melena, nausea and vomiting. Negative for abdominal pain.   Genitourinary: Negative for dysuria.   Musculoskeletal: Negative for myalgias.   Skin: Negative for rash.   Neurological: Positive for dizziness. Negative for sensory change and focal weakness.       Past Medical History   has a past medical history  of Anemia, ETOH abuse, GI bleed, Liver disease, and Pancreatitis.    Surgical History   has a past surgical history that includes hernia repair; other; and gastroscopy-endo (N/A, 4/13/2019).    Family History  family history is not on file.    Social History   reports that he has never smoked. He has never used smokeless tobacco. He reports that he does not drink alcohol or use drugs.    Medications  Home Medications     Reviewed by Salma Bangura (Pharmacy Tech) on 09/26/19 at 2037  Med List Status: Complete   Medication Last Dose Status   ibuprofen (MOTRIN) 800 MG Tab 9/23/2019 Active              Current Facility-Administered Medications   Medication Dose Route Frequency Provider Last Rate Last Dose   • senna-docusate (PERICOLACE or SENOKOT S) 8.6-50 MG per tablet 2 Tab  2 Tab Oral BID Marisa Cerna M.D.        And   • polyethylene glycol/lytes (MIRALAX) PACKET 1 Packet  1 Packet Oral QDAY PRN Marisa Cerna M.D.        And   • magnesium hydroxide (MILK OF MAGNESIA) suspension 30 mL  30 mL Oral QDAY PRN Marisa Cerna M.D.        And   • bisacodyl (DULCOLAX) suppository 10 mg  10 mg Rectal QDAY PRN Marisa Cerna M.D.       • lactated ringers infusion   Intravenous Continuous Marisa Cerna M.D.       • ondansetron (ZOFRAN) syringe/vial injection 4 mg  4 mg Intravenous Q4HRS PRN Marisa Cerna M.D.       • ondansetron (ZOFRAN ODT) dispertab 4 mg  4 mg Oral Q4HRS PRN Marisa Cerna M.D.       • promethazine (PHENERGAN) tablet 12.5-25 mg  12.5-25 mg Oral Q4HRS PRN Marisa Cerna M.D.       • promethazine (PHENERGAN) suppository 12.5-25 mg  12.5-25 mg Rectal Q4HRS PRN Marisa Cerna M.D.       • prochlorperazine (COMPAZINE) injection 5-10 mg  5-10 mg Intravenous Q4HRS PRN Marisa Cerna M.D.       • pantoprazole (PROTONIX) 80 mg in  mL IVPB  80 mg Intravenous Once Marisa Cerna M.D.       • pantoprazole (PROTONIX) 80 mg in  mL Infusion  8 mg/hr Intravenous Continuous Marisa Cerna,  M.D.       • octreotide (SANDOSTATIN) 1,250 mcg in  mL Infusion  50 mcg/hr Intravenous Continuous Marisa Cerna M.D.       • [START ON 9/27/2019] cefTRIAXone (ROCEPHIN) 2 g in  mL IVPB  2 g Intravenous Q24HRS HELDER Rubio Jr.O.       • LORazepam (ATIVAN) injection 4 mg  4 mg Intravenous Q15 MIN PRN Allan Courtney Jr., D.O.        Or   • LORazepam (ATIVAN) injection 3 mg  3 mg Intravenous Q15 MIN PRN HELDER Rubio Jr.O.        Or   • LORazepam (ATIVAN) injection 2 mg  2 mg Intravenous Q15 MIN PRN Allan Courtney Jr., D.O.        Or   • LORazepam (ATIVAN) injection 1 mg  1 mg Intravenous Q15 MIN PRN LORY Rubio Jr..PEDRO LUIS.       • detox IV 1000 mL (D5LR + magnesium 1 g + thiamine 100 mg + folic acid 1 mg) infusion   Intravenous Once Allan Courtney Jr., D.O.        And   • [START ON 9/27/2019] thiamine tablet 100 mg  100 mg Oral DAILY Allan Courtney Jr., D.O.        And   • [START ON 9/27/2019] multivitamin (THERAGRAN) tablet 1 Tab  1 Tab Oral DAILY Allan Courtney Jr., D.O.        And   • [START ON 9/27/2019] folic acid (FOLVITE) tablet 1 mg  1 mg Oral DAILY HELDER Rubio Jr.O.         Current Outpatient Medications   Medication Sig Dispense Refill   • ibuprofen (MOTRIN) 800 MG Tab Take 800 mg by mouth Once.         Allergies  No Known Allergies    Vital Signs last 24 hours  Temp:  [37.1 °C (98.8 °F)-37.3 °C (99.1 °F)] 37.3 °C (99.1 °F)  Pulse:  [117-128] 117  Resp:  [20-25] 20  BP: (127-131)/(61-70) 127/64  SpO2:  [100 %] 100 %    Physical Exam  Physical Exam   Constitutional: He is oriented to person, place, and time. He appears well-developed and well-nourished.   HENT:   Head: Normocephalic and atraumatic.   Right Ear: External ear normal.   Left Ear: External ear normal.   Mouth/Throat: Oropharynx is clear and moist.   Eyes: Pupils are equal, round, and reactive to light. Conjunctivae and EOM are normal. No scleral icterus.   Neck: Neck supple. No JVD present. No tracheal  deviation present.   Cardiovascular: Normal rate, regular rhythm and intact distal pulses.   Pulmonary/Chest: Effort normal and breath sounds normal. Tachypnea noted. No respiratory distress.   Abdominal: Soft. Bowel sounds are normal. He exhibits no distension.   Musculoskeletal: Normal range of motion. He exhibits no edema or tenderness.   Neurological: He is alert and oriented to person, place, and time. He displays tremor. No cranial nerve deficit. He exhibits normal muscle tone. Coordination normal.   Skin: Skin is warm and dry.   Pale mucosa   Psychiatric: His mood appears anxious.   Nursing note and vitals reviewed.      Fluids  No intake or output data in the 24 hours ending 09/26/19 2054    Laboratory  Recent Results (from the past 48 hour(s))   CBC WITH DIFFERENTIAL    Collection Time: 09/26/19  7:04 PM   Result Value Ref Range    WBC 11.6 (H) 4.8 - 10.8 K/uL    RBC 2.33 (L) 4.70 - 6.10 M/uL    Hemoglobin 5.2 (LL) 14.0 - 18.0 g/dL    Hematocrit 17.6 (LL) 42.0 - 52.0 %    MCV 75.5 (L) 81.4 - 97.8 fL    MCH 22.3 (L) 27.0 - 33.0 pg    MCHC 29.5 (L) 33.7 - 35.3 g/dL    RDW 46.4 35.9 - 50.0 fL    Platelet Count 194 164 - 446 K/uL    MPV 11.0 9.0 - 12.9 fL    Neutrophils-Polys 86.30 (H) 44.00 - 72.00 %    Lymphocytes 8.90 (L) 22.00 - 41.00 %    Monocytes 4.20 0.00 - 13.40 %    Eosinophils 0.00 0.00 - 6.90 %    Basophils 0.20 0.00 - 1.80 %    Immature Granulocytes 0.40 0.00 - 0.90 %    Nucleated RBC 0.00 /100 WBC    Neutrophils (Absolute) 9.97 (H) 1.82 - 7.42 K/uL    Lymphs (Absolute) 1.03 1.00 - 4.80 K/uL    Monos (Absolute) 0.48 0.00 - 0.85 K/uL    Eos (Absolute) 0.00 0.00 - 0.51 K/uL    Baso (Absolute) 0.02 0.00 - 0.12 K/uL    Immature Granulocytes (abs) 0.05 0.00 - 0.11 K/uL    NRBC (Absolute) 0.00 K/uL    Hypochromia 1+     Anisocytosis 2+     Microcytosis 2+    COMP METABOLIC PANEL    Collection Time: 09/26/19  7:04 PM   Result Value Ref Range    Sodium 136 135 - 145 mmol/L    Potassium 6.0 (H) 3.6 - 5.5  mmol/L    Chloride 111 96 - 112 mmol/L    Co2 15 (L) 20 - 33 mmol/L    Anion Gap 10.0 0.0 - 11.9    Glucose 165 (H) 65 - 99 mg/dL    Bun 23 (H) 8 - 22 mg/dL    Creatinine 0.58 0.50 - 1.40 mg/dL    Calcium 7.0 (L) 8.5 - 10.5 mg/dL    AST(SGOT) 170 (H) 12 - 45 U/L    ALT(SGPT) 91 (H) 2 - 50 U/L    Alkaline Phosphatase 45 30 - 99 U/L    Total Bilirubin 1.0 0.1 - 1.5 mg/dL    Albumin 2.8 (L) 3.2 - 4.9 g/dL    Total Protein 5.5 (L) 6.0 - 8.2 g/dL    Globulin 2.7 1.9 - 3.5 g/dL    A-G Ratio 1.0 g/dL   LIPASE    Collection Time: 09/26/19  7:04 PM   Result Value Ref Range    Lipase 66 11 - 82 U/L   PROTHROMBIN TIME (INR)    Collection Time: 09/26/19  7:04 PM   Result Value Ref Range    PT 21.6 (H) 12.0 - 14.6 sec    INR 1.82 (H) 0.87 - 1.13   APTT    Collection Time: 09/26/19  7:04 PM   Result Value Ref Range    APTT 34.0 24.7 - 36.0 sec   COD (ADULT)    Collection Time: 09/26/19  7:04 PM   Result Value Ref Range    ABO Grouping Only A     Rh Grouping Only POS     Antibody Screen-Cod NEG     Component R       R4                  Red Blood Cells     [id number]   issued       09/26/19   20:21      Product Type Red Blood Cells LR Pheresis     Dispense Status issued     Unit Number (Barcoded) [id number]     Product Code (Barcoded) [id number]     Blood Type (Barcoded) 6200    DIAGNOSTIC ALCOHOL    Collection Time: 09/26/19  7:04 PM   Result Value Ref Range    Diagnostic Alcohol 0.00 0.00 g/dL   ESTIMATED GFR    Collection Time: 09/26/19  7:04 PM   Result Value Ref Range    GFR If African American >60 >60 mL/min/1.73 m 2    GFR If Non African American >60 >60 mL/min/1.73 m 2   PERIPHERAL SMEAR REVIEW    Collection Time: 09/26/19  7:04 PM   Result Value Ref Range    Peripheral Smear Review see below    PLATELET ESTIMATE    Collection Time: 09/26/19  7:04 PM   Result Value Ref Range    Plt Estimation Normal    MORPHOLOGY    Collection Time: 09/26/19  7:04 PM   Result Value Ref Range    RBC Morphology Present     Polychromia 1+      Poikilocytosis 1+     Ovalocytes 1+     Echinocytes 1+    DIFFERENTIAL COMMENT    Collection Time: 19  7:04 PM   Result Value Ref Range    Comments-Diff see below    EKG (NOW)    Collection Time: 19  7:34 PM   Result Value Ref Range    Report       [facility] Emergency Dept[facility]    Test Date:  2019  Pt Name:    JHON TURNER              Department: ER  MRN:        [id number]                      Room:       [facility]  Gender:     Male                         Technician: [id number]  :        1975                   Requested By:RUBY FRANK  Order #:    [id number]                    Reading MD: RUBY FRANK MD    Measurements  Intervals                                Axis  Rate:       130                          P:          28  NC:         128                          QRS:        34  QRSD:       78                           T:          26  QT:         332  QTc:        488    Interpretive Statements  SINUS TACHYCARDIA  EARLY PRECORDIAL R/S TRANSITION  BORDERLINE PROLONGED QT INTERVAL  No previous ECG available for comparison    Electronically Signed On 2019 19:57:32 PDT by RUBY FRANK MD         Imaging  US-ABDOMEN COMPLETE SURVEY   Final Result      Nodular contour to the liver, which could indicate cirrhotic change.   Minimal ascites in all four abdominal quadrants.         DX-CHEST-PORTABLE (1 VIEW)   Final Result      No acute cardiopulmonary abnormality.      DX-CHEST-PORTABLE (1 VIEW)    (Results Pending)       Assessment/Plan  * Acute GI bleeding- (present on admission)  Assessment & Plan  Portal gastropathy versus variceal hemorrhage, history of both in the past  Continue octreotide, PPI, Rocephin  GI on board  Plan for scope in the morning  Closely monitor hemodynamics, currently showing some early signs of shock with tachycardia however blood pressure is preserved  Serial H&H, transfuse for hemoglobin less than 7  MELD 14: 6% 3 month mortality  Shaniqua 40.6,  holding steroids for GI bleeding    Anemia requiring transfusions- (present on admission)  Assessment & Plan  Blood loss anemia  Treatment as noted under acute GI bleeding    Alcohol abuse- (present on admission)  Assessment & Plan  Continued alcohol abuse with last drink on 9/22/19  Monitor for signs of withdrawal  CIWA  Vitamin supplementation        Discussed patient condition and risk of morbidity and/or mortality with Hospitalist, RN, RT, Pharmacy, Code status disscussed, Patient and ERP.      The patient remains critically ill.  Critical care time = 32 minutes in directly providing and coordinating critical care and extensive data review.  No time overlap and excludes procedures.

## 2019-09-27 NOTE — ASSESSMENT & PLAN NOTE
Was sober for 4 years but started drinking again per notes for past 3months.  Not transplant candidate with ongoing drinking.  GI consulting.

## 2019-09-27 NOTE — ASSESSMENT & PLAN NOTE
We have encouraged cessation  Continued alcohol abuse with last drink on 9/22/19  Monitor for signs of withdrawal  CIWA  Vitamin supplementation

## 2019-09-27 NOTE — ASSESSMENT & PLAN NOTE
EGD 9/27 no signs of active hemorrhage. 3 esophageal varices banded  D/c octreotide  BID PPI  Rocephin x 7 days  GI following  Closely monitor hemodynamics, Serial H&H, transfuse for hemoglobin less than 7    Admission:  MELD 14: 6% 3 month mortality  Jakdrey 40.6, holding steroids for GI bleeding

## 2019-09-27 NOTE — PROGRESS NOTES
Pt picked up in ER, report given by Edinson NAIK.  Pt arrive to unit and transferred to ICU bed, monitor set up and leads attached. Chlorhex wipes used. Vitals, weight and height obtained.  Assisted pt to BSC w/ one assist and back to bed after bloody stool and urine void.  Pt called brother

## 2019-09-28 LAB
AFP-TM SERPL-MCNC: 1 NG/ML (ref 0–9)
ANION GAP SERPL CALC-SCNC: 4 MMOL/L (ref 0–11.9)
BUN SERPL-MCNC: 17 MG/DL (ref 8–22)
CALCIUM SERPL-MCNC: 7.1 MG/DL (ref 8.5–10.5)
CHLORIDE SERPL-SCNC: 112 MMOL/L (ref 96–112)
CO2 SERPL-SCNC: 22 MMOL/L (ref 20–33)
CREAT SERPL-MCNC: 0.65 MG/DL (ref 0.5–1.4)
ERYTHROCYTE [DISTWIDTH] IN BLOOD BY AUTOMATED COUNT: 49.1 FL (ref 35.9–50)
GLUCOSE SERPL-MCNC: 129 MG/DL (ref 65–99)
HCT VFR BLD AUTO: 23.4 % (ref 42–52)
HGB BLD-MCNC: 7.6 G/DL (ref 14–18)
INR PPP: 1.58 (ref 0.87–1.13)
MAGNESIUM SERPL-MCNC: 1.9 MG/DL (ref 1.5–2.5)
MCH RBC QN AUTO: 26.1 PG (ref 27–33)
MCHC RBC AUTO-ENTMCNC: 32.5 G/DL (ref 33.7–35.3)
MCV RBC AUTO: 80.4 FL (ref 81.4–97.8)
PLATELET # BLD AUTO: 62 K/UL (ref 164–446)
PMV BLD AUTO: 10.2 FL (ref 9–12.9)
POTASSIUM SERPL-SCNC: 3.8 MMOL/L (ref 3.6–5.5)
PROTHROMBIN TIME: 19.3 SEC (ref 12–14.6)
RBC # BLD AUTO: 2.91 M/UL (ref 4.7–6.1)
SODIUM SERPL-SCNC: 138 MMOL/L (ref 135–145)
WBC # BLD AUTO: 4.2 K/UL (ref 4.8–10.8)

## 2019-09-28 PROCEDURE — 700102 HCHG RX REV CODE 250 W/ 637 OVERRIDE(OP): Performed by: HOSPITALIST

## 2019-09-28 PROCEDURE — 700105 HCHG RX REV CODE 258: Performed by: HOSPITALIST

## 2019-09-28 PROCEDURE — 99233 SBSQ HOSP IP/OBS HIGH 50: CPT | Performed by: PSYCHIATRY & NEUROLOGY

## 2019-09-28 PROCEDURE — 700102 HCHG RX REV CODE 250 W/ 637 OVERRIDE(OP): Performed by: INTERNAL MEDICINE

## 2019-09-28 PROCEDURE — A9270 NON-COVERED ITEM OR SERVICE: HCPCS | Performed by: HOSPITALIST

## 2019-09-28 PROCEDURE — 700111 HCHG RX REV CODE 636 W/ 250 OVERRIDE (IP): Performed by: HOSPITALIST

## 2019-09-28 PROCEDURE — 85027 COMPLETE CBC AUTOMATED: CPT

## 2019-09-28 PROCEDURE — A9270 NON-COVERED ITEM OR SERVICE: HCPCS | Performed by: INTERNAL MEDICINE

## 2019-09-28 PROCEDURE — 85610 PROTHROMBIN TIME: CPT

## 2019-09-28 PROCEDURE — 770001 HCHG ROOM/CARE - MED/SURG/GYN PRIV*

## 2019-09-28 PROCEDURE — 83735 ASSAY OF MAGNESIUM: CPT

## 2019-09-28 PROCEDURE — 99233 SBSQ HOSP IP/OBS HIGH 50: CPT | Performed by: HOSPITALIST

## 2019-09-28 PROCEDURE — 80048 BASIC METABOLIC PNL TOTAL CA: CPT

## 2019-09-28 PROCEDURE — 82105 ALPHA-FETOPROTEIN SERUM: CPT

## 2019-09-28 RX ORDER — MAGNESIUM SULFATE HEPTAHYDRATE 40 MG/ML
2 INJECTION, SOLUTION INTRAVENOUS ONCE
Status: COMPLETED | OUTPATIENT
Start: 2019-09-28 | End: 2019-09-28

## 2019-09-28 RX ORDER — PROPRANOLOL HYDROCHLORIDE 10 MG/1
10 TABLET ORAL EVERY 8 HOURS
Status: DISCONTINUED | OUTPATIENT
Start: 2019-09-28 | End: 2019-09-29 | Stop reason: HOSPADM

## 2019-09-28 RX ORDER — AMOXICILLIN AND CLAVULANATE POTASSIUM 875; 125 MG/1; MG/1
1 TABLET, FILM COATED ORAL EVERY 12 HOURS
Status: DISCONTINUED | OUTPATIENT
Start: 2019-09-28 | End: 2019-09-29 | Stop reason: HOSPADM

## 2019-09-28 RX ADMIN — THERA TABS 1 TABLET: TAB at 05:57

## 2019-09-28 RX ADMIN — FOLIC ACID 1 MG: 1 TABLET ORAL at 05:57

## 2019-09-28 RX ADMIN — AMOXICILLIN AND CLAVULANATE POTASSIUM 1 TABLET: 875; 125 TABLET, FILM COATED ORAL at 17:53

## 2019-09-28 RX ADMIN — PROPRANOLOL HYDROCHLORIDE 10 MG: 10 TABLET ORAL at 14:55

## 2019-09-28 RX ADMIN — MAGNESIUM SULFATE IN WATER 2 G: 40 INJECTION, SOLUTION INTRAVENOUS at 10:28

## 2019-09-28 RX ADMIN — PROPRANOLOL HYDROCHLORIDE 10 MG: 10 TABLET ORAL at 22:27

## 2019-09-28 RX ADMIN — AMOXICILLIN AND CLAVULANATE POTASSIUM 1 TABLET: 875; 125 TABLET, FILM COATED ORAL at 11:40

## 2019-09-28 RX ADMIN — OCTREOTIDE ACETATE 50 MCG/HR: 200 INJECTION, SOLUTION INTRAVENOUS; SUBCUTANEOUS at 00:23

## 2019-09-28 RX ADMIN — OMEPRAZOLE 40 MG: 20 CAPSULE, DELAYED RELEASE ORAL at 05:57

## 2019-09-28 RX ADMIN — SENNOSIDES, DOCUSATE SODIUM 2 TABLET: 50; 8.6 TABLET, FILM COATED ORAL at 05:57

## 2019-09-28 RX ADMIN — OMEPRAZOLE 40 MG: 20 CAPSULE, DELAYED RELEASE ORAL at 17:53

## 2019-09-28 RX ADMIN — Medication 100 MG: at 05:57

## 2019-09-28 ASSESSMENT — ENCOUNTER SYMPTOMS
VOMITING: 0
SORE THROAT: 0
HEMOPTYSIS: 0
COUGH: 0
CHILLS: 0
ABDOMINAL PAIN: 1
FALLS: 0
HEARTBURN: 0
DIARRHEA: 0
BRUISES/BLEEDS EASILY: 0
BLOOD IN STOOL: 0
SHORTNESS OF BREATH: 0
FEVER: 0
PALPITATIONS: 0
BLURRED VISION: 0
DEPRESSION: 0
STRIDOR: 0
CONSTIPATION: 0
DIZZINESS: 0
ABDOMINAL PAIN: 0
NAUSEA: 0
NERVOUS/ANXIOUS: 0
BACK PAIN: 0
HEADACHES: 0

## 2019-09-28 NOTE — DISCHARGE PLANNING
Anticipated Discharge Disposition: home    Action: Spoke with patient and confirmed address, emergency contacts, phone number, and PCP. Patient lives with brother in [address] [facility] [address]. His spouse lives in [place]. Patient was independent in ADLs prior to arrival and denies DME use. Will discharge by private car with brother. Patient has no AD and denies booklet. Preferred pharmacy is [facility] on  [address] in [place].     Barriers to Discharge: medical clearance    Plan: discharge home once medically clear

## 2019-09-28 NOTE — DISCHARGE PLANNING
Care Transition Team Assessment    Information Source  Orientation : Oriented x 4  Information Given By: Patient  Informant's Name: Jimbo Santacruz  Who is responsible for making decisions for patient? : Patient    Readmission Evaluation  Is this a readmission?: No    Elopement Risk  Legal Hold: No  Ambulatory or Self Mobile in Wheelchair: Yes  Disoriented: No  Psychiatric Symptoms: None  History of Wandering: No  Elopement this Admit: No  Vocalizing Wanting to Leave: Yes-At Risk for Elopement  Displays Behaviors, Body Language Wanting to Leave: No-Not at Risk for Elopement  Elopement Risk: At Risk for Elopement    Interdisciplinary Discharge Planning  Does Admitting Nurse Feel This Could be a Complex Discharge?: No  Primary Care Physician: Dr Osman Rolon  Lives with - Patient's Self Care Capacity: Other (Comments)(brother)  Patient or legal guardian wants to designate a caregiver (see row info): No  Support Systems: Spouse / Significant Other, Family Member(s)  Housing / Facility: [address]  Do You Take your Prescribed Medications Regularly: Yes  Able to Return to Previous ADL's: Yes  Mobility Issues: No  Prior Services: Home-Independent  Patient Expects to be Discharged to:: home  Assistance Needed: No  Durable Medical Equipment: Not Applicable    Discharge Preparedness  What is your plan after discharge?: Home with help  What are your discharge supports?: Sibling  Prior Functional Level: Independent with Activities of Daily Living, Independent with Medication Management    Functional Assesment  Prior Functional Level: Independent with Activities of Daily Living, Independent with Medication Management              Values / Beliefs / Concerns  Values / Beliefs Concerns : No    Advance Directive  Advance Directive?: None  Advance Directive offered?: AD Booklet refused    Domestic Abuse  Have you ever been the victim of abuse or violence?: No              Anticipated Discharge Information  Anticipated discharge  disposition: Home  Discharge Address: [address] [place] [address] [address]  Discharge Contact Phone Number: [phone]

## 2019-09-28 NOTE — PROGRESS NOTES
Lab called to add on Magnesium to morning lab draw. Lab states they will run it off of morning labs.

## 2019-09-28 NOTE — PROGRESS NOTES
Gastroenterology Progress Note     Author: Roberto Hankins   Date & Time Created: 9/28/2019 4:37 PM    Chief Complaint:  GI bleed    Interval History:  No further bleeding s/p EGD and banding. Says that he feels well and is asking for discharge    Review of Systems:  Review of Systems   Constitutional: Negative for chills and fever.   Respiratory: Negative for shortness of breath.    Cardiovascular: Negative for chest pain.   Gastrointestinal: Positive for melena. Negative for abdominal pain, blood in stool, constipation, diarrhea, heartburn, nausea and vomiting.       Physical Exam:  Physical Exam   Constitutional: No distress.   Cardiovascular: Normal rate and regular rhythm.   Pulmonary/Chest: Effort normal and breath sounds normal.   Abdominal: Soft. Bowel sounds are normal. He exhibits no distension. There is no tenderness.       Labs:          Recent Labs     09/26/19 1904 09/27/19 0315 09/28/19  0549   SODIUM 136 141 138   POTASSIUM 6.0* 4.0 3.8   CHLORIDE 111 114* 112   CO2 15* 21 22   BUN 23* 26* 17   CREATININE 0.58 0.68 0.65   MAGNESIUM  --  1.2* 1.9   PHOSPHORUS  --  2.5  --    CALCIUM 7.0* 6.6* 7.1*     Recent Labs     09/26/19 1904 09/27/19  0315 09/28/19  0549   ALTSGPT 91*  --   --    ASTSGOT 170*  --   --    ALKPHOSPHAT 45  --   --    TBILIRUBIN 1.0  --   --    LIPASE 66  --   --    GLUCOSE 165* 140* 129*     Recent Labs     09/26/19 1904 09/27/19  0912 09/27/19  1608 09/27/19  2205 09/28/19  0549 09/28/19  0617   RBC 2.33*   < > 2.64* 2.98*  --  2.91*  --    HEMOGLOBIN 5.2*   < > 6.6* 7.4* 7.7* 7.6*  --    HEMATOCRIT 17.6*   < > 20.5* 23.7*  --  23.4*  --    PLATELETCT 194   < > 70* 72*  --  62*  --    PROTHROMBTM 21.6*  --  21.5*  --   --   --  19.3*   APTT 34.0  --   --   --   --   --   --    INR 1.82*  --  1.81*  --   --   --  1.58*    < > = values in this interval not displayed.     Recent Labs     09/26/19  1904  09/27/19  0912 09/27/19  1608 09/28/19  0549   WBC 11.6*   < > 5.4 4.5*  4.2*   NEUTSPOLYS 86.30*  --  70.00 69.10  --    LYMPHOCYTES 8.90*  --  18.00* 19.70*  --    MONOCYTES 4.20  --  9.70 7.70  --    EOSINOPHILS 0.00  --  1.30 2.20  --    BASOPHILS 0.20  --  0.60 0.90  --    ASTSGOT 170*  --   --   --   --    ALTSGPT 91*  --   --   --   --    ALKPHOSPHAT 45  --   --   --   --    TBILIRUBIN 1.0  --   --   --   --     < > = values in this interval not displayed.     Hemodynamics:  Temp (24hrs), Av.2 °C (98.9 °F), Min:36.7 °C (98 °F), Max:37.5 °C (99.5 °F)  Temperature: 36.7 °C (98 °F)  Pulse  Av.8  Min: 83  Max: 131  Blood Pressure: 137/85     Respiratory:    Respiration: 17, Pulse Oximetry: 97 %        RUL Breath Sounds: Clear, RML Breath Sounds: Clear, RLL Breath Sounds: Diminished, MENDEZ Breath Sounds: Clear, LLL Breath Sounds: Diminished  Fluids:    Intake/Output Summary (Last 24 hours) at 2019 1637  Last data filed at 2019 1200  Gross per 24 hour   Intake 2748.33 ml   Output 1500 ml   Net 1248.33 ml     Weight: 112.9 kg (248 lb 14.4 oz)  GI/Nutrition:  Orders Placed This Encounter   Procedures   • Diet Order Low Fiber(GI Soft)     Standing Status:   Standing     Number of Occurrences:   1     Order Specific Question:   Diet:     Answer:   Low Fiber(GI Soft) [2]     Medical Decision Making, by Problem:  Active Hospital Problems    Diagnosis   • *Acute GI bleeding [K92.2]   • Hypomagnesemia [E83.42]   • Alcoholic liver disease (HCC) [K70.9]   • Alcohol abuse [F10.10]   • Increased ammonia level [R79.89]   • Hypocalcemia [E83.51]   • Anemia requiring transfusions [D64.9]   • Thrombocytopenia (HCC) [D69.6]       Plan:  43 yo male with acute GI bleed secondary to hemorrhagic portal hypertensive gastropathy vs esophageal varices. S/p variceal banding. Stop octreotide, Abx x 7 days, PPI BID, Ok to advance diet. Patient needs to remain abstinent from ETOH. He will need repeat EGD with banding in 4-6 weeks as outpatient with primary GI. 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     Quality-Core Measures

## 2019-09-28 NOTE — CARE PLAN
Problem: Communication  Goal: The ability to communicate needs accurately and effectively will improve  Outcome: PROGRESSING AS EXPECTED     Problem: Safety  Goal: Will remain free from injury  Outcome: PROGRESSING AS EXPECTED  Goal: Will remain free from falls  Outcome: PROGRESSING AS EXPECTED     Problem: Infection  Goal: Will remain free from infection  Outcome: PROGRESSING AS EXPECTED     Problem: Venous Thromboembolism (VTW)/Deep Vein Thrombosis (DVT) Prevention:  Goal: Patient will participate in Venous Thrombosis (VTE)/Deep Vein Thrombosis (DVT)Prevention Measures  Outcome: PROGRESSING AS EXPECTED     Problem: Bowel/Gastric:  Goal: Normal bowel function is maintained or improved  Outcome: PROGRESSING AS EXPECTED  Goal: Will not experience complications related to bowel motility  Outcome: PROGRESSING AS EXPECTED     Problem: Psychosocial Needs:  Goal: Level of anxiety will decrease  Outcome: PROGRESSING AS EXPECTED     Problem: Pain Management:  Goal: Pain level will decrease to patient's comfort goal  Outcome: PROGRESSING AS EXPECTED     Problem: Fluid Volume:  Goal: Maintain a balanced intake and output  Outcome: PROGRESSING AS EXPECTED  Goal: Will show no signs and symptoms of excessive bleeding  Outcome: PROGRESSING AS EXPECTED     Problem: Bowel/Gastric:  Goal: Will show no signs and symptoms of gastrointestinal bleeding  Outcome: PROGRESSING AS EXPECTED     Problem: Physical Regulation:  Goal: Diagnostic test results will improve  Outcome: PROGRESSING AS EXPECTED  Goal: Complications related to the disease process, condition or treatment will be avoided or minimized  Outcome: PROGRESSING AS EXPECTED     Problem: Skin Integrity  Goal: Risk for impaired skin integrity will decrease  Outcome: PROGRESSING AS EXPECTED     Problem: Knowledge Deficit  Goal: Knowledge of disease process/condition, treatment plan, diagnostic tests, and medications will improve  Outcome: PROGRESSING SLOWER THAN EXPECTED  Goal:  Knowledge of the prescribed therapeutic regimen will improve  Outcome: PROGRESSING SLOWER THAN EXPECTED     Problem: Discharge Barriers/Planning  Goal: Patient's continuum of care needs will be met  Outcome: PROGRESSING SLOWER THAN EXPECTED

## 2019-09-28 NOTE — PROGRESS NOTES
[facility] Medicine Daily Progress Note    Date of Service  9/28/2019    Chief Complaint  Vomiting blood and black stool    Hospital Course    44 y.o. male with a hx of alcoholic cirrhosis and admitted 9/26/2019 with GI bleed      Interval Problem Update  Awake  LR 75cc/hr  S/P calcium IV replacment  On 1L NC  Speech clear  On oral PPI per GI  Finishing octreotide  Abdominal distention  Afebrile  UOP:1500cc  Alerted him he would be in hospital another few days.      Consultants/Specialty  GI  Intensivist      Code Status  FULL    Disposition  Transfer to medical floor    Review of Systems  Review of Systems   Constitutional: Positive for malaise/fatigue. Negative for fever.   HENT: Negative for congestion.    Respiratory: Negative for cough, shortness of breath and stridor.    Cardiovascular: Negative for chest pain, palpitations and leg swelling.   Gastrointestinal: Positive for melena. Negative for abdominal pain, blood in stool and nausea.   Genitourinary: Negative for dysuria and hematuria.   Musculoskeletal: Negative for back pain.   Neurological: Negative for headaches.   Psychiatric/Behavioral: The patient is not nervous/anxious.         Physical Exam  Temp:  [36.5 °C (97.7 °F)-37.5 °C (99.5 °F)] 36.7 °C (98 °F)  Pulse:  [] 83  Resp:  [17-32] 17  BP: (128-156)/(69-90) 137/85  SpO2:  [96 %-100 %] 97 %    Physical Exam   Constitutional: He is oriented to person, place, and time. He appears well-developed. No distress.   HENT:   Head: Normocephalic and atraumatic.   Nose: Nose normal.   Mouth/Throat: No oropharyngeal exudate.   Eyes: Conjunctivae and EOM are normal. Right eye exhibits no discharge. Left eye exhibits no discharge. No scleral icterus.   Neck: No tracheal deviation present.   Cardiovascular: Normal rate, regular rhythm and normal heart sounds.   No murmur heard.  Pulses:       Radial pulses are 2+ on the right side, and 2+ on the left side.        Dorsalis pedis pulses are 2+ on the right side,  and 2+ on the left side.   Pulmonary/Chest: Effort normal and breath sounds normal. No stridor. No respiratory distress. He has no wheezes.   Abdominal: Soft. Bowel sounds are normal. He exhibits distension. There is no tenderness. There is no rebound.   Musculoskeletal: He exhibits edema.   Neurological: He is alert and oriented to person, place, and time. No cranial nerve deficit.   Skin: Skin is warm. He is not diaphoretic.   Psychiatric: He has a normal mood and affect. His behavior is normal.       Fluids    Intake/Output Summary (Last 24 hours) at 9/28/2019 1420  Last data filed at 9/28/2019 1200  Gross per 24 hour   Intake 3706.25 ml   Output 1500 ml   Net 2206.25 ml       Laboratory  Recent Labs     09/27/19  0912 09/27/19  1608 09/27/19  2205 09/28/19  0549   WBC 5.4 4.5*  --  4.2*   RBC 2.64* 2.98*  --  2.91*   HEMOGLOBIN 6.6* 7.4* 7.7* 7.6*   HEMATOCRIT 20.5* 23.7*  --  23.4*   MCV 77.7* 79.5*  --  80.4*   MCH 25.0* 24.8*  --  26.1*   MCHC 32.2* 31.2*  --  32.5*   RDW 48.1 50.4*  --  49.1   PLATELETCT 70* 72*  --  62*   MPV 10.4 10.8  --  10.2     Recent Labs     09/26/19  1904 09/27/19  0315 09/28/19  0549   SODIUM 136 141 138   POTASSIUM 6.0* 4.0 3.8   CHLORIDE 111 114* 112   CO2 15* 21 22   GLUCOSE 165* 140* 129*   BUN 23* 26* 17   CREATININE 0.58 0.68 0.65   CALCIUM 7.0* 6.6* 7.1*     Recent Labs     09/26/19 1904 09/27/19  0912 09/28/19  0617   APTT 34.0  --   --    INR 1.82* 1.81* 1.58*               Imaging  DX-CHEST-PORTABLE (1 VIEW)   Final Result      Right central venous line in place, with no pneumothorax.      US-ABDOMEN COMPLETE SURVEY   Final Result      Nodular contour to the liver, which could indicate cirrhotic change.   Minimal ascites in all four abdominal quadrants.         DX-CHEST-PORTABLE (1 VIEW)   Final Result      No acute cardiopulmonary abnormality.           Assessment/Plan  * Acute GI bleeding- (present on admission)  Assessment & Plan  GI consulting  9/27 Endoscopy:   1.   severe hemorrhagic portal gastropathy. This could have bled in the setting of NSAID use  2.  Large esophageal varices with red nisa signs. 3 ligation bands were placed    IV Protonix changed to oral  Monitor daily hemoglobin  Transfuse for hemoglobin less than 7  Wean IV fluids  Need ongoing encouragement to stop alcohol use  Status post platelets, FFP, vitamin K  9/27 INR: 1.81 monitor  9/28 INR: 1.58    Alcoholic liver disease (HCC)- (present on admission)  Assessment & Plan  Was sober for 4 years but started drinking again per notes for past 3months.  Not transplant candidate with ongoing drinking.  GI consulting.    Hypomagnesemia- (present on admission)  Assessment & Plan  Replace and monitor  MgSO4 4g IVPB    Hypocalcemia  Assessment & Plan  Corrected for low albumin  Check ionized calcium  Supplement as needed and monitor    Increased ammonia level  Assessment & Plan  Initiated rifaximin and lactulose  Alert 9/27    Anemia requiring transfusions- (present on admission)  Assessment & Plan  Due to acute GIB  Monitor CBC  Transfuse for hemoglobin less than 7    Thrombocytopenia (HCC)- (present on admission)  Assessment & Plan  Monitor CBC    Alcohol abuse- (present on admission)  Assessment & Plan  EtOH withdraw monitoring.  Seizure precautions  Folate/thiamine IV       VTE prophylaxis: SCD's

## 2019-09-28 NOTE — PROGRESS NOTES
Critical Care Progress Note    Date of admission  9/26/2019    Chief Complaint  44 y.o. male admitted 9/26/2019 with an Acute Upper GI Bleed    Hospital Course    44 y.o. male with a past medical history of alcohol abuse, cirrhosis and portal hypertension, variceal hemorrhage who presented 9/26/2019 with upper GI bleeding.  Patient states yesterday he began to have dark bloody bowel movements then shortly afterwards began vomiting bright red blood.  The patient reports he had a similar episode in April in which varices were found and banded.  Patient states he was sober for quite some time however began drinking approximately 3 months ago and his last drink was on Sunday.  The patient states that with this GI bleeding he has had dizziness.  He denies any fevers, chills, abdominal pain.  Labs in the ER include a hemoglobin of 5.2, WBC 11.6, platelet count 194, potassium 6, CO2 15, glucose 165, BUN 23, calcium 7, , ALT 91, albumin 2.8, INR 1.82 serum alcohol negative.  GI was consulted in the emergency department with a plan for EGD in the morning.  The patient is to be admitted to the ICU for further care of his upper GI bleeding.     The patient was referred to [facility] liver transplant service in 2015 however at that time was not abstinent and liver disease was too early for consideration of transplant         Interval Problem Update  Reviewed last 24 hour events:  - 4units o PRBCS and 2plts since admit stable overnight. GI Banded 3 varices no active hemorrhage.   - Neuro: GCS 15 RASS 0   - HR:80s-90s   - SBP: 120s-140s   - GI: clear liquids + BM   - UOP: 1500cc   - Tm: 37.5   - Lines: R IJ CVC   - PPx: GI PPI, DVT hold GI bleed   - CXR (personally reviewed and compared to prior): no imaging this am    - 4 units 1 plt   - Neuro: GCS 15   - HR: 100-120s   - MAPs : 70-90s   - GI: NPO, PPI gtt   - UOP: 500cc   - Humphrey: none   - Tm: 37.9   - Lines: R IJ CVC   - PPx: GI PPI, DVT hold GI bleed       Review  of Systems  Review of Systems   Constitutional: Negative for chills and fever.   HENT: Negative for nosebleeds and sore throat.    Eyes: Negative for blurred vision.   Respiratory: Negative for hemoptysis.    Cardiovascular: Negative for chest pain and palpitations.   Gastrointestinal: Positive for abdominal pain. Negative for nausea.   Genitourinary: Negative for hematuria.   Musculoskeletal: Negative for falls.   Skin: Negative for rash.   Neurological: Negative for dizziness and headaches.   Endo/Heme/Allergies: Does not bruise/bleed easily.   Psychiatric/Behavioral: Negative for depression.        Vital Signs for last 24 hours   Temp:  [36.5 °C (97.7 °F)-37.5 °C (99.5 °F)] 36.7 °C (98 °F)  Pulse:  [] 83  Resp:  [16-32] 17  BP: (123-156)/(69-90) 137/85  SpO2:  [96 %-100 %] 97 %    Hemodynamic parameters for last 24 hours       Respiratory Information for the last 24 hours       Physical Exam   Physical Exam   Constitutional: He is oriented to person, place, and time. He appears well-developed and well-nourished.   HENT:   Head: Normocephalic and atraumatic.   Eyes: Pupils are equal, round, and reactive to light. Conjunctivae and EOM are normal.   Cardiovascular: Normal rate, regular rhythm and intact distal pulses.   Pulmonary/Chest: Effort normal and breath sounds normal. No stridor. No respiratory distress.   Abdominal: Soft. Bowel sounds are normal.   Musculoskeletal: He exhibits no edema.   Neurological: He is alert and oriented to person, place, and time. No cranial nerve deficit. Coordination normal.   GCS 15. Moves all 4 extremities   Skin: Skin is warm and dry.   Pale oral mucosa   Psychiatric: He has a normal mood and affect.       Medications  Current Facility-Administered Medications   Medication Dose Route Frequency Provider Last Rate Last Dose   • propranolol (INDERAL) tablet 10 mg  10 mg Oral Q8HRS Alexx Zuniga D.O.       • amoxicillin-clavulanate (AUGMENTIN) 875-125 MG per tablet 1 Tab  1  Tab Oral Q12HRS Alexx Zuniga D.O.   1 Tab at 09/28/19 1140   • omeprazole (PRILOSEC) capsule 40 mg  40 mg Oral BID Roberto Hankins M.D.   40 mg at 09/28/19 0557   • senna-docusate (PERICOLACE or SENOKOT S) 8.6-50 MG per tablet 2 Tab  2 Tab Oral BID Marisa Cerna M.D.   2 Tab at 09/28/19 0557    And   • polyethylene glycol/lytes (MIRALAX) PACKET 1 Packet  1 Packet Oral QDAY PRN Marisa Cerna M.D.        And   • magnesium hydroxide (MILK OF MAGNESIA) suspension 30 mL  30 mL Oral QDAY PRN Marisa Cerna M.D.        And   • bisacodyl (DULCOLAX) suppository 10 mg  10 mg Rectal QDAY PRN Marisa Cerna M.D.       • ondansetron (ZOFRAN) syringe/vial injection 4 mg  4 mg Intravenous Q4HRS PRN Marisa Cerna M.D.   4 mg at 09/26/19 2214   • ondansetron (ZOFRAN ODT) dispertab 4 mg  4 mg Oral Q4HRS PRN Marisa Cerna M.D.       • promethazine (PHENERGAN) tablet 12.5-25 mg  12.5-25 mg Oral Q4HRS PRN Marisa Cerna M.D.       • promethazine (PHENERGAN) suppository 12.5-25 mg  12.5-25 mg Rectal Q4HRS PRN Marisa Cerna M.D.       • prochlorperazine (COMPAZINE) injection 5-10 mg  5-10 mg Intravenous Q4HRS PRN Marisa Cerna M.D.       • LORazepam (ATIVAN) injection 4 mg  4 mg Intravenous Q15 MIN PRN Allan Courtney Jr. D.O.        Or   • LORazepam (ATIVAN) injection 3 mg  3 mg Intravenous Q15 MIN PRN LORY Rubio Jr..O.        Or   • LORazepam (ATIVAN) injection 2 mg  2 mg Intravenous Q15 MIN PRN Allan Courtney Jr., D.O.   2 mg at 09/27/19 0128    Or   • LORazepam (ATIVAN) injection 1 mg  1 mg Intravenous Q15 MIN PRN Allan Courtney Jr., D.O.   1 mg at 09/27/19 2317   • thiamine tablet 100 mg  100 mg Oral DAILY Allan Courtney Jr., D.O.   100 mg at 09/28/19 0557    And   • multivitamin (THERAGRAN) tablet 1 Tab  1 Tab Oral DAILY Allan Courtney Jr., D.O.   1 Tab at 09/28/19 0557    And   • folic acid (FOLVITE) tablet 1 mg  1 mg Oral DAILY Allan Courtney Jr., D.O.   1 mg at 09/28/19 0557        Fluids    Intake/Output Summary (Last 24 hours) at 9/28/2019 1301  Last data filed at 9/28/2019 1200  Gross per 24 hour   Intake 4662.08 ml   Output 1500 ml   Net 3162.08 ml       Laboratory          Recent Labs     09/26/19 1904 09/27/19 0315 09/28/19  0549   SODIUM 136 141 138   POTASSIUM 6.0* 4.0 3.8   CHLORIDE 111 114* 112   CO2 15* 21 22   BUN 23* 26* 17   CREATININE 0.58 0.68 0.65   MAGNESIUM  --  1.2* 1.9   PHOSPHORUS  --  2.5  --    CALCIUM 7.0* 6.6* 7.1*     Recent Labs     09/26/19 1904 09/27/19 0315 09/28/19  0549   ALTSGPT 91*  --   --    ASTSGOT 170*  --   --    ALKPHOSPHAT 45  --   --    TBILIRUBIN 1.0  --   --    LIPASE 66  --   --    GLUCOSE 165* 140* 129*     Recent Labs     09/26/19 1904 09/27/19 0912 09/27/19 1608 09/28/19  0549   WBC 11.6*   < > 5.4 4.5* 4.2*   NEUTSPOLYS 86.30*  --  70.00 69.10  --    LYMPHOCYTES 8.90*  --  18.00* 19.70*  --    MONOCYTES 4.20  --  9.70 7.70  --    EOSINOPHILS 0.00  --  1.30 2.20  --    BASOPHILS 0.20  --  0.60 0.90  --    ASTSGOT 170*  --   --   --   --    ALTSGPT 91*  --   --   --   --    ALKPHOSPHAT 45  --   --   --   --    TBILIRUBIN 1.0  --   --   --   --     < > = values in this interval not displayed.     Recent Labs     09/26/19 1904 09/27/19 0912 09/27/19 1608 09/27/19 2205 09/28/19  0549 09/28/19  0617   RBC 2.33*   < > 2.64* 2.98*  --  2.91*  --    HEMOGLOBIN 5.2*   < > 6.6* 7.4* 7.7* 7.6*  --    HEMATOCRIT 17.6*   < > 20.5* 23.7*  --  23.4*  --    PLATELETCT 194   < > 70* 72*  --  62*  --    PROTHROMBTM 21.6*  --  21.5*  --   --   --  19.3*   APTT 34.0  --   --   --   --   --   --    INR 1.82*  --  1.81*  --   --   --  1.58*    < > = values in this interval not displayed.       Imaging  X-Ray:  No film today    Assessment/Plan  * Acute GI bleeding- (present on admission)  Assessment & Plan  EGD 9/27 no signs of active hemorrhage. 3 esophageal varices banded  D/c octreotide  BID PPI  Rocephin x 7 days  GI following  Closely monitor  hemodynamics, Serial H&H, transfuse for hemoglobin less than 7    Admission:  MELD 14: 6% 3 month mortality  Shaniqua 40.6, holding steroids for GI bleeding      Alcoholic liver disease (HCC)- (present on admission)  Assessment & Plan  US shows cirrhotic liver change  Encourage EtOH cessation      Hypomagnesemia- (present on admission)  Assessment & Plan  Replete and follow    Hypocalcemia  Assessment & Plan  Replete and follow     Anemia requiring transfusions- (present on admission)  Assessment & Plan  Blood loss anemia  Stable  Daily CBC    Alcohol abuse- (present on admission)  Assessment & Plan  We have encouraged cessation  Continued alcohol abuse with last drink on 9/22/19  Monitor for signs of withdrawal  CIWA  Vitamin supplementation         VTE:  Contraindicated  Ulcer: PPI  Lines: Central Line  Ongoing indication addressed    I have performed a physical exam and reviewed and updated ROS and Plan today (9/28/2019). In review of yesterday's note (9/27/2019), there are no changes except as documented above.     Discussed patient condition and risk of morbidity and/or mortality with Hospitalist, RN, RT, Pharmacy, Code status disscussed, Charge nurse / hot rounds, Patient and GI

## 2019-09-29 VITALS
RESPIRATION RATE: 15 BRPM | OXYGEN SATURATION: 93 % | SYSTOLIC BLOOD PRESSURE: 128 MMHG | TEMPERATURE: 99.9 F | BODY MASS INDEX: 35.63 KG/M2 | HEIGHT: 70 IN | DIASTOLIC BLOOD PRESSURE: 76 MMHG | HEART RATE: 84 BPM | WEIGHT: 248.9 LBS

## 2019-09-29 PROBLEM — E83.42 HYPOMAGNESEMIA: Status: RESOLVED | Noted: 2019-04-13 | Resolved: 2019-09-29

## 2019-09-29 LAB
ALBUMIN SERPL BCP-MCNC: 2.6 G/DL (ref 3.2–4.9)
ALBUMIN/GLOB SERPL: 1 G/DL
ALP SERPL-CCNC: 56 U/L (ref 30–99)
ALT SERPL-CCNC: 146 U/L (ref 2–50)
ANION GAP SERPL CALC-SCNC: 6 MMOL/L (ref 0–11.9)
AST SERPL-CCNC: 185 U/L (ref 12–45)
BILIRUB SERPL-MCNC: 1.2 MG/DL (ref 0.1–1.5)
BUN SERPL-MCNC: 10 MG/DL (ref 8–22)
CALCIUM SERPL-MCNC: 7.2 MG/DL (ref 8.5–10.5)
CHLORIDE SERPL-SCNC: 111 MMOL/L (ref 96–112)
CO2 SERPL-SCNC: 20 MMOL/L (ref 20–33)
CREAT SERPL-MCNC: 0.59 MG/DL (ref 0.5–1.4)
ERYTHROCYTE [DISTWIDTH] IN BLOOD BY AUTOMATED COUNT: 50 FL (ref 35.9–50)
GLOBULIN SER CALC-MCNC: 2.6 G/DL (ref 1.9–3.5)
GLUCOSE SERPL-MCNC: 101 MG/DL (ref 65–99)
HCT VFR BLD AUTO: 24.9 % (ref 42–52)
HGB BLD-MCNC: 8.2 G/DL (ref 14–18)
MCH RBC QN AUTO: 26.3 PG (ref 27–33)
MCHC RBC AUTO-ENTMCNC: 32.9 G/DL (ref 33.7–35.3)
MCV RBC AUTO: 79.8 FL (ref 81.4–97.8)
PLATELET # BLD AUTO: 91 K/UL (ref 164–446)
PMV BLD AUTO: 11.7 FL (ref 9–12.9)
POTASSIUM SERPL-SCNC: 3.8 MMOL/L (ref 3.6–5.5)
PROT SERPL-MCNC: 5.2 G/DL (ref 6–8.2)
RBC # BLD AUTO: 3.12 M/UL (ref 4.7–6.1)
SODIUM SERPL-SCNC: 137 MMOL/L (ref 135–145)
WBC # BLD AUTO: 6.4 K/UL (ref 4.8–10.8)

## 2019-09-29 PROCEDURE — 700102 HCHG RX REV CODE 250 W/ 637 OVERRIDE(OP): Performed by: HOSPITALIST

## 2019-09-29 PROCEDURE — 700102 HCHG RX REV CODE 250 W/ 637 OVERRIDE(OP): Performed by: INTERNAL MEDICINE

## 2019-09-29 PROCEDURE — 85027 COMPLETE CBC AUTOMATED: CPT

## 2019-09-29 PROCEDURE — A9270 NON-COVERED ITEM OR SERVICE: HCPCS | Performed by: INTERNAL MEDICINE

## 2019-09-29 PROCEDURE — 99239 HOSP IP/OBS DSCHRG MGMT >30: CPT | Performed by: HOSPITALIST

## 2019-09-29 PROCEDURE — A9270 NON-COVERED ITEM OR SERVICE: HCPCS | Performed by: HOSPITALIST

## 2019-09-29 PROCEDURE — 80053 COMPREHEN METABOLIC PANEL: CPT

## 2019-09-29 RX ORDER — PROPRANOLOL HYDROCHLORIDE 10 MG/1
10 TABLET ORAL EVERY 8 HOURS
Qty: 90 TAB | Refills: 3 | Status: ON HOLD | OUTPATIENT
Start: 2019-09-29 | End: 2020-10-29

## 2019-09-29 RX ORDER — AMOXICILLIN AND CLAVULANATE POTASSIUM 875; 125 MG/1; MG/1
1 TABLET, FILM COATED ORAL EVERY 12 HOURS
Qty: 4 TAB | Refills: 0 | Status: SHIPPED | OUTPATIENT
Start: 2019-09-29 | End: 2019-10-01

## 2019-09-29 RX ORDER — AMOXICILLIN AND CLAVULANATE POTASSIUM 875; 125 MG/1; MG/1
1 TABLET, FILM COATED ORAL EVERY 12 HOURS
Qty: 4 TAB | Refills: 0 | Status: SHIPPED | OUTPATIENT
Start: 2019-09-29 | End: 2019-09-29

## 2019-09-29 RX ORDER — OMEPRAZOLE 40 MG/1
40 CAPSULE, DELAYED RELEASE ORAL 2 TIMES DAILY
Qty: 60 CAP | Refills: 3 | Status: SHIPPED | OUTPATIENT
Start: 2019-09-29 | End: 2019-09-29

## 2019-09-29 RX ORDER — PROPRANOLOL HYDROCHLORIDE 10 MG/1
10 TABLET ORAL EVERY 8 HOURS
Qty: 90 TAB | Refills: 3 | Status: SHIPPED | OUTPATIENT
Start: 2019-09-29 | End: 2019-09-29

## 2019-09-29 RX ORDER — OMEPRAZOLE 40 MG/1
40 CAPSULE, DELAYED RELEASE ORAL 2 TIMES DAILY
Qty: 60 CAP | Refills: 3 | Status: ON HOLD | OUTPATIENT
Start: 2019-09-29 | End: 2020-10-29 | Stop reason: SDUPTHER

## 2019-09-29 RX ADMIN — AMOXICILLIN AND CLAVULANATE POTASSIUM 1 TABLET: 875; 125 TABLET, FILM COATED ORAL at 05:14

## 2019-09-29 RX ADMIN — OMEPRAZOLE 40 MG: 20 CAPSULE, DELAYED RELEASE ORAL at 05:14

## 2019-09-29 RX ADMIN — FOLIC ACID 1 MG: 1 TABLET ORAL at 05:15

## 2019-09-29 RX ADMIN — Medication 100 MG: at 05:15

## 2019-09-29 RX ADMIN — PROPRANOLOL HYDROCHLORIDE 10 MG: 10 TABLET ORAL at 05:14

## 2019-09-29 RX ADMIN — THERA TABS 1 TABLET: TAB at 05:15

## 2019-09-29 NOTE — DISCHARGE SUMMARY
Discharge Summary    CHIEF COMPLAINT ON ADMISSION  Chief Complaint   Patient presents with   • Lower GI Bleed   • Upper GI Bleed       Reason for Admission  GI Bleed     Admission Date  9/26/2019    CODE STATUS  Full Code    HPI & HOSPITAL COURSE    44 y.o. male with a hx of alcoholic cirrhosis and admitted 9/26/2019 with GI bleed .  He had recently been prescribed ibuprofen by an outside provider.  He had GI consulted and an endoscopy found: 1.  severe hemorrhagic portal gastropathy. This could have bled in the setting of NSAID use. 2.  Large esophageal varices with red nisa signs. 3 ligation bands were placed.  He was stable after endoscopy.  Counseling was given on cessation of NSAIDs and no EtOH use.  He will be on propranolol and omeprazole upone discharge.  Antibiotics for prophylaxis per GI's request through Oct 1st.    His Hgb was 8.2 on day of discharge from hospital with a PLT:91.  He remained with elevated liver enzymes which will need follow up on discharge.  His hepatitis panel was negative during admit.  Alpha feto protein was normal.    Therefore, he is discharged in good and stable condition to home with close outpatient follow-up.    The patient met 2-midnight criteria for an inpatient stay at the time of discharge.    Discharge Date  9/29/2019    FOLLOW UP ITEMS POST DISCHARGE  Future follow up endoscopy.  Monitoring of liver enzymes.    DISCHARGE DIAGNOSES  Principal Problem (Resolved):    Acute GI bleeding POA: Yes      Overview: 3-4 events, probable diagnosis is gastritiis, with some PHG            Due to PUD, DUD by imaging? Not seen on EGD, no varices seen,             Transferred from [facility] to [facility] in [place]    12 4 15             EGD done at [facility] : :             During hosp  WBC 9.8,  plt 184 000,   INR 1.3, ALb 3.1, total bili 1.9,        / ALT 37, , Lipase 145            CT scan showed fatty liver, no other findings, no pancreatitis  Active Problems:    Alcoholic  liver disease (HCC) POA: Yes      Overview: With clear cirrhosis, bili 2.2, INR 1.3, alb 2.8            Last Assessment & Plan:       MELD in 12 - 15 range            Too early for LT eval            Also abstinent for only 2 weeks now            Needs to sign alc contract and have EGD and follow GI liver exam in [place]       and [place]    Thrombocytopenia (HCC) POA: Yes    Anemia requiring transfusions POA: Yes    Increased ammonia level POA: Unknown    Hypocalcemia POA: Unknown    Alcohol abuse POA: Yes      Overview: Alc Abuse up to 1 month ago with 2 episodes for GI bleeding, working in       [facility] and in [place]            Urgent care appt 1 month ago to tell patient to stop drinking            Last Drink Dec 4 th                        Last Assessment & Plan:       Dear [PATIENT],            Due to your diagnosis of advanced liver disease, the Hepatology Team in       conjunction with The Liver Transplant Team at your Medical Center has made       the following recommendations for you, both for good health and to be       potentially considered for a liver transplant in the future.            The team recommendations are as follows:            1. If you are currently drinking alcohol, you must stop all alcohol use       immediately.  You must agree to never drink any alcohol, including       non-alcoholic drinks, or use any products with alcohol.  Alcohol is       contained in some over the counter products i.e.: cough syrup, mouthwash,       etc.  You must also agree to not use any illegal drugs, including       marijuana (even if marijuana has been prescribed).         2. In order to be considered for liver transplantation in the future, you       will need to attend Alcoholics Anonymous (AA) meetings twice a week and       keep logs of your attendance.  You will be asked to present these logs in       clinic. It these logs are not available for review in clinic, we will       presume that you have not  attended AA.  If sessions are missed they may be       made up, however this must be documented.  You may have and evaluation at       any time, but once you have attended AA for approximately six months, with       documentation, your case can be discussed with your liver transplant       center for potential liver transplantation candidacy and listing.        However, attendance at AA will need to continue indefinitely, unless       otherwise determined by the Liver Transplant Team.       3. It is recommended that you have regular follow-up with your primary       care physician, including regular lab work. You should also continue to       have regular follow-up with our  Hepatology team, either in our central       office or in one of our outreach clinic sites.              It is your responsibility to make sure these items are completed.              Sincerely,            Hepatology Group and Liver Transplant Team            I have read and understand the above letter.            _______________________________________________________________________      Patient Signature       Resolved Problems:    Hypomagnesemia POA: Yes      FOLLOW UP  No future appointments.  Shukri Ventura M.D.  [address] [place] [address] Dr ARACELI [place] [id number] [phone]    Schedule an appointment as soon as possible for a visit in 4 weeks        MEDICATIONS ON DISCHARGE     Medication List      START taking these medications      Instructions   amoxicillin-clavulanate 875-125 MG Tabs  Commonly known as:  AUGMENTIN   Take 1 Tab by mouth every 12 hours for 2 days.  Dose:  1 Tab     omeprazole 40 MG delayed-release capsule  Commonly known as:  PRILOSEC   Take 1 Cap by mouth 2 Times a Day.  Dose:  40 mg     propranolol 10 MG Tabs  Commonly known as:  INDERAL   Take 1 Tab by mouth every 8 hours.  Dose:  10 mg        STOP taking these medications    ibuprofen 800 MG Tabs  Commonly known as:  MOTRIN            Allergies  No Known  Allergies    DIET  Orders Placed This Encounter   Procedures   • Diet Order Low Fiber(GI Soft)     Standing Status:   Standing     Number of Occurrences:   1     Order Specific Question:   Diet:     Answer:   Low Fiber(GI Soft) [2]       ACTIVITY  As tolerated.  Weight bearing as tolerated    CONSULTATIONS  Gastroenterology    PROCEDURES  Endoscopy: 1.  severe hemorrhagic portal gastropathy. This could have bled in the setting of NSAID use  2.  Large esophageal varices with red nisa signs. 3 ligation bands were placed    LABORATORY  Lab Results   Component Value Date    SODIUM 137 09/29/2019    POTASSIUM 3.8 09/29/2019    CHLORIDE 111 09/29/2019    CO2 20 09/29/2019    GLUCOSE 101 (H) 09/29/2019    BUN 10 09/29/2019    CREATININE 0.59 09/29/2019        Lab Results   Component Value Date    WBC 6.4 09/29/2019    HEMOGLOBIN 8.2 (L) 09/29/2019    HEMATOCRIT 24.9 (L) 09/29/2019    PLATELETCT 91 (L) 09/29/2019        Total time of the discharge process exceeds 32 minutes.

## 2019-09-29 NOTE — DISCHARGE INSTRUCTIONS
Discharge Instructions per Alexx Zuniga D.O.    Avoid NSAIDS (example: advil, motrin, ibuprofen, naprosyn, alieve, Aspirin).    DIET: Avoid temperature hot beverages and food for the next few weeks.  Keep a bland diet over the next few weeks.  Avoid acidic and spicey foods for next few weeks.  No alcohol use.  2ACTIVITY: As tolerates    Discharge Instructions    Discharged to home by car with relative. Discharged via walking, hospital escort: Refused.  Special equipment needed: Not Applicable    Be sure to schedule a follow-up appointment with your primary care doctor or any specialists as instructed.     Discharge Plan:   Diet Plan: Discussed  Activity Level: Discussed  Confirmed Follow up Appointment: Patient to Call and Schedule Appointment  Confirmed Symptoms Management: Discussed  Medication Reconciliation Updated: Yes  Influenza Vaccine Indication: Patient Refuses    I understand that a diet low in cholesterol, fat, and sodium is recommended for good health. Unless I have been given specific instructions below for another diet, I accept this instruction as my diet prescription.   Other diet: bland diet for 2 weeks, avoid hot beverages and food for 2 weeks. Avoid spicey foods and alcohol      Special Instructions: None    · Is patient discharged on Warfarin / Coumadin?   No     Depression / Suicide Risk    As you are discharged from this [facility] facility, it is important to learn how to keep safe from harming yourself.    Recognize the warning signs:  · Abrupt changes in personality, positive or negative- including increase in energy   · Giving away possessions  · Change in eating patterns- significant weight changes-  positive or negative  · Change in sleeping patterns- unable to sleep or sleeping all the time   · Unwillingness or inability to communicate  · Depression  · Unusual sadness, discouragement and loneliness  · Talk of wanting to die  · Neglect of personal appearance   · Rebelliousness-  reckless behavior  · Withdrawal from people/activities they love  · Confusion- inability to concentrate     If you or a loved one observes any of these behaviors or has concerns about self-harm, here's what you can do:  · Talk about it- your feelings and reasons for harming yourself  · Remove any means that you might use to hurt yourself (examples: pills, rope, extension cords, firearm)  · Get professional help from the community (Mental Health, Substance Abuse, psychological counseling)  · Do not be alone:Call your Safe Contact- someone whom you trust who will be there for you.  · Call your local CRISIS HOTLINE 556-6526 or 157-647-5023  · Call your local Children's Mobile Crisis Response Team [place] (983) 861-2205 or www.artandseek  · Call the toll free National Suicide Prevention Hotlines   · National Suicide Prevention Lifeline 085-673-OAMN (2283)  · National Hope Line Network 800-SUICIDE (080-8413)        DIAGNOSIS: 1.  severe hemorrhagic portal gastropathy. This could have bled in the setting of NSAID use  2.  Large esophageal varices with red nisa signs. 3 ligation bands were placed    Return to ER if recurrence of bleeding noted.

## 2019-09-30 LAB — AFP-TM SERPL-MCNC: 2 NG/ML (ref 0–9)

## 2019-12-11 ENCOUNTER — HOSPITAL ENCOUNTER (EMERGENCY)
Facility: MEDICAL CENTER | Age: 44
End: 2019-12-12
Attending: EMERGENCY MEDICINE
Payer: COMMERCIAL

## 2019-12-11 DIAGNOSIS — R04.0 EPISTAXIS: ICD-10-CM

## 2019-12-11 DIAGNOSIS — F10.10 CHRONIC ALCOHOL ABUSE: ICD-10-CM

## 2019-12-11 LAB
ANISOCYTOSIS BLD QL SMEAR: ABNORMAL
BASOPHILS # BLD AUTO: 1.1 % (ref 0–1.8)
BASOPHILS # BLD: 0.03 K/UL (ref 0–0.12)
COMMENT 1642: NORMAL
DACRYOCYTES BLD QL SMEAR: NORMAL
EOSINOPHIL # BLD AUTO: 0.02 K/UL (ref 0–0.51)
EOSINOPHIL NFR BLD: 0.7 % (ref 0–6.9)
ERYTHROCYTE [DISTWIDTH] IN BLOOD BY AUTOMATED COUNT: 45.7 FL (ref 35.9–50)
HCT VFR BLD AUTO: 32.1 % (ref 42–52)
HGB BLD-MCNC: 9.2 G/DL (ref 14–18)
IMM GRANULOCYTES # BLD AUTO: 0.01 K/UL (ref 0–0.11)
IMM GRANULOCYTES NFR BLD AUTO: 0.4 % (ref 0–0.9)
LYMPHOCYTES # BLD AUTO: 0.93 K/UL (ref 1–4.8)
LYMPHOCYTES NFR BLD: 34.6 % (ref 22–41)
MCH RBC QN AUTO: 19.3 PG (ref 27–33)
MCHC RBC AUTO-ENTMCNC: 28.7 G/DL (ref 33.7–35.3)
MCV RBC AUTO: 67.4 FL (ref 81.4–97.8)
MICROCYTES BLD QL SMEAR: ABNORMAL
MONOCYTES # BLD AUTO: 0.2 K/UL (ref 0–0.85)
MONOCYTES NFR BLD AUTO: 7.4 % (ref 0–13.4)
MORPHOLOGY BLD-IMP: NORMAL
NEUTROPHILS # BLD AUTO: 1.5 K/UL (ref 1.82–7.42)
NEUTROPHILS NFR BLD: 55.8 % (ref 44–72)
NRBC # BLD AUTO: 0 K/UL
NRBC BLD-RTO: 0 /100 WBC
OVALOCYTES BLD QL SMEAR: NORMAL
PLATELET # BLD AUTO: 61 K/UL (ref 164–446)
PLATELET BLD QL SMEAR: NORMAL
POIKILOCYTOSIS BLD QL SMEAR: NORMAL
RBC # BLD AUTO: 4.76 M/UL (ref 4.7–6.1)
RBC BLD AUTO: PRESENT
WBC # BLD AUTO: 2.7 K/UL (ref 4.8–10.8)

## 2019-12-11 PROCEDURE — 36415 COLL VENOUS BLD VENIPUNCTURE: CPT

## 2019-12-11 PROCEDURE — 85025 COMPLETE CBC W/AUTO DIFF WBC: CPT

## 2019-12-11 PROCEDURE — 99283 EMERGENCY DEPT VISIT LOW MDM: CPT

## 2019-12-11 PROCEDURE — 80053 COMPREHEN METABOLIC PANEL: CPT

## 2019-12-12 VITALS
OXYGEN SATURATION: 99 % | SYSTOLIC BLOOD PRESSURE: 119 MMHG | HEIGHT: 68 IN | WEIGHT: 222.22 LBS | DIASTOLIC BLOOD PRESSURE: 62 MMHG | BODY MASS INDEX: 33.68 KG/M2 | RESPIRATION RATE: 18 BRPM | TEMPERATURE: 97 F | HEART RATE: 90 BPM

## 2019-12-12 LAB
ALBUMIN SERPL BCP-MCNC: 3.7 G/DL (ref 3.2–4.9)
ALBUMIN/GLOB SERPL: 0.9 G/DL
ALP SERPL-CCNC: 68 U/L (ref 30–99)
ALT SERPL-CCNC: 17 U/L (ref 2–50)
ANION GAP SERPL CALC-SCNC: 13 MMOL/L (ref 0–11.9)
AST SERPL-CCNC: 36 U/L (ref 12–45)
BILIRUB SERPL-MCNC: 1 MG/DL (ref 0.1–1.5)
BUN SERPL-MCNC: 6 MG/DL (ref 8–22)
CALCIUM SERPL-MCNC: 8.6 MG/DL (ref 8.5–10.5)
CHLORIDE SERPL-SCNC: 106 MMOL/L (ref 96–112)
CO2 SERPL-SCNC: 19 MMOL/L (ref 20–33)
CREAT SERPL-MCNC: 0.58 MG/DL (ref 0.5–1.4)
GLOBULIN SER CALC-MCNC: 3.9 G/DL (ref 1.9–3.5)
GLUCOSE SERPL-MCNC: 80 MG/DL (ref 65–99)
POTASSIUM SERPL-SCNC: 3.8 MMOL/L (ref 3.6–5.5)
PROT SERPL-MCNC: 7.6 G/DL (ref 6–8.2)
SODIUM SERPL-SCNC: 138 MMOL/L (ref 135–145)

## 2019-12-12 NOTE — ED NOTES
.Pt given d/c instructions. Pt able to illustrate understanding as evidence by verbal feed-back of information given. Pt is stable for d/c at present time. Pt has no further concerns at present time. Pt able to ambulate.

## 2019-12-12 NOTE — ED NOTES
Patient roomed, ambulated steady gait, family at bedside, agree with triage note.  On monitor in NAD.

## 2019-12-12 NOTE — ED TRIAGE NOTES
"Chief Complaint   Patient presents with   • Blood in Sputum   • Diarrhea     Pt ambulatory to triage for above complaint. Pt was seen here about 2 months ago for GI bleed, pt comes here to rule out similar. Pt denies any vomiting or blood in stool. Pt reports feeling weak. Symptoms started today. Pt also reports urinary frequency.   Pt educated on triage process and placed in lobby.     /83   Pulse (!) 106   Temp 37 °C (98.6 °F) (Oral)   Resp 16   Ht 1.727 m (5' 8\")   Wt 100.8 kg (222 lb 3.6 oz)   SpO2 99%   BMI 33.79 kg/m²     "

## 2019-12-12 NOTE — ED PROVIDER NOTES
ED Provider Note    Scribed for Doron Kelley M.D. by Margarita Pratt. 12/11/2019  10:42 PM    Primary care provider: Tomy Wilde M.D.  Means of arrival: Walk-in  History obtained from: Patient  History limited by: None    CHIEF COMPLAINT  Chief Complaint   Patient presents with   • Blood in Sputum   • Diarrhea       HPI  Jimbo Santacruz is a 44 y.o. male who presents to the Emergency Department for blood in sputum onset today. The patient states while brushing his teeth this morning he noticed blood in his mouth. The patient states he often has epistaxis, but denies vomiting, hematuria, or hematochezia. The patient's brother reports that he is concerned his brother has been drinking too much, and that it is causing is health issues. The patient has been directed by a physician to quit drinking. The patient states he frequently consumes alcohol.  Most recently 2 days ago.    REVIEW OF SYSTEMS  Pertinent positives include blood in sputum and epistaxis. Pertinent negatives include no bloody stool or black stool.  No vomiting.  All other systems reviewed and negative.    PAST MEDICAL HISTORY   has a past medical history of Anemia, ETOH abuse, GI bleed, Liver disease, and Pancreatitis.    SURGICAL HISTORY   has a past surgical history that includes hernia repair; other; gastroscopy-endo (N/A, 4/13/2019); and gastroscopy-endo (9/27/2019).    SOCIAL HISTORY  Social History     Tobacco Use   • Smoking status: Never Smoker   • Smokeless tobacco: Never Used   Substance Use Topics   • Alcohol use: Yes     Frequency: 4 or more times a week     Drinks per session: 5 or 6     Binge frequency: Daily or almost daily     Comment: occ   • Drug use: No      Social History     Substance and Sexual Activity   Drug Use No       FAMILY HISTORY  No family history on file.    CURRENT MEDICATIONS  Home Medications     Reviewed by Senait Quiroz R.N. (Registered Nurse) on 12/11/19 at 1935  Med List Status: Partial   Medication Last Dose Status  "  omeprazole (PRILOSEC) 40 MG delayed-release capsule  Active   propranolol (INDERAL) 10 MG Tab  Active                ALLERGIES  No Known Allergies    PHYSICAL EXAM  VITAL SIGNS: /80   Pulse 96   Temp 36.1 °C (97 °F) (Temporal)   Resp 20   Ht 1.727 m (5' 8\")   Wt 100.8 kg (222 lb 3.6 oz)   SpO2 99%   BMI 33.79 kg/m²   Pulse ox interpretation: Normal  Constitutional: Well developed, Well nourished, No acute distress, Non-toxic appearance.   HENT: Dry blood in bilateral nostrils, No blood posterior pharynx, Normocephalic, Atraumatic, Bilateral external ears normal, Oropharynx moist, No oral exudates.  Eyes: PERRLA, EOMI, Conjunctiva normal, No discharge.   Cardiovascular: Normal heart rate, Normal rhythm, No murmurs, No rubs, No gallops.   Thorax & Lungs: Normal breath sounds, No respiratory distress  Abdomen: Bowel sounds normal, Soft, No tenderness, No masses, No pulsatile masses.   Skin: Warm, Dry, No erythema, No rash.   Back: No tenderness, No CVA tenderness.   Extremities: Intact distal pulses, No edema, No tenderness, No cyanosis, No clubbing.   Neurologic: Alert, No focal deficits noted.       LABS  Labs Reviewed   CBC WITH DIFFERENTIAL - Abnormal; Notable for the following components:       Result Value    WBC 2.7 (*)     Hemoglobin 9.2 (*)     Hematocrit 32.1 (*)     MCV 67.4 (*)     MCH 19.3 (*)     MCHC 28.7 (*)     Platelet Count 61 (*)     Neutrophils (Absolute) 1.50 (*)     Lymphs (Absolute) 0.93 (*)     All other components within normal limits   COMP METABOLIC PANEL - Abnormal; Notable for the following components:    Co2 19 (*)     Anion Gap 13.0 (*)     Bun 6 (*)     Globulin 3.9 (*)     All other components within normal limits   MORPHOLOGY   PERIPHERAL SMEAR REVIEW   PLATELET ESTIMATE   DIFFERENTIAL COMMENT   ESTIMATED GFR     All labs reviewed by me.      COURSE & MEDICAL DECISION MAKING  Pertinent Labs & Imaging studies reviewed. (See chart for details)    10:42 PM - Patient seen " "and examined at bedside.     Decision Making:  This is a 44 y.o. year old male who presents with reports of blood in his nostrils that was detected earlier this morning when he was brushing his teeth.  He is presenting to the emergency department at the insistence of his brother.  The patient does have a known history of GI bleed however denies any bloody stool or black stool.  No vomiting or hematemesis.  He does have dried blood in bilateral nares.  Suspect that the patient had a bloody nose while sleeping last night however there is no active epistaxis at this time.  His hemoglobin is stable compared to prior studies.  He does have slightly low platelets however not critically low requiring transfusion.  This may increase his propensity to bleed.  No significant liver enzyme abnormalities.  Suspect that the patient has low platelets as a result of his chronic alcohol abuse.  He was encouraged to abstain from continued alcohol abuse.    The patient appears stable for urge.  No active symptoms at this time.  Hemodynamically stable.  No signs of active bleeding.    /62   Pulse 90   Temp 36.1 °C (97 °F) (Temporal)   Resp 18   Ht 1.727 m (5' 8\")   Wt 100.8 kg (222 lb 3.6 oz)   SpO2 99%   BMI 33.79 kg/m²       Tomy Wilde M.D.  [address] [place] [id number] [phone]    Schedule an appointment as soon as possible for a visit       [facility], Emergency Dept  [address] [facility] [address] [phone]    As needed, If symptoms worsen      FINAL IMPRESSION  1. Epistaxis    2. Chronic alcohol abuse         This dictation has been created using voice recognition software and/or scribes. The accuracy of the dictation is limited by the abilities of the software and the expertise of the scribes. I expect there may be some errors of grammar and possibly content. I made every attempt to manually correct the errors within my dictation. However, errors related to voice " recognition software and/or scribes may still exist and should be interpreted within the appropriate context.    The note accurately reflects work and decisions made by me.  Doron Kelley  12/12/2019  12:46 AM

## 2020-10-19 ENCOUNTER — APPOINTMENT (OUTPATIENT)
Dept: RADIOLOGY | Facility: MEDICAL CENTER | Age: 45
DRG: 432 | End: 2020-10-19
Attending: EMERGENCY MEDICINE
Payer: COMMERCIAL

## 2020-10-19 ENCOUNTER — APPOINTMENT (OUTPATIENT)
Dept: RADIOLOGY | Facility: MEDICAL CENTER | Age: 45
DRG: 432 | End: 2020-10-19
Attending: STUDENT IN AN ORGANIZED HEALTH CARE EDUCATION/TRAINING PROGRAM
Payer: COMMERCIAL

## 2020-10-19 ENCOUNTER — HOSPITAL ENCOUNTER (INPATIENT)
Facility: MEDICAL CENTER | Age: 45
LOS: 9 days | DRG: 432 | End: 2020-10-29
Attending: EMERGENCY MEDICINE | Admitting: INTERNAL MEDICINE
Payer: COMMERCIAL

## 2020-10-19 DIAGNOSIS — D69.6 THROMBOCYTOPENIA (HCC): ICD-10-CM

## 2020-10-19 DIAGNOSIS — R14.0 ABDOMINAL DISTENTION: ICD-10-CM

## 2020-10-19 DIAGNOSIS — D64.9 ANEMIA, UNSPECIFIED TYPE: ICD-10-CM

## 2020-10-19 DIAGNOSIS — K70.31 ALCOHOLIC CIRRHOSIS OF LIVER WITH ASCITES (HCC): ICD-10-CM

## 2020-10-19 DIAGNOSIS — R10.9 RIGHT SIDED ABDOMINAL PAIN: ICD-10-CM

## 2020-10-19 DIAGNOSIS — E80.6 HYPERBILIRUBINEMIA: ICD-10-CM

## 2020-10-19 DIAGNOSIS — R74.8 ELEVATED LIPASE: ICD-10-CM

## 2020-10-19 PROBLEM — R18.8 ASCITES: Status: ACTIVE | Noted: 2020-10-19

## 2020-10-19 PROBLEM — K52.9 ENTERITIS: Status: ACTIVE | Noted: 2020-10-19

## 2020-10-19 LAB
ALBUMIN FLD-MCNC: 0.4 G/DL
ALBUMIN SERPL BCP-MCNC: 3.1 G/DL (ref 3.2–4.9)
ALBUMIN/GLOB SERPL: 0.7 G/DL
ALP SERPL-CCNC: 139 U/L (ref 30–99)
ALT SERPL-CCNC: 27 U/L (ref 2–50)
AMMONIA PLAS-SCNC: 54 UMOL/L (ref 11–45)
ANION GAP SERPL CALC-SCNC: 14 MMOL/L (ref 7–16)
APPEARANCE FLD: CLEAR
AST SERPL-CCNC: 75 U/L (ref 12–45)
BASOPHILS # BLD AUTO: 0.8 % (ref 0–1.8)
BASOPHILS # BLD: 0.05 K/UL (ref 0–0.12)
BILIRUB SERPL-MCNC: 2 MG/DL (ref 0.1–1.5)
BODY FLD TYPE: NORMAL
BUN SERPL-MCNC: 10 MG/DL (ref 8–22)
CA-I SERPL-SCNC: 1 MMOL/L (ref 1.1–1.3)
CALCIUM SERPL-MCNC: 7.9 MG/DL (ref 8.5–10.5)
CHLORIDE SERPL-SCNC: 102 MMOL/L (ref 96–112)
CO2 SERPL-SCNC: 18 MMOL/L (ref 20–33)
COLOR FLD: YELLOW
COVID ORDER STATUS COVID19: NORMAL
CREAT SERPL-MCNC: 0.5 MG/DL (ref 0.5–1.4)
CSF COMMENTS 1658: NORMAL
EOSINOPHIL # BLD AUTO: 0.03 K/UL (ref 0–0.51)
EOSINOPHIL NFR BLD: 0.5 % (ref 0–6.9)
ERYTHROCYTE [DISTWIDTH] IN BLOOD BY AUTOMATED COUNT: 47.7 FL (ref 35.9–50)
ETHANOL BLD-MCNC: 366.7 MG/DL (ref 0–10)
GLOBULIN SER CALC-MCNC: 4.7 G/DL (ref 1.9–3.5)
GLUCOSE SERPL-MCNC: 104 MG/DL (ref 65–99)
GRAM STN SPEC: NORMAL
HCT VFR BLD AUTO: 39.3 % (ref 42–52)
HGB BLD-MCNC: 12.9 G/DL (ref 14–18)
HISTIOCYTES NFR FLD: 3 %
IMM GRANULOCYTES # BLD AUTO: 0.02 K/UL (ref 0–0.11)
IMM GRANULOCYTES NFR BLD AUTO: 0.3 % (ref 0–0.9)
INR PPP: 1.57 (ref 0.87–1.13)
LIPASE SERPL-CCNC: 97 U/L (ref 11–82)
LYMPHOCYTES # BLD AUTO: 1.44 K/UL (ref 1–4.8)
LYMPHOCYTES NFR BLD: 23.1 % (ref 22–41)
LYMPHOCYTES NFR FLD: 66 %
MAGNESIUM SERPL-MCNC: 1.6 MG/DL (ref 1.5–2.5)
MCH RBC QN AUTO: 25.5 PG (ref 27–33)
MCHC RBC AUTO-ENTMCNC: 32.8 G/DL (ref 33.7–35.3)
MCV RBC AUTO: 77.7 FL (ref 81.4–97.8)
MESOTHL CELL NFR FLD: 10 %
MONOCYTES # BLD AUTO: 0.23 K/UL (ref 0–0.85)
MONOCYTES NFR BLD AUTO: 3.7 % (ref 0–13.4)
MONONUC CELLS NFR FLD: 9 %
NEUTROPHILS # BLD AUTO: 4.46 K/UL (ref 1.82–7.42)
NEUTROPHILS NFR BLD: 71.6 % (ref 44–72)
NEUTROPHILS NFR FLD: 12 %
NRBC # BLD AUTO: 0 K/UL
NRBC BLD-RTO: 0 /100 WBC
PHOSPHATE SERPL-MCNC: 3.7 MG/DL (ref 2.5–4.5)
PLATELET # BLD AUTO: 98 K/UL (ref 164–446)
PMV BLD AUTO: 9 FL (ref 9–12.9)
POTASSIUM SERPL-SCNC: 4.8 MMOL/L (ref 3.6–5.5)
PROCALCITONIN SERPL-MCNC: 0.06 NG/ML
PROT FLD-MCNC: 1.1 G/DL
PROT SERPL-MCNC: 7.8 G/DL (ref 6–8.2)
PROTHROMBIN TIME: 19.3 SEC (ref 12–14.6)
RBC # BLD AUTO: 5.06 M/UL (ref 4.7–6.1)
RBC # FLD: 1000 CELLS/UL
SARS-COV-2 RNA RESP QL NAA+PROBE: NOTDETECTED
SIGNIFICANT IND 70042: NORMAL
SITE SITE: NORMAL
SODIUM SERPL-SCNC: 134 MMOL/L (ref 135–145)
SOURCE SOURCE: NORMAL
SPECIMEN SOURCE: NORMAL
TROPONIN T SERPL-MCNC: 10 NG/L (ref 6–19)
WBC # BLD AUTO: 6.2 K/UL (ref 4.8–10.8)
WBC # FLD: 126 CELLS/UL

## 2020-10-19 PROCEDURE — 80053 COMPREHEN METABOLIC PANEL: CPT

## 2020-10-19 PROCEDURE — 96365 THER/PROPH/DIAG IV INF INIT: CPT

## 2020-10-19 PROCEDURE — 0W9G3ZX DRAINAGE OF PERITONEAL CAVITY, PERCUTANEOUS APPROACH, DIAGNOSTIC: ICD-10-PCS | Performed by: RADIOLOGY

## 2020-10-19 PROCEDURE — 700117 HCHG RX CONTRAST REV CODE 255: Performed by: EMERGENCY MEDICINE

## 2020-10-19 PROCEDURE — 49083 ABD PARACENTESIS W/IMAGING: CPT

## 2020-10-19 PROCEDURE — 84100 ASSAY OF PHOSPHORUS: CPT

## 2020-10-19 PROCEDURE — 83690 ASSAY OF LIPASE: CPT

## 2020-10-19 PROCEDURE — 84484 ASSAY OF TROPONIN QUANT: CPT

## 2020-10-19 PROCEDURE — 99220 PR INITIAL OBSERVATION CARE,LEVL III: CPT | Performed by: STUDENT IN AN ORGANIZED HEALTH CARE EDUCATION/TRAINING PROGRAM

## 2020-10-19 PROCEDURE — HZ2ZZZZ DETOXIFICATION SERVICES FOR SUBSTANCE ABUSE TREATMENT: ICD-10-PCS | Performed by: INTERNAL MEDICINE

## 2020-10-19 PROCEDURE — 700102 HCHG RX REV CODE 250 W/ 637 OVERRIDE(OP): Performed by: STUDENT IN AN ORGANIZED HEALTH CARE EDUCATION/TRAINING PROGRAM

## 2020-10-19 PROCEDURE — 83735 ASSAY OF MAGNESIUM: CPT

## 2020-10-19 PROCEDURE — C9803 HOPD COVID-19 SPEC COLLECT: HCPCS | Performed by: EMERGENCY MEDICINE

## 2020-10-19 PROCEDURE — 85025 COMPLETE CBC W/AUTO DIFF WBC: CPT

## 2020-10-19 PROCEDURE — 87077 CULTURE AEROBIC IDENTIFY: CPT

## 2020-10-19 PROCEDURE — G0378 HOSPITAL OBSERVATION PER HR: HCPCS

## 2020-10-19 PROCEDURE — 82042 OTHER SOURCE ALBUMIN QUAN EA: CPT

## 2020-10-19 PROCEDURE — 80307 DRUG TEST PRSMV CHEM ANLYZR: CPT

## 2020-10-19 PROCEDURE — 84157 ASSAY OF PROTEIN OTHER: CPT

## 2020-10-19 PROCEDURE — 700111 HCHG RX REV CODE 636 W/ 250 OVERRIDE (IP): Performed by: STUDENT IN AN ORGANIZED HEALTH CARE EDUCATION/TRAINING PROGRAM

## 2020-10-19 PROCEDURE — 74177 CT ABD & PELVIS W/CONTRAST: CPT

## 2020-10-19 PROCEDURE — 82140 ASSAY OF AMMONIA: CPT

## 2020-10-19 PROCEDURE — 82330 ASSAY OF CALCIUM: CPT

## 2020-10-19 PROCEDURE — 700105 HCHG RX REV CODE 258: Performed by: STUDENT IN AN ORGANIZED HEALTH CARE EDUCATION/TRAINING PROGRAM

## 2020-10-19 PROCEDURE — 87040 BLOOD CULTURE FOR BACTERIA: CPT | Mod: 91

## 2020-10-19 PROCEDURE — 700101 HCHG RX REV CODE 250: Performed by: STUDENT IN AN ORGANIZED HEALTH CARE EDUCATION/TRAINING PROGRAM

## 2020-10-19 PROCEDURE — 87150 DNA/RNA AMPLIFIED PROBE: CPT | Mod: 91

## 2020-10-19 PROCEDURE — 87205 SMEAR GRAM STAIN: CPT

## 2020-10-19 PROCEDURE — U0003 INFECTIOUS AGENT DETECTION BY NUCLEIC ACID (DNA OR RNA); SEVERE ACUTE RESPIRATORY SYNDROME CORONAVIRUS 2 (SARS-COV-2) (CORONAVIRUS DISEASE [COVID-19]), AMPLIFIED PROBE TECHNIQUE, MAKING USE OF HIGH THROUGHPUT TECHNOLOGIES AS DESCRIBED BY CMS-2020-01-R: HCPCS

## 2020-10-19 PROCEDURE — 96366 THER/PROPH/DIAG IV INF ADDON: CPT

## 2020-10-19 PROCEDURE — 87070 CULTURE OTHR SPECIMN AEROBIC: CPT

## 2020-10-19 PROCEDURE — A9270 NON-COVERED ITEM OR SERVICE: HCPCS | Performed by: STUDENT IN AN ORGANIZED HEALTH CARE EDUCATION/TRAINING PROGRAM

## 2020-10-19 PROCEDURE — 93005 ELECTROCARDIOGRAM TRACING: CPT | Performed by: EMERGENCY MEDICINE

## 2020-10-19 PROCEDURE — 87015 SPECIMEN INFECT AGNT CONCNTJ: CPT

## 2020-10-19 PROCEDURE — 87186 SC STD MICRODIL/AGAR DIL: CPT

## 2020-10-19 PROCEDURE — 84145 PROCALCITONIN (PCT): CPT

## 2020-10-19 PROCEDURE — 71045 X-RAY EXAM CHEST 1 VIEW: CPT

## 2020-10-19 PROCEDURE — 89051 BODY FLUID CELL COUNT: CPT

## 2020-10-19 PROCEDURE — 99285 EMERGENCY DEPT VISIT HI MDM: CPT

## 2020-10-19 PROCEDURE — 96375 TX/PRO/DX INJ NEW DRUG ADDON: CPT

## 2020-10-19 PROCEDURE — 85610 PROTHROMBIN TIME: CPT

## 2020-10-19 RX ORDER — POLYETHYLENE GLYCOL 3350 17 G/17G
1 POWDER, FOR SOLUTION ORAL
Status: DISCONTINUED | OUTPATIENT
Start: 2020-10-19 | End: 2020-10-29 | Stop reason: HOSPADM

## 2020-10-19 RX ORDER — BISACODYL 10 MG
10 SUPPOSITORY, RECTAL RECTAL
Status: DISCONTINUED | OUTPATIENT
Start: 2020-10-19 | End: 2020-10-29 | Stop reason: HOSPADM

## 2020-10-19 RX ORDER — OMEPRAZOLE 20 MG/1
40 CAPSULE, DELAYED RELEASE ORAL 2 TIMES DAILY
Status: DISCONTINUED | OUTPATIENT
Start: 2020-10-19 | End: 2020-10-29 | Stop reason: HOSPADM

## 2020-10-19 RX ORDER — FOLIC ACID 1 MG/1
1 TABLET ORAL DAILY
Status: COMPLETED | OUTPATIENT
Start: 2020-10-20 | End: 2020-10-23

## 2020-10-19 RX ORDER — PROPRANOLOL HYDROCHLORIDE 10 MG/1
10 TABLET ORAL EVERY 8 HOURS
Status: DISCONTINUED | OUTPATIENT
Start: 2020-10-19 | End: 2020-10-26

## 2020-10-19 RX ORDER — PROMETHAZINE HYDROCHLORIDE 25 MG/1
12.5-25 TABLET ORAL EVERY 4 HOURS PRN
Status: DISCONTINUED | OUTPATIENT
Start: 2020-10-19 | End: 2020-10-29 | Stop reason: HOSPADM

## 2020-10-19 RX ORDER — PROCHLORPERAZINE EDISYLATE 5 MG/ML
5-10 INJECTION INTRAMUSCULAR; INTRAVENOUS EVERY 4 HOURS PRN
Status: DISCONTINUED | OUTPATIENT
Start: 2020-10-19 | End: 2020-10-29 | Stop reason: HOSPADM

## 2020-10-19 RX ORDER — AMOXICILLIN 250 MG
2 CAPSULE ORAL 2 TIMES DAILY
Status: DISCONTINUED | OUTPATIENT
Start: 2020-10-19 | End: 2020-10-29 | Stop reason: HOSPADM

## 2020-10-19 RX ORDER — HEPARIN SODIUM 5000 [USP'U]/ML
5000 INJECTION, SOLUTION INTRAVENOUS; SUBCUTANEOUS EVERY 8 HOURS
Status: DISCONTINUED | OUTPATIENT
Start: 2020-10-19 | End: 2020-10-19

## 2020-10-19 RX ORDER — PROMETHAZINE HYDROCHLORIDE 25 MG/1
12.5-25 SUPPOSITORY RECTAL EVERY 4 HOURS PRN
Status: DISCONTINUED | OUTPATIENT
Start: 2020-10-19 | End: 2020-10-29 | Stop reason: HOSPADM

## 2020-10-19 RX ORDER — THIAMINE MONONITRATE (VIT B1) 100 MG
100 TABLET ORAL DAILY
Status: COMPLETED | OUTPATIENT
Start: 2020-10-20 | End: 2020-10-23

## 2020-10-19 RX ORDER — ONDANSETRON 4 MG/1
4 TABLET, ORALLY DISINTEGRATING ORAL EVERY 4 HOURS PRN
Status: DISCONTINUED | OUTPATIENT
Start: 2020-10-19 | End: 2020-10-29 | Stop reason: HOSPADM

## 2020-10-19 RX ORDER — ONDANSETRON 2 MG/ML
4 INJECTION INTRAMUSCULAR; INTRAVENOUS EVERY 4 HOURS PRN
Status: DISCONTINUED | OUTPATIENT
Start: 2020-10-19 | End: 2020-10-29 | Stop reason: HOSPADM

## 2020-10-19 RX ORDER — MORPHINE SULFATE 4 MG/ML
2 INJECTION, SOLUTION INTRAMUSCULAR; INTRAVENOUS EVERY 4 HOURS PRN
Status: DISCONTINUED | OUTPATIENT
Start: 2020-10-19 | End: 2020-10-29 | Stop reason: HOSPADM

## 2020-10-19 RX ADMIN — PROPRANOLOL HYDROCHLORIDE 10 MG: 10 TABLET ORAL at 21:09

## 2020-10-19 RX ADMIN — PROPRANOLOL HYDROCHLORIDE 10 MG: 10 TABLET ORAL at 07:23

## 2020-10-19 RX ADMIN — VANCOMYCIN HYDROCHLORIDE 2000 MG: 500 INJECTION, POWDER, LYOPHILIZED, FOR SOLUTION INTRAVENOUS at 23:51

## 2020-10-19 RX ADMIN — ONDANSETRON 4 MG: 2 INJECTION INTRAMUSCULAR; INTRAVENOUS at 15:44

## 2020-10-19 RX ADMIN — PROMETHAZINE HYDROCHLORIDE 25 MG: 25 TABLET ORAL at 21:59

## 2020-10-19 RX ADMIN — PROPRANOLOL HYDROCHLORIDE 10 MG: 10 TABLET ORAL at 14:07

## 2020-10-19 RX ADMIN — IOHEXOL 100 ML: 350 INJECTION, SOLUTION INTRAVENOUS at 02:45

## 2020-10-19 RX ADMIN — OMEPRAZOLE 40 MG: 20 CAPSULE, DELAYED RELEASE ORAL at 07:23

## 2020-10-19 RX ADMIN — ONDANSETRON 4 MG: 2 INJECTION INTRAMUSCULAR; INTRAVENOUS at 21:13

## 2020-10-19 RX ADMIN — MORPHINE SULFATE 2 MG: 4 INJECTION INTRAVENOUS at 07:33

## 2020-10-19 RX ADMIN — THIAMINE HYDROCHLORIDE: 100 INJECTION, SOLUTION INTRAMUSCULAR; INTRAVENOUS at 07:23

## 2020-10-19 RX ADMIN — OMEPRAZOLE 40 MG: 20 CAPSULE, DELAYED RELEASE ORAL at 17:21

## 2020-10-19 ASSESSMENT — LIFESTYLE VARIABLES
CONSUMPTION TOTAL: INCOMPLETE
TOTAL SCORE: 0
TOTAL SCORE: 1
ALCOHOL_USE: YES
EVER HAD A DRINK FIRST THING IN THE MORNING TO STEADY YOUR NERVES TO GET RID OF A HANGOVER: NO
TOTAL SCORE: 1
DO YOU DRINK ALCOHOL: YES
HAVE PEOPLE ANNOYED YOU BY CRITICIZING YOUR DRINKING: NO
EVER FELT BAD OR GUILTY ABOUT YOUR DRINKING: NO
CONSUMPTION TOTAL: NEGATIVE
ON A TYPICAL DAY WHEN YOU DRINK ALCOHOL HOW MANY DRINKS DO YOU HAVE: 3
HOW MANY TIMES IN THE PAST YEAR HAVE YOU HAD 5 OR MORE DRINKS IN A DAY: 0
DOES PATIENT WANT TO STOP DRINKING: YES
HAVE PEOPLE ANNOYED YOU BY CRITICIZING YOUR DRINKING: NO
TOTAL SCORE: 0
HAVE YOU EVER FELT YOU SHOULD CUT DOWN ON YOUR DRINKING: NO
EVER FELT BAD OR GUILTY ABOUT YOUR DRINKING: YES
TOTAL SCORE: 1
EVER HAD A DRINK FIRST THING IN THE MORNING TO STEADY YOUR NERVES TO GET RID OF A HANGOVER: NO
AVERAGE NUMBER OF DAYS PER WEEK YOU HAVE A DRINK CONTAINING ALCOHOL: 3
TOTAL SCORE: 0
HAVE YOU EVER FELT YOU SHOULD CUT DOWN ON YOUR DRINKING: NO

## 2020-10-19 ASSESSMENT — COGNITIVE AND FUNCTIONAL STATUS - GENERAL
DAILY ACTIVITIY SCORE: 24
SUGGESTED CMS G CODE MODIFIER MOBILITY: CH
MOBILITY SCORE: 24
SUGGESTED CMS G CODE MODIFIER DAILY ACTIVITY: CH

## 2020-10-19 ASSESSMENT — PAIN DESCRIPTION - PAIN TYPE
TYPE: ACUTE PAIN
TYPE: ACUTE PAIN

## 2020-10-19 ASSESSMENT — ENCOUNTER SYMPTOMS
MYALGIAS: 0
CONSTIPATION: 0
PALPITATIONS: 0
HEMOPTYSIS: 0
SHORTNESS OF BREATH: 1
DEPRESSION: 0
BLURRED VISION: 0
BLOOD IN STOOL: 0
DIARRHEA: 0
ORTHOPNEA: 0
ABDOMINAL PAIN: 1
EYE PAIN: 0
DIZZINESS: 0
FEVER: 0
WHEEZING: 0
WEAKNESS: 0
WEIGHT LOSS: 0
VOMITING: 0
SORE THROAT: 0
COUGH: 0
CHILLS: 0
LOSS OF CONSCIOUSNESS: 0
NAUSEA: 0

## 2020-10-19 ASSESSMENT — PATIENT HEALTH QUESTIONNAIRE - PHQ9
2. FEELING DOWN, DEPRESSED, IRRITABLE, OR HOPELESS: NOT AT ALL
SUM OF ALL RESPONSES TO PHQ9 QUESTIONS 1 AND 2: 0
1. LITTLE INTEREST OR PLEASURE IN DOING THINGS: NOT AT ALL

## 2020-10-19 ASSESSMENT — FIBROSIS 4 INDEX
FIB4 SCORE: 6.33
FIB4 SCORE: 6.15

## 2020-10-19 NOTE — PROGRESS NOTES
· 2 RN skin check complete. Sim  · Devices in place PIV.  · Skin assessed under devices Yes.  · Confirmed pressure ulcers found on no.  · New potential pressure ulcers noted on N/A. Wound consult placed? N/A. Photo uploaded? N/A.   · The following interventions are in place patient turns from side to side, patient educated on the importance of turning side to side, patient verbalizes understanding.      Patient's feet bilaterally are cracked, flaky, and dry with a small scab on the L heel. Behind the patients left ear there is a small blanching red area.

## 2020-10-19 NOTE — PROGRESS NOTES
Patient seen and examined at bedside.    45-year-old male with past medical history of cirrhosis and anemia who presented 10/19/2020 for abdominal pain with worsening ascites.  Patient normally gets paracentesis every 6 to 9 months.  CT abdomen noted.  Patient's last EGD was done 9/29/2019 patient found to have large esophageal varices that were banded. Patient reports that his abdominal pain has improved after paracentesis, abdomen now soft and nontender on exam.     Patient went for IR paracentesis today.  Fluid studies pending.  Continue propanolol    Continue supportive care for enteritis.  CIWA started for potential alcohol abuse.

## 2020-10-19 NOTE — LETTER
October 28, 2020       Patient: Jimbo Santacruz   YOB: 1975   Date of Visit: 10/19/2020         To Whom It May Concern:    In my medical opinion, I recommend that Jimbo Santacruz remain out of work until November 1, 2020.  Mr. Santacruz can return to work on Monday November 2, 2020 and return to full duty, no restrictions.      If you have any questions or concerns, please don't hesitate to call            Sincerely,          MARRY HUSTON MD  Electronically Signed

## 2020-10-19 NOTE — LETTER
October 22, 2020        Jimbo Santacruz  [address] [facility] [address]      To whom it may concern:    Jimbo Santacruz was admitted to our hospital on 10/20/2020 and is currently still admitted.     We kindly ask that you excuse Mr. Santacruz from any obligations or responsibilities made prior to this acute medical event and take into consideration any follow-up care and treatment.    If you have any questions, please do not hesitate to contact Care Coordination at the number listed below. Thank you for assisting Mr. Santacruz during this time.      Sincerely,          SERGEY Valles MSW  Social Work Care Coordinator II  [phone]  Electronically Signed

## 2020-10-19 NOTE — ASSESSMENT & PLAN NOTE
Pt with alcohol cirrhosis  Denies routine drinking, but admits he drank 2-3 shots on day of admission  Alcohol level 366  - s/p CIWA monitoring  - Multivitamin and RALLY Bag  - Seizure/Fall precautions  - Alcohol cessation counseling performed on this admission

## 2020-10-19 NOTE — PROGRESS NOTES
Dr. Ardon consented patient and performed Paracentesis in U/S room 2 .  Vitals monitored during procedure and documented in EPIC.4000ml of fluid removed,  1000 ml of fluid sent to lab.  Patient tolerated procedure well.  Report given to Fatuma Serna RN.

## 2020-10-19 NOTE — ED PROVIDER NOTES
"ED Provider Note    CHIEF COMPLAINT  Chief Complaint   Patient presents with   • Abdominal Pain        \[facility]\""    Primary care provider: Tomy Wilde M.D.   History obtained from: Patient  History limited by: None     Jimbo Santacruz is a 45 y.o. male who presents to the ED by care flight from [place].  Patient states that he has been having right-sided abdominal pain described as \"a prick\" since 3:00 this afternoon.  Patient with history of cirrhosis due to alcohol abuse.  He reports that he had 1 shot of alcohol today around 3:00 this afternoon.  He also reports abdominal distention and last had a paracentesis about 6-9 months ago.  He denies fever/congestion/sore throat/cough/change in taste or smell/nausea/vomiting/diarrhea/constipation/hematochezia/melena/hematochezia/rash.  He has not noticed any other areas of swelling except for his abdomen.  He reports feeling short of breath at times.  He also reports that he has some burning with urination at times.  He denies recent travels or known ill contacts.  He received fentanyl by EMS.    REVIEW OF SYSTEMS  Please see HPI for pertinent positives/negatives.  All other systems reviewed and are negative.     PAST MEDICAL HISTORY  Past Medical History:   Diagnosis Date   • Anemia    • Cirrhosis (HCC)    • ETOH abuse    • GI bleed    • Liver disease    • Pancreatitis         SURGICAL HISTORY  Past Surgical History:   Procedure Laterality Date   • GASTROSCOPY-ENDO  9/27/2019    Procedure: GASTROSCOPY;  Surgeon: Roberto Hankins M.D.;  [facility]OS[facility];  Service: Gastroenterology   • GASTROSCOPY-ENDO N/A 4/13/2019    Procedure: GASTROSCOPY with Banding;  Surgeon: Jose Andrade D.O.;  Location: SURGERY [facility];  Service: Gastroenterology   • HERNIA REPAIR     • OTHER      right leg fx repair        SOCIAL HISTORY  Social History     Tobacco Use   • Smoking status: Never Smoker   • Smokeless tobacco: Never Used   Substance and Sexual Activity   • " "Alcohol use: Yes     Frequency: 4 or more times a week     Drinks per session: 5 or 6     Binge frequency: Daily or almost daily     Comment: occ   • Drug use: No   • Sexual activity: Not on file        FAMILY HISTORY  No family history on file.     CURRENT MEDICATIONS  Home Medications    **Home medications have not yet been reviewed for this encounter**          ALLERGIES  No Known Allergies     PHYSICAL EXAM  VITAL SIGNS: /76   Pulse 95   Temp 36.3 °C (97.3 °F) (Temporal)   Resp 16   Ht 1.727 m (5' 8\")   Wt 84.4 kg (186 lb)   SpO2 99%   BMI 28.28 kg/m²  @RYAN[[id number]::@     Pulse ox interpretation: 96% I interpret this pulse ox as normal     Cardiac monitor interpretation: Sinus tachycardia with heart rate in the 100s as interpreted by me.  The patient presented with dyspnea and tachycardia and cardiac monitor was ordered to monitor for dysrhythmia.    Constitutional: Well developed, well nourished, alert in no apparent distress, nontoxic appearance    HENT: No external signs of trauma, normocephalic, mask on due to COVID-19 pandemic  Eyes: PERRL, conjunctiva without erythema, no discharge, no icterus    Neck: Soft and supple, trachea midline, no stridor, no tenderness, no LAD, no JVD, good ROM    Cardiovascular: Tachycardia, no murmurs/rubs/gallops, strong distal pulses and good perfusion    Thorax & Lungs: No respiratory distress, CTAB   Abdomen: Soft, mild diffuse distention with mild right-sided tenderness to palpation, no guarding, no rebound, normal BS    Back: No CVAT    Extremities: No cyanosis, no edema, no gross deformity, good ROM, no tenderness, intact distal pulses with brisk cap refill    Skin: Warm, dry, no pallor/cyanosis, no rash noted    Lymphatic: No lymphadenopathy noted    Neuro: A/O times 3, no focal deficits noted    Psychiatric: Cooperative, flat affect, normal judgement      DIAGNOSTIC STUDIES / PROCEDURES        LABS  All labs reviewed by me.     Results for orders placed or " performed during the hospital encounter of 10/19/20   CBC WITH DIFFERENTIAL   Result Value Ref Range    WBC 6.2 4.8 - 10.8 K/uL    RBC 5.06 4.70 - 6.10 M/uL    Hemoglobin 12.9 (L) 14.0 - 18.0 g/dL    Hematocrit 39.3 (L) 42.0 - 52.0 %    MCV 77.7 (L) 81.4 - 97.8 fL    MCH 25.5 (L) 27.0 - 33.0 pg    MCHC 32.8 (L) 33.7 - 35.3 g/dL    RDW 47.7 35.9 - 50.0 fL    Platelet Count 98 (L) 164 - 446 K/uL    MPV 9.0 9.0 - 12.9 fL    Neutrophils-Polys 71.60 44.00 - 72.00 %    Lymphocytes 23.10 22.00 - 41.00 %    Monocytes 3.70 0.00 - 13.40 %    Eosinophils 0.50 0.00 - 6.90 %    Basophils 0.80 0.00 - 1.80 %    Immature Granulocytes 0.30 0.00 - 0.90 %    Nucleated RBC 0.00 /100 WBC    Neutrophils (Absolute) 4.46 1.82 - 7.42 K/uL    Lymphs (Absolute) 1.44 1.00 - 4.80 K/uL    Monos (Absolute) 0.23 0.00 - 0.85 K/uL    Eos (Absolute) 0.03 0.00 - 0.51 K/uL    Baso (Absolute) 0.05 0.00 - 0.12 K/uL    Immature Granulocytes (abs) 0.02 0.00 - 0.11 K/uL    NRBC (Absolute) 0.00 K/uL   COMP METABOLIC PANEL   Result Value Ref Range    Sodium 134 (L) 135 - 145 mmol/L    Potassium 4.8 3.6 - 5.5 mmol/L    Chloride 102 96 - 112 mmol/L    Co2 18 (L) 20 - 33 mmol/L    Anion Gap 14.0 7.0 - 16.0    Glucose 104 (H) 65 - 99 mg/dL    Bun 10 8 - 22 mg/dL    Creatinine 0.50 0.50 - 1.40 mg/dL    Calcium 7.9 (L) 8.5 - 10.5 mg/dL    AST(SGOT) 75 (H) 12 - 45 U/L    ALT(SGPT) 27 2 - 50 U/L    Alkaline Phosphatase 139 (H) 30 - 99 U/L    Total Bilirubin 2.0 (H) 0.1 - 1.5 mg/dL    Albumin 3.1 (L) 3.2 - 4.9 g/dL    Total Protein 7.8 6.0 - 8.2 g/dL    Globulin 4.7 (H) 1.9 - 3.5 g/dL    A-G Ratio 0.7 g/dL   LIPASE   Result Value Ref Range    Lipase 97 (H) 11 - 82 U/L   TROPONIN   Result Value Ref Range    Troponin T 10 6 - 19 ng/L   MAGNESIUM   Result Value Ref Range    Magnesium 1.6 1.5 - 2.5 mg/dL   PHOSPHORUS   Result Value Ref Range    Phosphorus 3.7 2.5 - 4.5 mg/dL   IONIZED CALCIUM   Result Value Ref Range    Ionized Calcium 1.0 (L) 1.1 - 1.3 mmol/L    DIAGNOSTIC ALCOHOL   Result Value Ref Range    Diagnostic Alcohol 366.7 (H) 0.0 - 10.0 mg/dL   COVID/SARS CoV-2 PCR    Specimen: Nasopharyngeal; Respirate   Result Value Ref Range    COVID Order Status Received    AMMONIA   Result Value Ref Range    Ammonia 54 (H) 11 - 45 umol/L   PROCALCITONIN   Result Value Ref Range    Procalcitonin 0.06 <0.25 ng/mL   PT/INR   Result Value Ref Range    PT 19.3 (H) 12.0 - 14.6 sec    INR 1.57 (H) 0.87 - 1.13   ESTIMATED GFR   Result Value Ref Range    GFR If African American >60 >60 mL/min/1.73 m 2    GFR If Non African American >60 >60 mL/min/1.73 m 2        RADIOLOGY  The radiologist's interpretation of all radiological studies have been reviewed by me.     CT-ABDOMEN-PELVIS WITH   Final Result         1.  Small bowel mucosal thickening, appearance suggests enteritis.   2.  Changes of cirrhosis with massive abdominal ascites   3.  Esophageal and gastric varices   4.  Left inguinal hernia containing ascites   5.  Umbilical hernia containing ascites   6.  Splenomegaly      DX-CHEST-PORTABLE (1 VIEW)   Final Result         1.  No acute cardiopulmonary disease.   2.  Perihilar interstitial prominence and bronchial wall cuffing, appearance suggests changes of underlying bronchial inflammation, consider bronchitis.             COURSE & MEDICAL DECISION MAKING  Nursing notes, VS, PMSFHx reviewed in chart.     Review of past medical records shows the patient was last seen in this ED December 11, 2019 for bloody sputum and diarrhea.  Patient was last admitted to this hospital September 26, 2019 for GI bleed and discharged on September 29, 2019.      Differential diagnoses considered include but are not limited to: Ascites, SBP, cirrhosis, appendicitis, AMI, AAA, ileus, cholecystitis/ascending cholangitis, gallstone/biliary colic, pancreatitis, PUD, gastritis, KS/renal colic, UTI/pyelo, colitis, muscle strain      0420: D/W Dr. Ca the hospitalist, who will admit patient      History and  physical exam as above.  Patient was brought by [facility] from [place] due to right-sided abdominal pain with distention.  He has known history of alcoholic cirrhosis and reports only having 1 drink today despite alcohol level of 366 in our ED.  Anemia and thrombocytopenia appear chronic compared to prior results and likely from his chronic liver disease.  He has slight elevation of AST and bilirubin level compared to prior results.  Lipase and ammonia are also both mildly elevated.  Imaging studies with findings as above.  Findings discussed with the patient and he would like to be admitted.  Patient will likely need paracentesis.  Discussed with Dr. Ca who graciously agreed to admit patient for further care.        FINAL IMPRESSION  1. Right sided abdominal pain Acute   2. Abdominal distention Acute   3. Alcoholic cirrhosis of liver with ascites (HCC) Active   4. Anemia, unspecified type Chronic   5. Thrombocytopenia (HCC) Chronic   6. Hyperbilirubinemia Active   7. Elevated lipase Active   8. Alcoholism /alcohol abuse (HCC) Chronic          DISPOSITION  Patient will be admitted by hospitalist for further care      Electronically signed by: Dejon Kinsey D.O., 10/19/2020 1:15 AM      Portions of this record were made with voice recognition software.  Despite my review, spelling/grammar/context errors may still remain.  Interpretation of this chart should be taken in this context.

## 2020-10-19 NOTE — ASSESSMENT & PLAN NOTE
therapeutic/diagnostic paracentesis on admission  - ascitic fluid , cx NG  - holding Propranolol, lasix, spironolactone - BP is borderline low.  - continue Midodrine and albumin  - Alcohol cessation counseling  - needs GI outpatient follow up  - ordered repeat paracentesis before discharge

## 2020-10-19 NOTE — CARE PLAN
Problem: Communication  Goal: The ability to communicate needs accurately and effectively will improve  Outcome: PROGRESSING AS EXPECTED  Note: Patient is alert and able to communicate, patient effectively communicates needs using call light.      Problem: Safety  Goal: Will remain free from injury  Outcome: PROGRESSING AS EXPECTED  Note: Patient is lying with 3 rails placed up. Patient belongings and call light are within reach. [place] socks are worn and bed is at the lowest position. Patient has received safety and fall prevention education.

## 2020-10-19 NOTE — ASSESSMENT & PLAN NOTE
CT abd w/ Small bowel mucosal thickening, appearance suggests enteritis.  Pt with pinching abd pain but otherwise denies further Sx at this time  Abdominal pain resolved with paracentesis

## 2020-10-19 NOTE — H&P
"Hospital Medicine History & Physical Note    Date of Service  10/19/2020    Primary Care Physician  Tomy Wilde M.D.    Consultants  None    Code Status  Full Code    Chief Complaint  Chief Complaint   Patient presents with   • Abdominal Pain       History of Presenting Illness  45 y.o. male who presented 10/19/2020 with complaints of abdominal pain. Pt presents to the ED by care flight from [place].  He reports starting today he noticed 10/10 constant RLQ abdominal pain that is a \"pinching\" sensation. He states that he has not noticed any aggravating or reliving factors. He reports that he has had this issue in the past, and due to his history of liver cirrhosis he has issues with fluid building up in his abdomen every 6-9 months. He reports that his most recent paracentesis was 9 months ago after he was having similar complaints. On questioning, pt denies any fevers, chills, chest pain, N/V/D, or blood/mucus in stool. He does report some SOB that has worsened as his stomach size has increased. Currently, he reports some improvement of abdominal pain after receiving Fentanyl by EMS. Of note, pt denies any regular alcohol use, but does admit to 2-3 shots of alcohol today.     ED: Vitals w/  that is now wnl, otherwise unremarkable. WBC wnl, AST 75, ALT 25, lipase 97, ammonia 54, INR 1.57, procal negative, and alcohol level 366.      Review of Systems  Review of Systems   Constitutional: Negative for chills, fever and weight loss.   HENT: Negative for hearing loss and sore throat.    Eyes: Negative for blurred vision and pain.   Respiratory: Positive for shortness of breath (from abd distention). Negative for cough, hemoptysis and wheezing.    Cardiovascular: Negative for chest pain, palpitations, orthopnea and leg swelling.   Gastrointestinal: Positive for abdominal pain. Negative for blood in stool, constipation, diarrhea, nausea and vomiting.        Recurrent ascites   Genitourinary: Negative for dysuria and " hematuria.   Musculoskeletal: Negative for joint pain and myalgias.   Skin: Negative for rash.   Neurological: Negative for dizziness, loss of consciousness and weakness.   Psychiatric/Behavioral: Negative for depression and suicidal ideas.       Past Medical History   has a past medical history of Anemia, Cirrhosis (HCC), ETOH abuse, GI bleed, Liver disease, and Pancreatitis.    Surgical History   has a past surgical history that includes hernia repair; other; gastroscopy-endo (N/A, 4/13/2019); and gastroscopy-endo (9/27/2019).     Family History  family history includes Diabetes in his father.     Social History   reports that he has never smoked. He has never used smokeless tobacco. He reports current alcohol use. He reports that he does not use drugs.    Allergies  No Known Allergies    Medications  Prior to Admission Medications   Prescriptions Last Dose Informant Patient Reported? Taking?   omeprazole (PRILOSEC) 40 MG delayed-release capsule   No No   Sig: Take 1 Cap by mouth 2 Times a Day.   propranolol (INDERAL) 10 MG Tab   No No   Sig: Take 1 Tab by mouth every 8 hours.      Facility-Administered Medications: None       Physical Exam  Temp:  [36.3 °C (97.3 °F)] 36.3 °C (97.3 °F)  Pulse:  [] 95  Resp:  [14-16] 16  BP: (112-134)/(72-94) 121/76  SpO2:  [94 %-99 %] 99 %    Physical Exam  Vitals signs and nursing note reviewed.   Constitutional:       General: He is not in acute distress.     Appearance: Normal appearance.   HENT:      Head: Normocephalic and atraumatic.      Mouth/Throat:      Mouth: Mucous membranes are dry.   Eyes:      Extraocular Movements: Extraocular movements intact.   Neck:      Musculoskeletal: Normal range of motion.   Cardiovascular:      Rate and Rhythm: Normal rate and regular rhythm.      Heart sounds: No murmur. No gallop.    Pulmonary:      Effort: Pulmonary effort is normal.      Breath sounds: Normal breath sounds. No wheezing, rhonchi or rales.   Abdominal:       General: Bowel sounds are normal. There is distension.      Palpations: Abdomen is soft. There is no mass.      Tenderness: There is abdominal tenderness (RLQ on palpation). There is no right CVA tenderness, left CVA tenderness, guarding or rebound.      Hernia: A hernia is present.   Musculoskeletal: Normal range of motion.         General: No deformity.      Right lower leg: No edema.      Left lower leg: No edema.   Neurological:      General: No focal deficit present.      Mental Status: He is alert and oriented to person, place, and time.         Laboratory:  Recent Labs     10/19/20  0116   WBC 6.2   RBC 5.06   HEMOGLOBIN 12.9*   HEMATOCRIT 39.3*   MCV 77.7*   MCH 25.5*   MCHC 32.8*   RDW 47.7   PLATELETCT 98*   MPV 9.0     Recent Labs     10/19/20  0116   SODIUM 134*   POTASSIUM 4.8   CHLORIDE 102   CO2 18*   GLUCOSE 104*   BUN 10   CREATININE 0.50   CALCIUM 7.9*     Recent Labs     10/19/20  0116   ALTSGPT 27   ASTSGOT 75*   ALKPHOSPHAT 139*   TBILIRUBIN 2.0*   LIPASE 97*   GLUCOSE 104*     Recent Labs     10/19/20  0116   INR 1.57*     No results for input(s): NTPROBNP in the last 72 hours.      Recent Labs     10/19/20  0116   TROPONINT 10       Imaging:  CT-ABDOMEN-PELVIS WITH   Final Result         1.  Small bowel mucosal thickening, appearance suggests enteritis.   2.  Changes of cirrhosis with massive abdominal ascites   3.  Esophageal and gastric varices   4.  Left inguinal hernia containing ascites   5.  Umbilical hernia containing ascites   6.  Splenomegaly      DX-CHEST-PORTABLE (1 VIEW)   Final Result         1.  No acute cardiopulmonary disease.   2.  Perihilar interstitial prominence and bronchial wall cuffing, appearance suggests changes of underlying bronchial inflammation, consider bronchitis.      US-PARACENTESIS, ABD WITH IMAGING    (Results Pending)         Assessment/Plan:  I anticipate this patient is appropriate for observation status at this time.    * Ascites- (present on  admission)  Assessment & Plan  Pt with worsening RLQ abdominal pain and distention for the past few months  Currently denies any fevers, chills, N/V, diarrhea, or constipation  No obvious signs of infection at this time  CT abd w/ possible enteritis and massive ascites as well as gastric/esophageal varices  - therapeutic paracentesis  - will order ascitic fluid labs  - analgesics  - monitor BP  - c/w home Propranolol   - Alcohol cessation counseling    Enteritis- (present on admission)  Assessment & Plan  CT abd w/ Small bowel mucosal thickening, appearance suggests enteritis.  Pt with pinching abd pain but otherwise denies further Sx at this time  WBC wnl  - no obvious signs of bacterial infection, possibly viral?  - hold off on abx for now, can initiate if sx of worsening infection should develop    Alcohol abuse- (present on admission)  Assessment & Plan  Pt with alcohol cirrhosis  Denies routine drinking, but admits he drank 2-3 shots today  Alcohol level 366  - f/u Utox  - CIWA monitoring  - Multivitamin and RALLY Bag  - Thiamine 1mg qd x4 doses and Folic Acid 1mg qd x4 doses  - Seizure/Fall precautions  - Alcohol cessation counseling      VTE: Lovenox, SCD

## 2020-10-19 NOTE — ED TRIAGE NOTES
"Jimbo Santacruz  45 y.o.  Chief Complaint   Patient presents with   • Abdominal Pain       Pt was transferred from [place] for abdominal pain. Pt c/o of RLQ pain. Pt denies any N/V/D. Pt has hx of cirrhosis with ascites and states that the last time he got a paracentesis was \"6 or 9 months ago.\" Pt is alert and oriented on arrival. Pt received 200 mcg of fentanyl in flight by EMS.   "

## 2020-10-20 ENCOUNTER — APPOINTMENT (OUTPATIENT)
Dept: CARDIOLOGY | Facility: MEDICAL CENTER | Age: 45
DRG: 432 | End: 2020-10-20
Attending: INTERNAL MEDICINE
Payer: COMMERCIAL

## 2020-10-20 PROBLEM — D61.818 PANCYTOPENIA (HCC): Status: ACTIVE | Noted: 2020-10-20

## 2020-10-20 PROBLEM — R78.81 BACTEREMIA: Status: ACTIVE | Noted: 2020-10-20

## 2020-10-20 LAB
ALBUMIN SERPL BCP-MCNC: 2.5 G/DL (ref 3.2–4.9)
ALBUMIN/GLOB SERPL: 0.6 G/DL
ALP SERPL-CCNC: 119 U/L (ref 30–99)
ALT SERPL-CCNC: 22 U/L (ref 2–50)
AMPHET UR QL SCN: NEGATIVE
ANION GAP SERPL CALC-SCNC: 8 MMOL/L (ref 7–16)
ANISOCYTOSIS BLD QL SMEAR: ABNORMAL
APPEARANCE UR: CLEAR
AST SERPL-CCNC: 60 U/L (ref 12–45)
BACTERIA #/AREA URNS HPF: ABNORMAL /HPF
BARBITURATES UR QL SCN: NEGATIVE
BASOPHILS # BLD AUTO: 1.7 % (ref 0–1.8)
BASOPHILS # BLD: 0.06 K/UL (ref 0–0.12)
BENZODIAZ UR QL SCN: NEGATIVE
BILIRUB SERPL-MCNC: 2.5 MG/DL (ref 0.1–1.5)
BILIRUB UR QL STRIP.AUTO: ABNORMAL
BUN SERPL-MCNC: 10 MG/DL (ref 8–22)
BZE UR QL SCN: NEGATIVE
CALCIUM SERPL-MCNC: 7.8 MG/DL (ref 8.5–10.5)
CANNABINOIDS UR QL SCN: NEGATIVE
CHLORIDE SERPL-SCNC: 102 MMOL/L (ref 96–112)
CO2 SERPL-SCNC: 23 MMOL/L (ref 20–33)
COLOR UR: ABNORMAL
COVID ORDER STATUS COVID19: NORMAL
CREAT SERPL-MCNC: 0.65 MG/DL (ref 0.5–1.4)
EOSINOPHIL # BLD AUTO: 0 K/UL (ref 0–0.51)
EOSINOPHIL NFR BLD: 0 % (ref 0–6.9)
EPI CELLS #/AREA URNS HPF: ABNORMAL /HPF
ERYTHROCYTE [DISTWIDTH] IN BLOOD BY AUTOMATED COUNT: 48.3 FL (ref 35.9–50)
GLOBULIN SER CALC-MCNC: 4.1 G/DL (ref 1.9–3.5)
GLUCOSE SERPL-MCNC: 110 MG/DL (ref 65–99)
GLUCOSE UR STRIP.AUTO-MCNC: NEGATIVE MG/DL
HCT VFR BLD AUTO: 32.8 % (ref 42–52)
HGB BLD-MCNC: 10.8 G/DL (ref 14–18)
KETONES UR STRIP.AUTO-MCNC: ABNORMAL MG/DL
LEUKOCYTE ESTERASE UR QL STRIP.AUTO: NEGATIVE
LV EJECT FRACT  99904: 60
LV EJECT FRACT MOD 2C 99903: 65
LV EJECT FRACT MOD 4C 99902: 55.59
LV EJECT FRACT MOD BP 99901: 60
LYMPHOCYTES # BLD AUTO: 0.37 K/UL (ref 1–4.8)
LYMPHOCYTES NFR BLD: 10.4 % (ref 22–41)
MAGNESIUM SERPL-MCNC: 1.5 MG/DL (ref 1.5–2.5)
MANUAL DIFF BLD: NORMAL
MCH RBC QN AUTO: 26.2 PG (ref 27–33)
MCHC RBC AUTO-ENTMCNC: 32.9 G/DL (ref 33.7–35.3)
MCV RBC AUTO: 79.6 FL (ref 81.4–97.8)
METHADONE UR QL SCN: NEGATIVE
MICRO URNS: ABNORMAL
MICROCYTES BLD QL SMEAR: ABNORMAL
MONOCYTES # BLD AUTO: 0.06 K/UL (ref 0–0.85)
MONOCYTES NFR BLD AUTO: 1.7 % (ref 0–13.4)
MORPHOLOGY BLD-IMP: NORMAL
MRSA DNA SPEC QL NAA+PROBE: NORMAL
NEUTROPHILS # BLD AUTO: 3.1 K/UL (ref 1.82–7.42)
NEUTROPHILS NFR BLD: 86.1 % (ref 44–72)
NITRITE UR QL STRIP.AUTO: POSITIVE
NRBC # BLD AUTO: 0 K/UL
NRBC BLD-RTO: 0 /100 WBC
OPIATES UR QL SCN: POSITIVE
OVALOCYTES BLD QL SMEAR: NORMAL
OXYCODONE UR QL SCN: NEGATIVE
PCP UR QL SCN: NEGATIVE
PH UR STRIP.AUTO: 5.5 [PH] (ref 5–8)
PHOSPHATE SERPL-MCNC: 2.6 MG/DL (ref 2.5–4.5)
PLATELET # BLD AUTO: 53 K/UL (ref 164–446)
PLATELET BLD QL SMEAR: NORMAL
PLATELETS.RETICULATED NFR BLD AUTO: 3 K/UL (ref 0.6–13.1)
PMV BLD AUTO: 9.6 FL (ref 9–12.9)
POIKILOCYTOSIS BLD QL SMEAR: NORMAL
POLYCHROMASIA BLD QL SMEAR: NORMAL
POTASSIUM SERPL-SCNC: 4.3 MMOL/L (ref 3.6–5.5)
PROPOXYPH UR QL SCN: NEGATIVE
PROT SERPL-MCNC: 6.6 G/DL (ref 6–8.2)
PROT UR QL STRIP: NEGATIVE MG/DL
RBC # BLD AUTO: 4.12 M/UL (ref 4.7–6.1)
RBC # URNS HPF: ABNORMAL /HPF
RBC BLD AUTO: PRESENT
RBC UR QL AUTO: ABNORMAL
SIGNIFICANT IND 70042: NORMAL
SITE SITE: NORMAL
SODIUM SERPL-SCNC: 133 MMOL/L (ref 135–145)
SOURCE SOURCE: NORMAL
SP GR UR STRIP.AUTO: 1.02
UROBILINOGEN UR STRIP.AUTO-MCNC: 0.2 MG/DL
WBC # BLD AUTO: 3.6 K/UL (ref 4.8–10.8)
WBC #/AREA URNS HPF: ABNORMAL /HPF

## 2020-10-20 PROCEDURE — 97161 PT EVAL LOW COMPLEX 20 MIN: CPT

## 2020-10-20 PROCEDURE — 85007 BL SMEAR W/DIFF WBC COUNT: CPT

## 2020-10-20 PROCEDURE — 87086 URINE CULTURE/COLONY COUNT: CPT

## 2020-10-20 PROCEDURE — 700111 HCHG RX REV CODE 636 W/ 250 OVERRIDE (IP): Performed by: INTERNAL MEDICINE

## 2020-10-20 PROCEDURE — 36415 COLL VENOUS BLD VENIPUNCTURE: CPT

## 2020-10-20 PROCEDURE — 700102 HCHG RX REV CODE 250 W/ 637 OVERRIDE(OP): Performed by: STUDENT IN AN ORGANIZED HEALTH CARE EDUCATION/TRAINING PROGRAM

## 2020-10-20 PROCEDURE — 85027 COMPLETE CBC AUTOMATED: CPT

## 2020-10-20 PROCEDURE — 770006 HCHG ROOM/CARE - MED/SURG/GYN SEMI*

## 2020-10-20 PROCEDURE — 93306 TTE W/DOPPLER COMPLETE: CPT

## 2020-10-20 PROCEDURE — 85055 RETICULATED PLATELET ASSAY: CPT

## 2020-10-20 PROCEDURE — 96368 THER/DIAG CONCURRENT INF: CPT

## 2020-10-20 PROCEDURE — 81001 URINALYSIS AUTO W/SCOPE: CPT

## 2020-10-20 PROCEDURE — 700105 HCHG RX REV CODE 258: Performed by: INTERNAL MEDICINE

## 2020-10-20 PROCEDURE — 83735 ASSAY OF MAGNESIUM: CPT

## 2020-10-20 PROCEDURE — 99233 SBSQ HOSP IP/OBS HIGH 50: CPT | Performed by: INTERNAL MEDICINE

## 2020-10-20 PROCEDURE — 87040 BLOOD CULTURE FOR BACTERIA: CPT | Mod: 91

## 2020-10-20 PROCEDURE — 96366 THER/PROPH/DIAG IV INF ADDON: CPT

## 2020-10-20 PROCEDURE — 700102 HCHG RX REV CODE 250 W/ 637 OVERRIDE(OP): Performed by: INTERNAL MEDICINE

## 2020-10-20 PROCEDURE — 84100 ASSAY OF PHOSPHORUS: CPT

## 2020-10-20 PROCEDURE — 93306 TTE W/DOPPLER COMPLETE: CPT | Mod: 26 | Performed by: INTERNAL MEDICINE

## 2020-10-20 PROCEDURE — A9270 NON-COVERED ITEM OR SERVICE: HCPCS | Performed by: INTERNAL MEDICINE

## 2020-10-20 PROCEDURE — 80307 DRUG TEST PRSMV CHEM ANLYZR: CPT

## 2020-10-20 PROCEDURE — A9270 NON-COVERED ITEM OR SERVICE: HCPCS | Performed by: STUDENT IN AN ORGANIZED HEALTH CARE EDUCATION/TRAINING PROGRAM

## 2020-10-20 PROCEDURE — 80053 COMPREHEN METABOLIC PANEL: CPT

## 2020-10-20 PROCEDURE — 97165 OT EVAL LOW COMPLEX 30 MIN: CPT

## 2020-10-20 PROCEDURE — 87641 MR-STAPH DNA AMP PROBE: CPT

## 2020-10-20 PROCEDURE — 96367 TX/PROPH/DG ADDL SEQ IV INF: CPT

## 2020-10-20 PROCEDURE — 99255 IP/OBS CONSLTJ NEW/EST HI 80: CPT | Mod: GC | Performed by: INTERNAL MEDICINE

## 2020-10-20 PROCEDURE — 700111 HCHG RX REV CODE 636 W/ 250 OVERRIDE (IP): Performed by: STUDENT IN AN ORGANIZED HEALTH CARE EDUCATION/TRAINING PROGRAM

## 2020-10-20 RX ORDER — POTASSIUM CHLORIDE 20 MEQ/1
20 TABLET, EXTENDED RELEASE ORAL DAILY
Status: DISCONTINUED | OUTPATIENT
Start: 2020-10-20 | End: 2020-10-21

## 2020-10-20 RX ORDER — LORAZEPAM 2 MG/ML
1 INJECTION INTRAMUSCULAR
Status: DISCONTINUED | OUTPATIENT
Start: 2020-10-20 | End: 2020-10-22

## 2020-10-20 RX ORDER — LORAZEPAM 2 MG/ML
2 INJECTION INTRAMUSCULAR
Status: DISCONTINUED | OUTPATIENT
Start: 2020-10-20 | End: 2020-10-22

## 2020-10-20 RX ORDER — LORAZEPAM 0.5 MG/1
0.5 TABLET ORAL EVERY 4 HOURS PRN
Status: DISCONTINUED | OUTPATIENT
Start: 2020-10-20 | End: 2020-10-22

## 2020-10-20 RX ORDER — LORAZEPAM 1 MG/1
1 TABLET ORAL EVERY 4 HOURS PRN
Status: DISCONTINUED | OUTPATIENT
Start: 2020-10-20 | End: 2020-10-22

## 2020-10-20 RX ORDER — FUROSEMIDE 40 MG/1
40 TABLET ORAL
Status: DISCONTINUED | OUTPATIENT
Start: 2020-10-20 | End: 2020-10-21

## 2020-10-20 RX ORDER — LORAZEPAM 2 MG/ML
1.5 INJECTION INTRAMUSCULAR
Status: DISCONTINUED | OUTPATIENT
Start: 2020-10-20 | End: 2020-10-22

## 2020-10-20 RX ORDER — CEFAZOLIN SODIUM 2 G/100ML
2 INJECTION, SOLUTION INTRAVENOUS EVERY 8 HOURS
Status: DISCONTINUED | OUTPATIENT
Start: 2020-10-20 | End: 2020-10-20

## 2020-10-20 RX ORDER — LORAZEPAM 2 MG/ML
0.5 INJECTION INTRAMUSCULAR EVERY 4 HOURS PRN
Status: DISCONTINUED | OUTPATIENT
Start: 2020-10-20 | End: 2020-10-22

## 2020-10-20 RX ORDER — LORAZEPAM 2 MG/1
2 TABLET ORAL
Status: DISCONTINUED | OUTPATIENT
Start: 2020-10-20 | End: 2020-10-22

## 2020-10-20 RX ORDER — LORAZEPAM 2 MG/1
4 TABLET ORAL
Status: DISCONTINUED | OUTPATIENT
Start: 2020-10-20 | End: 2020-10-22

## 2020-10-20 RX ADMIN — OMEPRAZOLE 40 MG: 20 CAPSULE, DELAYED RELEASE ORAL at 17:51

## 2020-10-20 RX ADMIN — CEFAZOLIN SODIUM 2 G: 2 INJECTION, SOLUTION INTRAVENOUS at 06:07

## 2020-10-20 RX ADMIN — Medication 100 MG: at 06:02

## 2020-10-20 RX ADMIN — CEFAZOLIN SODIUM 2 G: 2 INJECTION, SOLUTION INTRAVENOUS at 13:52

## 2020-10-20 RX ADMIN — FUROSEMIDE 40 MG: 40 TABLET ORAL at 13:52

## 2020-10-20 RX ADMIN — VANCOMYCIN HYDROCHLORIDE 2000 MG: 500 INJECTION, POWDER, LYOPHILIZED, FOR SOLUTION INTRAVENOUS at 16:01

## 2020-10-20 RX ADMIN — OMEPRAZOLE 40 MG: 20 CAPSULE, DELAYED RELEASE ORAL at 06:02

## 2020-10-20 RX ADMIN — DOCUSATE SODIUM 50 MG AND SENNOSIDES 8.6 MG 2 TABLET: 8.6; 5 TABLET, FILM COATED ORAL at 17:52

## 2020-10-20 RX ADMIN — PROPRANOLOL HYDROCHLORIDE 10 MG: 10 TABLET ORAL at 13:52

## 2020-10-20 RX ADMIN — THERA TABS 1 TABLET: TAB at 06:02

## 2020-10-20 RX ADMIN — FOLIC ACID 1 MG: 1 TABLET ORAL at 06:02

## 2020-10-20 RX ADMIN — POTASSIUM CHLORIDE 20 MEQ: 1500 TABLET, EXTENDED RELEASE ORAL at 13:52

## 2020-10-20 RX ADMIN — PROPRANOLOL HYDROCHLORIDE 10 MG: 10 TABLET ORAL at 21:00

## 2020-10-20 RX ADMIN — PROPRANOLOL HYDROCHLORIDE 10 MG: 10 TABLET ORAL at 06:02

## 2020-10-20 ASSESSMENT — LIFESTYLE VARIABLES
AUDITORY DISTURBANCES: NOT PRESENT
PAROXYSMAL SWEATS: NO SWEAT VISIBLE
VISUAL DISTURBANCES: NOT PRESENT
NAUSEA AND VOMITING: NO NAUSEA AND NO VOMITING
TREMOR: NO TREMOR
AGITATION: NORMAL ACTIVITY
AUDITORY DISTURBANCES: NOT PRESENT
HEADACHE, FULLNESS IN HEAD: NOT PRESENT
TOTAL SCORE: 0
HEADACHE, FULLNESS IN HEAD: NOT PRESENT
AGITATION: NORMAL ACTIVITY
ANXIETY: NO ANXIETY (AT EASE)
ANXIETY: NO ANXIETY (AT EASE)
VISUAL DISTURBANCES: NOT PRESENT
TOTAL SCORE: 1
TOTAL SCORE: 0
AGITATION: NORMAL ACTIVITY
NAUSEA AND VOMITING: NO NAUSEA AND NO VOMITING
PAROXYSMAL SWEATS: NO SWEAT VISIBLE
PAROXYSMAL SWEATS: NO SWEAT VISIBLE
NAUSEA AND VOMITING: NO NAUSEA AND NO VOMITING
TREMOR: NO TREMOR
ORIENTATION AND CLOUDING OF SENSORIUM: ORIENTED AND CAN DO SERIAL ADDITIONS
TREMOR: NO TREMOR
AUDITORY DISTURBANCES: NOT PRESENT
ANXIETY: MILDLY ANXIOUS
VISUAL DISTURBANCES: NOT PRESENT
HEADACHE, FULLNESS IN HEAD: NOT PRESENT
ORIENTATION AND CLOUDING OF SENSORIUM: ORIENTED AND CAN DO SERIAL ADDITIONS
ORIENTATION AND CLOUDING OF SENSORIUM: ORIENTED AND CAN DO SERIAL ADDITIONS

## 2020-10-20 ASSESSMENT — ACTIVITIES OF DAILY LIVING (ADL): TOILETING: INDEPENDENT

## 2020-10-20 ASSESSMENT — COGNITIVE AND FUNCTIONAL STATUS - GENERAL
MOBILITY SCORE: 24
SUGGESTED CMS G CODE MODIFIER MOBILITY: CH
HELP NEEDED FOR BATHING: A LITTLE
SUGGESTED CMS G CODE MODIFIER DAILY ACTIVITY: CJ
PERSONAL GROOMING: A LITTLE
DAILY ACTIVITIY SCORE: 22

## 2020-10-20 ASSESSMENT — GAIT ASSESSMENTS
DEVIATION: BRADYKINETIC
DISTANCE (FEET): 100
GAIT LEVEL OF ASSIST: SUPERVISED

## 2020-10-20 ASSESSMENT — ENCOUNTER SYMPTOMS
LOSS OF CONSCIOUSNESS: 0
BLURRED VISION: 0
ABDOMINAL PAIN: 0
BLOOD IN STOOL: 0
NERVOUS/ANXIOUS: 0
SEIZURES: 0
FEVER: 0
CHILLS: 0
FALLS: 0
VOMITING: 0
SHORTNESS OF BREATH: 0
NAUSEA: 0

## 2020-10-20 NOTE — PROGRESS NOTES
"Pharmacy Kinetics 45 y.o. male on vancomycin day # 1 10/19/2020    Currently on Vancomycin New Start  a. Load:  Vancomycin 2,000mg IV x1  b. Maintenance: Vancomycin 1,000mg (12mg/kg) IV q8h  Provider specified end date: not yet specified    Indication for Treatment: unknown source of infection, possible bacteremia    Pertinent history per medical record: Admitted on 10/19/2020 for ascites, with paracentesis today, cultures pending.  One positive blood culture.    Other antibiotics: none    Allergies: Patient has no known allergies.     List concerns for renal function:  None at this time    Pertinent cultures to date:   10/19/20  Gram Stain Ascites Fluid:  No organisms seen  10/19/20  Blood Culture:  Grown detected:  Gram Stain:  G+ Cocci possible staphylococcus sp.  10/19/20  Blood Culture:  In process      MRSA nares swab if pneumonia is a concern (ordered): needs to be collected      Recent Labs     10/19/20  0116   WBC 6.2   NEUTSPOLYS 71.60     Recent Labs     10/19/20  0116 10/19/20  0447   BUN 10  --    CREATININE 0.50  --    ALBUMIN 3.1* 0.4     No results for input(s): VANCOTROUGH, VANCOPEAK, VANCORANDOM in the last 72 hours.    Intake/Output Summary (Last 24 hours) at 10/19/2020 2250  Last data filed at 10/19/2020 1900  Gross per 24 hour   Intake 100 ml   Output --   Net 100 ml      /59   Pulse 73   Temp 37.2 °C (99 °F) (Temporal)   Resp 18   Ht 1.727 m (5' 8\")   Wt 75.5 kg (166 lb 7.2 oz)   SpO2 96%  Temp (24hrs), Av.7 °C (98 °F), Min:36.3 °C (97.3 °F), Max:37.2 °C (99 °F)    A/P   2. Vancomycin dose change: New Start  a. Load:  Vancomycin 2,000mg IV x1  b. Maintenance: Vancomycin 1,000mg (12mg/kg) IV q8h  Next vancomycin level:  Clinical Pharmacist on rounds will evaluate patient and order trough when appropriate.  Since vancomycin can cause nephrotoxicity, vancomycin trough level and renal function labs are drawn to monitor efficacy and safety of vancomycin.  3. Goal trough: 15-20mcg/mL, " subject to shearer with indication  4. Comments: CMP am lab for 10/20/20 active in chart.  Pharmacy to recommend de-escelation of antibiotics when clinically appropriate     Fatuma Puri Pharm.D.,  10/19/20

## 2020-10-20 NOTE — PROGRESS NOTES
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 Load:  Vancomycin 2,000mg IV x1  b. Maintenance: Vancomycin 1,250mg (16mg/kg) IV q8h    Provider specified end date: tbd    Indication for Treatment:  Bacteremia, unknown source of infection    Pertinent history per medical record: Admitted on 10/19/2020 for ascites; paracentesis removed 4L. Cultures taken: blood, urine, ascites fluid.  Cultures resulted with MSSA; vanco d/c'd in favor on cefazolin. This result was later reversed, and further labwork resulted in Coag Neg Staph: vancomycin restarted (admin initiated 10/20/20 @ 16:04)    Other antibiotics: Vancomycin 2g x1 @ 2351 10/19/20 - d  Cefazolin 2g q8hr x2 doses @ 0607, 1356 10/20/20 - d/c'd    Allergies: Patient has no known allergies.     List concerns for renal function: MINIMAL, pt BP fluctuates between low 100s / 60s to ~130s / 70s    Pertinent cultures to date:   10/19/20 urinalysis: unremarkable for infxn  10/19/20  Gram Stain Ascites Fluid:  No organisms seen  10/19/20  bld cxs + x2 Coag Neg Staph      Recent Labs     10/19/20  0116 10/20/20  0854   WBC 6.2 3.6*   NEUTSPOLYS 71.60 86.10*     Recent Labs     10/19/20  0116 10/19/20  0447 10/20/20  0854   BUN 10  --  10   CREATININE 0.50  --  0.65   ALBUMIN 3.1* 0.4 2.5*     No results for input(s): VANCOTROUGH *    Intake/Output Summary (Last 24 hours) at 10/20/2020 1618  Last data filed at 10/20/2020 1430  Gross per 24 hour   Intake 1000 ml   Output --   Net 1000 ml      /63   Pulse 80   Temp 36.6 °C (97.8 °F) (Temporal)   Resp 16   Ht 1.727 m (5' 8\")   Wt 75.5 kg (166 lb 7.2 oz)   SpO2 97%  Temp (24hrs), Av.8 °C (98.2 °F), Min:36.6 °C (97.8 °F), Max:37.2 °C (99 °F)      A/P   Vancomycin dose " change: Re-start  Load:  Vancomycin 2,000mg IV x1 *admin 10/20/20 @ ~16:00  Maintenance: Vancomycin 1,250mg (12mg/kg) IV q8h starting @ midnight 10/21/20  Next vancomycin level:  vancomycin trough prior to 4th dose (prior to scheduled 16:00 dose 10/21/20).  2. Goal trough: ~ 15 mcg/mL for Coag Neg Staph bacteremia  3. Comments: BMP, CBC in AM 10/21/20. Patient has a history of alcohol abuse and slightly low serum Na+ which may indicate greater renal excretion than estimated by SCr alone. Patient has not shown leucocytosis while admitted; current WBC 3.6 with primarily neutrophils (ANC = 3100). ID following. ECHO pending. Total of 6 vancomycin maint doses entered at this time; last dose currently scheduled for 10/22/20 @ 1600

## 2020-10-20 NOTE — CARE PLAN
Problem: Communication  Goal: The ability to communicate needs accurately and effectively will improve  Outcome: PROGRESSING AS EXPECTED  Note: Educated patient to use call light when he is needing help. Call light within reach. Verbalized understanding.      Problem: Safety  Goal: Will remain free from falls  Outcome: PROGRESSING AS EXPECTED  Note: Patients bed in lowest and locked position, call light and side table within reach. Non-skid socks on and educated to use call light when he would like to get up. Verbalized understanding.       Problem: Infection  Goal: Will remain free from infection  Outcome: PROGRESSING AS EXPECTED

## 2020-10-20 NOTE — ASSESSMENT & PLAN NOTE
2/2 blood cx growing CoN staph: staph haemolyticus, TTE normal  No hardware, denies back pain or joint pain, abdominal pain on admission s/p para, ascitic cx NGTD  ABX therapy: Vanc-->ancef -->vanc-->daptomycin-->linezolid 10/23-10/26  Repeat bcx 10/20 NGTD  No futher treatment required, patient is not showing signs of sepsis or infection.

## 2020-10-20 NOTE — CONSULTS
[facility] INFECTIOUS DISEASES INPATIENT CONSULT NOTE     Date of Service: 10/20/2020    Consult Requested By: Prabha Ramirez M.D.    Reason for Consultation: Coagulase-negative Staphylococcus bacteremia    Chief Complaint: Right lower quadrant abdominal pain    History of Present Illness:     Jimbo Santacruz is a 45 y.o. male with a past medical history significant for alcoholic cirrhosis transferred from outside hospital upon patient's request for further evaluation of right lower quadrant abdominal pain associated with recurrent ascites requiring therapeutic paracentesis on admission, later found to be positive for coagulase-negative Staphylococcus on 10/19/2020 blood cultures x2.  10/19/2020 CT abdomen pelvis with contrast showed large ascites, nonspecific findings of small bowel mucosal thickening with no clinical correlation.  Status post paracentesis 10/19/2020, 4L removed.  Infectious disease consulted for aforementioned blood culture findings.    Patient states feeling significantly improved / back to baseline following paracentesis, with complete resolution of abdominal pain.  Describes previous abdominal pain as intermittent electric like shocks.  Denies fever, chills, chest pain, shortness of breath, nausea, vomiting, diarrhea, rash, skin wounds, illicit / IV drug use.  States last alcohol use 1 month ago, but blood alcohol noted to be 366.7 on admission.  He previously was receiving care in [place], with outpatient GI follow-up and paracentesis every 2-3 months (last outpatient paracentesis 2 months prior to this admission), but he plans to transfer care to [place] as he feels care will be better here.    Review of Systems:  All other systems reviewed and are negative expect as noted in HPI    Past Medical History:   Diagnosis Date   • Anemia    • Cirrhosis (HCC)    • ETOH abuse    • GI bleed    • Liver disease    • Pancreatitis        Past Surgical History:   Procedure Laterality Date   • GASTROSCOPY-ENDO   9/27/2019    Procedure: GASTROSCOPY;  Surgeon: Roberto Hankins M.D.;  Location: ENDOSCOPY [facility];  Service: Gastroenterology   • GASTROSCOPY-ENDO N/A 4/13/2019    Procedure: GASTROSCOPY with Banding;  Surgeon: Jose Andrade D.O.;  Location: SURGERY [facility];  Service: Gastroenterology   • HERNIA REPAIR     • OTHER      right leg fx repair       Family History   Problem Relation Age of Onset   • Diabetes Father        Social History     Socioeconomic History   • Marital status:      Spouse name: Not on file   • Number of children: Not on file   • Years of education: Not on file   • Highest education level: Not on file   Occupational History   • Not on file   Social Needs   • Financial resource strain: Not on file   • Food insecurity     Worry: Not on file     Inability: Not on file   • Transportation needs     Medical: Not on file     Non-medical: Not on file   Tobacco Use   • Smoking status: Never Smoker   • Smokeless tobacco: Never Used   Substance and Sexual Activity   • Alcohol use: Yes     Frequency: 4 or more times a week     Drinks per session: 5 or 6     Binge frequency: Daily or almost daily     Comment: occ   • Drug use: No   • Sexual activity: Not on file   Lifestyle   • Physical activity     Days per week: Not on file     Minutes per session: Not on file   • Stress: Not on file   Relationships   • Social connections     Talks on phone: Not on file     Gets together: Not on file     Attends [employer] service: Not on file     Active member of club or organization: Not on file     Attends meetings of clubs or organizations: Not on file     Relationship status: Not on file   • Intimate partner violence     Fear of current or ex partner: Not on file     Emotionally abused: Not on file     Physically abused: Not on file     Forced sexual activity: Not on file   Other Topics Concern   • Not on file   Social History Narrative   • Not on file       No Known  Allergies    Medications:    Current Facility-Administered Medications:   •  LORazepam (ATIVAN) tablet 0.5 mg, 0.5 mg, Oral, Q4HRS PRN, Prabha Ramirez M.D.  •  LORazepam (ATIVAN) tablet 1 mg, 1 mg, Oral, Q4HRS PRN **OR** LORazepam (ATIVAN) injection 0.5 mg, 0.5 mg, Intravenous, Q4HRS PRN, Prabha Ramirez M.D.  •  LORazepam (ATIVAN) tablet 2 mg, 2 mg, Oral, Q2HRS PRN **OR** LORazepam (ATIVAN) injection 1 mg, 1 mg, Intravenous, Q2HRS PRN, Prabha Ramirez M.D.  •  LORazepam (ATIVAN) tablet 3 mg, 3 mg, Oral, Q HOUR PRN **OR** LORazepam (ATIVAN) injection 1.5 mg, 1.5 mg, Intravenous, Q HOUR PRN, Prabha Ramirez M.D.  •  LORazepam (ATIVAN) tablet 4 mg, 4 mg, Oral, Q15 MIN PRN **OR** LORazepam (ATIVAN) injection 2 mg, 2 mg, Intravenous, Q15 MIN PRN, Prbaha Ramirez M.D.  •  furosemide (LASIX) tablet 40 mg, 40 mg, Oral, Q DAY, Prabha Ramirez M.D., 40 mg at 10/20/20 1352  •  potassium chloride SA (Kdur) tablet 20 mEq, 20 mEq, Oral, DAILY, Prabha Ramirez M.D., 20 mEq at 10/20/20 1352  •  MD Alert...Vancomycin per Pharmacy, , Other, PHARMACY TO DOSE, Prabha Ramirez M.D.  •  omeprazole (PRILOSEC) capsule 40 mg, 40 mg, Oral, BID, George Ca M.D., 40 mg at 10/20/20 0602  •  propranolol (INDERAL) tablet 10 mg, 10 mg, Oral, Q8HRS, George Ca M.D., 10 mg at 10/20/20 1352  •  [Held by provider] enoxaparin (LOVENOX) inj 40 mg, 40 mg, Subcutaneous, DAILY, George Ca M.D.  •  morphine (pf) 4 mg/mL injection 2 mg, 2 mg, Intravenous, Q4HRS PRN, George Ca M.D., 2 mg at 10/19/20 0733  •  senna-docusate (PERICOLACE or SENOKOT S) 8.6-50 MG per tablet 2 Tab, 2 Tab, Oral, BID **AND** polyethylene glycol/lytes (MIRALAX) PACKET 1 Packet, 1 Packet, Oral, QDAY PRN **AND** magnesium hydroxide (MILK OF MAGNESIA) suspension 30 mL, 30 mL, Oral, QDAY PRN **AND** bisacodyl (DULCOLAX) suppository 10 mg, 10 mg, Rectal, QDAY PRN, George Ca M.D.  •  ondansetron (ZOFRAN) syringe/vial injection 4 mg, 4 mg, Intravenous, Q4HRS PRN,  "George Ca M.D., 4 mg at 10/19/20 2113  •  ondansetron (ZOFRAN ODT) dispertab 4 mg, 4 mg, Oral, Q4HRS PRN, George Ca M.D.  •  promethazine (PHENERGAN) tablet 12.5-25 mg, 12.5-25 mg, Oral, Q4HRS PRN, George Ca M.D., 25 mg at 10/19/20 2159  •  promethazine (PHENERGAN) suppository 12.5-25 mg, 12.5-25 mg, Rectal, Q4HRS PRN, George Ca M.D.  •  prochlorperazine (COMPAZINE) injection 5-10 mg, 5-10 mg, Intravenous, Q4HRS PRN, George Ca M.D.  •  [COMPLETED] detox IV 1000 mL (D5LR + magnesium 1 g + thiamine 100 mg + folic acid 1 mg) infusion, , Intravenous, Once, Last Rate: 250 mL/hr at 10/19/20 0723 **AND** thiamine (vitamin B-1) tablet 100 mg, 100 mg, Oral, DAILY, 100 mg at 10/20/20 06 **AND** multivitamin (THERAGRAN) tablet 1 Tab, 1 Tab, Oral, DAILY, 1 Tab at 10/20/20 06 **AND** folic acid (FOLVITE) tablet 1 mg, 1 mg, Oral, DAILY, George Ca M.D., 1 mg at 10/20/20 06    Physical Exam:   Vital Signs: /63   Pulse 80   Temp 36.6 °C (97.8 °F) (Temporal)   Resp 16   Ht 1.727 m (5' 8\")   Wt 75.5 kg (166 lb 7.2 oz)   SpO2 97%   BMI 25.31 kg/m²   Temp  Av.7 °C (98 °F)  Min: 36.3 °C (97.3 °F)  Max: 37.2 °C (99 °F)  Vital signs reviewed    Constitutional: Appears well-developed and well-nourished. No distress.  Head: Atraumatic, normocephalic.  Eyes: Conjunctivae normal, EOM intact. Pupils are equal, round, and reactive to light.   Neck: Neck supple. No limitations in active range of motion.  Cardiovascular: Normal rate, regular rhythm, S1, S2.  No murmur, gallop, or friction rub.  Pulmonary/Chest: No respiratory distress. Unlabored respiratory effort, lungs clear to auscultation. No wheezes or rales.   Abdominal: Some distension.  Paracentesis site in right lower quadrant noted, covered by clean bandage without surrounding erythema or drainage.  Soft, non tender.  Musculoskeletal: Freely moving all extremities, strength intact.  No bilateral lower extremity edema.  Neurological: Alert and oriented to " person, place, and time. No focal neural deficit noted  Skin: Skin is warm and dry. No rashes or embolic phenomena noted on hands / nails or other exposed skin.  Psychiatric: Normal mood and affect. Behavior is normal.     LABS:  Recent Labs     10/19/20  0116 10/20/20  0854   WBC 6.2 3.6*      Recent Labs     10/19/20  0116 10/20/20  0854   HEMOGLOBIN 12.9* 10.8*   HEMATOCRIT 39.3* 32.8*   MCV 77.7* 79.6*   MCH 25.5* 26.2*   ANISOCYTOSIS  --  1+   PLATELETCT 98* 53*       Recent Labs     10/19/20  0116 10/20/20  0854   SODIUM 134* 133*   POTASSIUM 4.8 4.3   CHLORIDE 102 102   CO2 18* 23   CREATININE 0.50 0.65        Recent Labs     10/19/20  0116 10/19/20  0447 10/20/20  0854   ALBUMIN 3.1* 0.4 2.5*        MICRO:  No results found for: BLOODCULTU, BLDCULT, BCHOLD     Latest pertinent labs were reviewed    IMAGING STUDIES:  US-PARACENTESIS, ABD WITH IMAGING   Final Result      1. Ultrasound-guided diagnostic paracentesis of the right lower quadrant of the abdominal wall.      2. 1000 mL of fluid withdrawn.      CT-ABDOMEN-PELVIS WITH   Final Result         1.  Small bowel mucosal thickening, appearance suggests enteritis.   2.  Changes of cirrhosis with massive abdominal ascites   3.  Esophageal and gastric varices   4.  Left inguinal hernia containing ascites   5.  Umbilical hernia containing ascites   6.  Splenomegaly      DX-CHEST-PORTABLE (1 VIEW)   Final Result         1.  No acute cardiopulmonary disease.   2.  Perihilar interstitial prominence and bronchial wall cuffing, appearance suggests changes of underlying bronchial inflammation, consider bronchitis.      EC-ECHOCARDIOGRAM COMPLETE W/O CONT    (Results Pending)         Assessment and Plan:   Jimbo Santacruz is a 45 y.o. male with a past medical history significant for alcoholic cirrhosis transferred from outside hospital upon patient's request for further evaluation of right lower quadrant abdominal pain associated with recurrent ascites requiring  therapeutic paracentesis on admission, later found to be positive for coagulase-negative Staphylococcus on 10/19/2020 blood cultures x2.  10/19/2020 CT abdomen pelvis with contrast showed large ascites, nonspecific findings of small bowel mucosal thickening with no clinical correlation.  Status post paracentesis 10/19/2020, 4L removed.    Patient's hemodynamic and clinical stability, lack of systemic symptoms or complaints other than ascites-related abdominal discomfort, peritoneal fluid WBC <250, preliminary negative peritoneal fluid results argue against spontaneous bacterial peritonitis.  Unclear etiology of coagulase-negative Staphylococcus on 10/19/2020 blood cultures - no skin trauma, IV drug use, or other symptoms other than aforementioned abdominal discomfort.  May still be secondary to contamination from skin jerardo if further speciation of the separate sets of blood cultures differ.  Will cover with vancomycin for now given possibility of methicillin-resistant species, as well as obtain transthoracic echo and repeat blood cultures 10/20/2020 for further work-up.    -Switch back from cefazolin to vancomycin pending further blood culture results.  -Follow up on 10/19/2020 and 10/20/2020 blood culture results.  -Follow up on 10/19/2020 peritoneal fluid results - negative so far.  -Pending transthoracic echo.    Antibiotics course:  -Vancomycin 10/19/2020  -Cefazolin 10/20/2020

## 2020-10-20 NOTE — PROGRESS NOTES
Hospital Medicine Daily Progress Note    Date of Service  10/20/2020    Chief Complaint  45 y.o. male admitted 10/19/2020 with abdominal pain    Hospital Course    45-year-old male with a history of alcoholic cirrhosis complicated by esophageal varices status post banding (9/2019) and ascites who presented on 10/19/2020 with severe acute abdominal pain reported in right lower quadrant.  He denied any fever/chills or GI bleeding.  Patient denies regular alcohol use but does report 2-3 shots of alcohol on day of admission.  CT abdomen showed possible enteritis and massive ascites as well as gastric/esophageal varices.  Patient underwent paracentesis on 10/19/2020 with improvement in abdominal pain.  They removed 4 L.      Interval Problem Update  -Blood cultures 2/2 growing coag negative staph, consulting ID, repeating cultures, will switch to vanc until further bcx results  - Reports feeling much better after para yesterday, ascitic fluid   - H/H dropped 12.9-->10.8, plt 98-->53  - Afebrile, WBC 6.2-->3.6  - On RA, tolerating PO    Consultants/Specialty  ID    Code Status  Full Code    Disposition  TBD, likely home when medically clear    Review of Systems  Review of Systems   Constitutional: Negative for chills and fever.   HENT: Negative for hearing loss and nosebleeds.    Eyes: Negative for blurred vision.   Respiratory: Negative for shortness of breath.    Cardiovascular: Negative for chest pain and leg swelling.   Gastrointestinal: Negative for abdominal pain, blood in stool, melena, nausea and vomiting.   Genitourinary: Negative for hematuria.   Musculoskeletal: Negative for falls.   Skin: Negative for rash.   Neurological: Negative for seizures and loss of consciousness.   Psychiatric/Behavioral: The patient is not nervous/anxious.         Physical Exam  Temp:  [36.6 °C (97.8 °F)-37.2 °C (99 °F)] 36.6 °C (97.8 °F)  Pulse:  [65-80] 80  Resp:  [16-18] 16  BP: (103-126)/(59-67) 126/63  SpO2:  [96 %-100 %]  97 %    Physical Exam  Vitals signs and nursing note reviewed.   Constitutional:       General: He is not in acute distress.     Appearance: He is not toxic-appearing or diaphoretic.   HENT:      Head: Normocephalic and atraumatic.      Nose: Nose normal.      Mouth/Throat:      Mouth: Mucous membranes are moist.   Eyes:      General: No scleral icterus.        Right eye: No discharge.         Left eye: No discharge.      Conjunctiva/sclera: Conjunctivae normal.   Neck:      Musculoskeletal: Normal range of motion.   Cardiovascular:      Rate and Rhythm: Normal rate and regular rhythm.      Heart sounds: Normal heart sounds.   Pulmonary:      Effort: Pulmonary effort is normal.      Breath sounds: Normal breath sounds.   Abdominal:      General: Bowel sounds are normal. There is no distension.      Palpations: Abdomen is soft.      Tenderness: There is no abdominal tenderness.   Musculoskeletal:      Right lower leg: No edema.      Left lower leg: No edema.   Skin:     General: Skin is warm and dry.   Neurological:      Mental Status: He is alert and oriented to person, place, and time.      Motor: No tremor.      Comments: No asterixis   Psychiatric:         Mood and Affect: Mood normal.         Behavior: Behavior normal.         Fluids    Intake/Output Summary (Last 24 hours) at 10/20/2020 1318  Last data filed at 10/20/2020 0100  Gross per 24 hour   Intake 400 ml   Output --   Net 400 ml       Laboratory  Recent Labs     10/19/20  0116 10/20/20  0854   WBC 6.2 3.6*   RBC 5.06 4.12*   HEMOGLOBIN 12.9* 10.8*   HEMATOCRIT 39.3* 32.8*   MCV 77.7* 79.6*   MCH 25.5* 26.2*   MCHC 32.8* 32.9*   RDW 47.7 48.3   PLATELETCT 98* 53*   MPV 9.0 9.6     Recent Labs     10/19/20  0116 10/20/20  0854   SODIUM 134* 133*   POTASSIUM 4.8 4.3   CHLORIDE 102 102   CO2 18* 23   GLUCOSE 104* 110*   BUN 10 10   CREATININE 0.50 0.65   CALCIUM 7.9* 7.8*     Recent Labs     10/19/20  0116   INR 1.57*               Imaging  US-PARACENTESIS,  ABD WITH IMAGING   Final Result      1. Ultrasound-guided diagnostic paracentesis of the right lower quadrant of the abdominal wall.      2. 1000 mL of fluid withdrawn.      CT-ABDOMEN-PELVIS WITH   Final Result         1.  Small bowel mucosal thickening, appearance suggests enteritis.   2.  Changes of cirrhosis with massive abdominal ascites   3.  Esophageal and gastric varices   4.  Left inguinal hernia containing ascites   5.  Umbilical hernia containing ascites   6.  Splenomegaly      DX-CHEST-PORTABLE (1 VIEW)   Final Result         1.  No acute cardiopulmonary disease.   2.  Perihilar interstitial prominence and bronchial wall cuffing, appearance suggests changes of underlying bronchial inflammation, consider bronchitis.      EC-ECHOCARDIOGRAM COMPLETE W/O CONT    (Results Pending)        Assessment/Plan  * Ascites- (present on admission)  Assessment & Plan  Pt with worsening RLQ abdominal pain and distention for the past few months  Currently denies any fevers, chills, N/V, diarrhea, or constipation  CT abd w/ possible enteritis and massive ascites as well as gastric/esophageal varices  - therapeutic paracentesis  - ascitic fluid , cx pending  - analgesics  - monitor BP  - c/w home Propranolol   - Alcohol cessation counseling  - Will start lasix 40mg daily, if Cr tolerates can add spironolactone later or outpatient  - needs GI outpatient follow up    Bacteremia  Assessment & Plan  2/2 blood cx growing CoN staph  Source? No hardware, denies back pain or joint pain, abdominal pain on admission s/p para, ascitic cx NGTD  vanc-->ancef -->vanc per ID recs  Repeat bcx  echo    Pancytopenia (HCC)  Assessment & Plan  Likely in setting of cirrhosis, no neutropenia  H/H and plt dropped after procedure, but vital signs normal and no obvious bleeding  CTM    Enteritis- (present on admission)  Assessment & Plan  CT abd w/ Small bowel mucosal thickening, appearance suggests enteritis.  Pt with pinching abd pain but  otherwise denies further Sx at this time  Abdominal pain resolved with paracentesis however bcx growing CoN staph, on ancef, consulting ID      Alcohol abuse- (present on admission)  Assessment & Plan  Pt with alcohol cirrhosis  Denies routine drinking, but admits he drank 2-3 shots today  Alcohol level 366  - f/u Utox  - CIWA monitoring  - Multivitamin and RALLY Bag  - Thiamine 1mg qd x4 doses and Folic Acid 1mg qd x4 doses  - Seizure/Fall precautions  - Alcohol cessation counseling      VTE: SCDs       VTE prophylaxis: SCDs

## 2020-10-20 NOTE — PROGRESS NOTES
Paged hospitalist to report 2nd set of blood cultures had resulted positive with gram positive cocci, possilbe staph. This RN waited 15 minutes for page back without success. Passed off to day shift Camille NAIK that call is expected from hospitalist to report results.

## 2020-10-20 NOTE — THERAPY
Physical Therapy   Initial Evaluation     Patient Name: Jimbo Santacruz  Age:  45 y.o., Sex:  male  Medical Record #: [id number]  Today's Date: 10/20/2020          Assessment  Mr. Santacruz is a 44 y/o male who presents to acute secondary to ascites s/p paracentesis, enteritis, and alcohol abuse. He presents at his baseline level of function. LE strength equal bilaterally and WFL. Able to perform gait, transfers, and bed mobility without physical assist. Only required therapist to manage IV pole. Tremulous when ambulating, but pt reports this is baseline and suspect may be beginnings of withdrawal. Pt very anxious to discharge. No additional acute PT needs, however if he does enter alcohol withdrawal does have potential for decline in mobility. Patient will not be actively followed for physical therapy services at this time, however may be seen if requested by physician for 1 more visit within 30 days to address any discharge or equipment needs    Plan    Recommend Physical Therapy for Evaluation only    DC Equipment Recommendations: None  Discharge Recommendations: Anticipate that the patient will have no further physical therapy needs after discharge from the hospital            Objective       10/20/20 0832   Prior Living Situation   Prior Services None   Housing / Facility 1 Story House   Steps Into Home 0   Equipment Owned None   Lives with - Patient's Self Care Capacity Spouse   Prior Level of Functional Mobility   Bed Mobility Independent   Transfer Status Independent   Ambulation Independent   Distance Ambulation (Feet)   (community ambulator)   Assistive Devices Used None   Cognition    Level of Consciousness Alert   Passive ROM Lower Body   Passive ROM Lower Body WDL   Active ROM Lower Body    Active ROM Lower Body  WDL   Strength Lower Body   Lower Body Strength  WDL   Sensation Lower Body   Lower Extremity Sensation   WDL   Balance Assessment   Sitting Balance (Static) Good   Sitting Balance (Dynamic) Good    Standing Balance (Static) Good   Standing Balance (Dynamic) Good   Weight Shift Sitting Good   Weight Shift Standing Good   Comments no AD   Gait Analysis   Gait Level Of Assist Supervised   Assistive Device None   Distance (Feet) 100   # of Times Distance was Traveled 1   Deviation Bradykinetic   Weight Bearing Status no restrictions   Comments slightly tremulous, reports is baseline   Bed Mobility    Supine to Sit Modified Independent   Sit to Supine Modified Independent   Functional Mobility   Sit to Stand Modified Independent

## 2020-10-20 NOTE — ASSESSMENT & PLAN NOTE
Likely in setting of cirrhosis, no neutropenia. Consider MDS if patient's labs worsen.  H/H and plt dropped after procedure, but vital signs normal and no obvious bleeding  CTM

## 2020-10-20 NOTE — PROGRESS NOTES
Tyson from Lab called with critical result of: final blood culture results- staph aeurus methicillin sensitive at 0425. Critical lab result read back to Tyson.   Dr. Polanco notified of critical lab result at 0455. Critical lab result read back by Dr. Polanco.    Dr. Polanco states will change Vanco order to [facility].

## 2020-10-20 NOTE — PROGRESS NOTES
Assumed care of patient at 1900 after receiving report from day shift RN. Patient is alert and oriented. VSS. Denies pain. Complains of nausea relieved with PRN meds per MAR. Ambulates with standby assist. Positive blood cultures resulted this shift, MD notified. MRSA swab sent to lab and IV Vanco initiated. Call light within reach, hourly rounding in place.

## 2020-10-20 NOTE — THERAPY
"Occupational Therapy   Initial Evaluation     Patient Name: Jimbo Santacruz  Age:  45 y.o., Sex:  male  Medical Record #: [id number]  Today's Date: 10/20/2020     Precautions  Precautions:  Fall Risk    Assessment  Patient is 45 y.o. male admitted for worsening ascites and enteritis s/p paracentesis. PMHx of ETOH abuse, multiple paracenteses, and liver cirrhosis. Completed ADLs/txfs with SPV and functional ambulation w/o AD and SPV. Reports has \"someone\" at home who can assist PRN and completed IADLs at baseline, however they also work. Patient will not be actively followed for occupational therapy services at this time, however may be seen if requested by physician for 1 more visit within 30 days to address any discharge or equipment needs.     Plan    Recommend Occupational Therapy for Evaluation only     DC Equipment Recommendations: Tub / Shower Seat  Discharge Recommendations:  Anticipate that the patient will have no further occupational therapy needs after discharge from the hospital      Objective     10/20/20 0929   Prior Living Situation   Prior Services None   Housing / Facility 1 Story House   Steps Into Home 0   Bathroom Set up Walk In Shower   Equipment Owned None   Lives with - Patient's Self Care Capacity Other (Comments)   Comments Pt reports [place] 2 homes, [place]. Reports lives with \"someone\" who completes all IADLs for pt at baseline while he works   Prior Level of ADL Function   Self Feeding Independent   Grooming / Hygiene Independent   Bathing Independent   Dressing Independent   Toileting Independent   Prior Level of IADL Function   Medication Management Independent   Laundry Requires Assist   Kitchen Mobility Independent   Finances Independent   Home Management Requires Assist   Shopping Requires Assist   Prior Level Of Mobility Independent Without Device in Community;Independent Without Device in Home   Driving / Transportation Driving Independent   Occupation " (Pre-Hospital Vocational)   (works in the mine as a )   History of Falls   History of Falls No   Pain 0 - 10 Group   Location Abdomen   Therapist Pain Assessment During Activity;Post Activity Pain Same as Prior to Activity  (not quantified)   Cognition    Cognition / Consciousness WDL   Level of Consciousness Alert   Comments cooperative   Passive ROM Upper Body   Passive ROM Upper Body WDL   Active ROM Upper Body   Active ROM Upper Body  WDL   Strength Upper Body   Upper Body Strength  WDL   Balance Assessment   Sitting Balance (Static) Good   Sitting Balance (Dynamic) Good   Standing Balance (Static) Good   Standing Balance (Dynamic) Good   Weight Shift Sitting Good   Weight Shift Standing Good   Comments no AD   Bed Mobility    Supine to Sit Supervised   Sit to Supine Supervised   Scooting Supervised   Comments HOB slightly elevated   ADL Assessment   Grooming Supervision;Standing  (washing hands; v/cs for thoroughness)   Lower Body Dressing Supervision   Toileting Supervision   Functional Mobility   Sit to Stand Supervised   Bed, Chair, Wheelchair Transfer Supervised   Toilet Transfers Supervised   Mobility w/o AD   Edema / Skin Assessment   Edema / Skin  Not Assessed   Activity Tolerance   Sitting in Chair 2 min on toilet   Sitting Edge of Bed 4 min   Standing 6 min   Anticipated Discharge Equipment and Recommendations   DC Equipment Recommendations Tub / Shower Seat   Discharge Recommendations Anticipate that the patient will have no further occupational therapy needs after discharge from the hospital

## 2020-10-20 NOTE — PROGRESS NOTES
Report received from HEENA Peterson at 0700. Assumed care, assessment complete. Pt is A & O x 4.  Pt denies having any pain at this time. Informed patient on the need for a urine sample and to let this RN know when it is provided as well as the CNA. Verbalized understanding. Fall precautions and appropriate signs in place. Pt oriented to unit routine, call light/phone system and CNA and RN extension number provided. Pt educated regarding fall precautions. Bed alarm in use and locked in lowest position. Pt denies any additional needs at this time. Call light within reach.

## 2020-10-20 NOTE — CARE PLAN
Problem: Venous Thromboembolism (VTW)/Deep Vein Thrombosis (DVT) Prevention:  Goal: Patient will participate in Venous Thrombosis (VTE)/Deep Vein Thrombosis (DVT)Prevention Measures  Outcome: PROGRESSING AS EXPECTED  Note: Ambulates to bathroom.      Problem: Infection  Goal: Will remain free from infection  Note: Positive blood culture results. MD ordered additional testing, MRSA swab and starting IV Vanco.

## 2020-10-20 NOTE — PROGRESS NOTES
Lab called with critical result of positive blood cultures at 2150. Karla Pagan RN received phone call and notified this RN.   Dr. Polanco notified of critical lab result at 2215.  Critical lab result read back by Dr. Polanco. Dr. Polanco to put in staph screen and Vanco orders.

## 2020-10-21 LAB
ANION GAP SERPL CALC-SCNC: 12 MMOL/L (ref 7–16)
ANION GAP SERPL CALC-SCNC: 13 MMOL/L (ref 7–16)
ANION GAP SERPL CALC-SCNC: 9 MMOL/L (ref 7–16)
BACTERIA BLD CULT: ABNORMAL
BASOPHILS # BLD AUTO: 0.3 % (ref 0–1.8)
BASOPHILS # BLD: 0.01 K/UL (ref 0–0.12)
BUN SERPL-MCNC: 13 MG/DL (ref 8–22)
BUN SERPL-MCNC: 18 MG/DL (ref 8–22)
BUN SERPL-MCNC: 19 MG/DL (ref 8–22)
CALCIUM SERPL-MCNC: 7.9 MG/DL (ref 8.5–10.5)
CALCIUM SERPL-MCNC: 8.2 MG/DL (ref 8.5–10.5)
CALCIUM SERPL-MCNC: 8.2 MG/DL (ref 8.5–10.5)
CHLORIDE SERPL-SCNC: 100 MMOL/L (ref 96–112)
CHLORIDE SERPL-SCNC: 98 MMOL/L (ref 96–112)
CHLORIDE SERPL-SCNC: 98 MMOL/L (ref 96–112)
CO2 SERPL-SCNC: 20 MMOL/L (ref 20–33)
CO2 SERPL-SCNC: 20 MMOL/L (ref 20–33)
CO2 SERPL-SCNC: 23 MMOL/L (ref 20–33)
CREAT SERPL-MCNC: 1.01 MG/DL (ref 0.5–1.4)
CREAT SERPL-MCNC: 2.54 MG/DL (ref 0.5–1.4)
CREAT SERPL-MCNC: 2.75 MG/DL (ref 0.5–1.4)
EOSINOPHIL # BLD AUTO: 0.08 K/UL (ref 0–0.51)
EOSINOPHIL NFR BLD: 2.3 % (ref 0–6.9)
ERYTHROCYTE [DISTWIDTH] IN BLOOD BY AUTOMATED COUNT: 50.1 FL (ref 35.9–50)
GLUCOSE SERPL-MCNC: 101 MG/DL (ref 65–99)
GLUCOSE SERPL-MCNC: 129 MG/DL (ref 65–99)
GLUCOSE SERPL-MCNC: 157 MG/DL (ref 65–99)
HCT VFR BLD AUTO: 31.9 % (ref 42–52)
HGB BLD-MCNC: 10.4 G/DL (ref 14–18)
IMM GRANULOCYTES # BLD AUTO: 0.01 K/UL (ref 0–0.11)
IMM GRANULOCYTES NFR BLD AUTO: 0.3 % (ref 0–0.9)
LYMPHOCYTES # BLD AUTO: 0.58 K/UL (ref 1–4.8)
LYMPHOCYTES NFR BLD: 16.8 % (ref 22–41)
MAGNESIUM SERPL-MCNC: 1.3 MG/DL (ref 1.5–2.5)
MCH RBC QN AUTO: 26.1 PG (ref 27–33)
MCHC RBC AUTO-ENTMCNC: 32.6 G/DL (ref 33.7–35.3)
MCV RBC AUTO: 79.9 FL (ref 81.4–97.8)
MONOCYTES # BLD AUTO: 0.23 K/UL (ref 0–0.85)
MONOCYTES NFR BLD AUTO: 6.7 % (ref 0–13.4)
MORPHOLOGY BLD-IMP: NORMAL
NEUTROPHILS # BLD AUTO: 2.54 K/UL (ref 1.82–7.42)
NEUTROPHILS NFR BLD: 73.6 % (ref 44–72)
NRBC # BLD AUTO: 0 K/UL
NRBC BLD-RTO: 0 /100 WBC
PLATELET # BLD AUTO: 44 K/UL (ref 164–446)
PLATELETS.RETICULATED NFR BLD AUTO: 4.3 K/UL (ref 0.6–13.1)
PMV BLD AUTO: 9.9 FL (ref 9–12.9)
POTASSIUM SERPL-SCNC: 3.9 MMOL/L (ref 3.6–5.5)
POTASSIUM SERPL-SCNC: 4.2 MMOL/L (ref 3.6–5.5)
POTASSIUM SERPL-SCNC: 4.4 MMOL/L (ref 3.6–5.5)
RBC # BLD AUTO: 3.99 M/UL (ref 4.7–6.1)
SIGNIFICANT IND 70042: ABNORMAL
SIGNIFICANT IND 70042: ABNORMAL
SITE SITE: ABNORMAL
SITE SITE: ABNORMAL
SODIUM SERPL-SCNC: 130 MMOL/L (ref 135–145)
SODIUM SERPL-SCNC: 131 MMOL/L (ref 135–145)
SODIUM SERPL-SCNC: 132 MMOL/L (ref 135–145)
SOURCE SOURCE: ABNORMAL
SOURCE SOURCE: ABNORMAL
VANCOMYCIN TROUGH SERPL-MCNC: 46.5 UG/ML (ref 10–20)
VANCOMYCIN TROUGH SERPL-MCNC: 55.3 UG/ML (ref 10–20)
WBC # BLD AUTO: 3.5 K/UL (ref 4.8–10.8)

## 2020-10-21 PROCEDURE — 700111 HCHG RX REV CODE 636 W/ 250 OVERRIDE (IP): Performed by: INTERNAL MEDICINE

## 2020-10-21 PROCEDURE — A9270 NON-COVERED ITEM OR SERVICE: HCPCS | Performed by: STUDENT IN AN ORGANIZED HEALTH CARE EDUCATION/TRAINING PROGRAM

## 2020-10-21 PROCEDURE — 700105 HCHG RX REV CODE 258: Performed by: INTERNAL MEDICINE

## 2020-10-21 PROCEDURE — 85025 COMPLETE CBC W/AUTO DIFF WBC: CPT

## 2020-10-21 PROCEDURE — 83735 ASSAY OF MAGNESIUM: CPT

## 2020-10-21 PROCEDURE — 700102 HCHG RX REV CODE 250 W/ 637 OVERRIDE(OP): Performed by: STUDENT IN AN ORGANIZED HEALTH CARE EDUCATION/TRAINING PROGRAM

## 2020-10-21 PROCEDURE — 99233 SBSQ HOSP IP/OBS HIGH 50: CPT | Mod: GC | Performed by: INTERNAL MEDICINE

## 2020-10-21 PROCEDURE — 85055 RETICULATED PLATELET ASSAY: CPT

## 2020-10-21 PROCEDURE — 99232 SBSQ HOSP IP/OBS MODERATE 35: CPT | Performed by: INTERNAL MEDICINE

## 2020-10-21 PROCEDURE — 80048 BASIC METABOLIC PNL TOTAL CA: CPT

## 2020-10-21 PROCEDURE — 80202 ASSAY OF VANCOMYCIN: CPT

## 2020-10-21 PROCEDURE — 770006 HCHG ROOM/CARE - MED/SURG/GYN SEMI*

## 2020-10-21 PROCEDURE — 36415 COLL VENOUS BLD VENIPUNCTURE: CPT

## 2020-10-21 PROCEDURE — A9270 NON-COVERED ITEM OR SERVICE: HCPCS | Performed by: INTERNAL MEDICINE

## 2020-10-21 PROCEDURE — 700102 HCHG RX REV CODE 250 W/ 637 OVERRIDE(OP): Performed by: INTERNAL MEDICINE

## 2020-10-21 RX ORDER — MAGNESIUM SULFATE HEPTAHYDRATE 40 MG/ML
4 INJECTION, SOLUTION INTRAVENOUS ONCE
Status: DISCONTINUED | OUTPATIENT
Start: 2020-10-21 | End: 2020-10-21

## 2020-10-21 RX ORDER — FUROSEMIDE 20 MG/1
20 TABLET ORAL
Status: DISCONTINUED | OUTPATIENT
Start: 2020-10-22 | End: 2020-10-21

## 2020-10-21 RX ORDER — LINEZOLID 600 MG/1
600 TABLET, FILM COATED ORAL 2 TIMES DAILY
Qty: 20 TAB | Refills: 0 | Status: SHIPPED | OUTPATIENT
Start: 2020-10-21 | End: 2020-10-29

## 2020-10-21 RX ADMIN — PROPRANOLOL HYDROCHLORIDE 10 MG: 10 TABLET ORAL at 22:01

## 2020-10-21 RX ADMIN — OMEPRAZOLE 40 MG: 20 CAPSULE, DELAYED RELEASE ORAL at 17:56

## 2020-10-21 RX ADMIN — DAPTOMYCIN 350 MG: 350 INJECTION, POWDER, LYOPHILIZED, FOR SOLUTION INTRAVENOUS at 22:44

## 2020-10-21 RX ADMIN — FOLIC ACID 1 MG: 1 TABLET ORAL at 05:51

## 2020-10-21 RX ADMIN — THERA TABS 1 TABLET: TAB at 05:51

## 2020-10-21 RX ADMIN — VANCOMYCIN HYDROCHLORIDE 1250 MG: 500 INJECTION, POWDER, LYOPHILIZED, FOR SOLUTION INTRAVENOUS at 00:10

## 2020-10-21 RX ADMIN — Medication 100 MG: at 05:51

## 2020-10-21 RX ADMIN — FUROSEMIDE 40 MG: 40 TABLET ORAL at 05:51

## 2020-10-21 RX ADMIN — Medication 400 MG: at 17:56

## 2020-10-21 RX ADMIN — VANCOMYCIN HYDROCHLORIDE 1250 MG: 500 INJECTION, POWDER, LYOPHILIZED, FOR SOLUTION INTRAVENOUS at 08:00

## 2020-10-21 RX ADMIN — POTASSIUM CHLORIDE 20 MEQ: 1500 TABLET, EXTENDED RELEASE ORAL at 05:51

## 2020-10-21 RX ADMIN — OMEPRAZOLE 40 MG: 20 CAPSULE, DELAYED RELEASE ORAL at 05:51

## 2020-10-21 ASSESSMENT — LIFESTYLE VARIABLES
AUDITORY DISTURBANCES: NOT PRESENT
VISUAL DISTURBANCES: NOT PRESENT
AUDITORY DISTURBANCES: NOT PRESENT
ORIENTATION AND CLOUDING OF SENSORIUM: ORIENTED AND CAN DO SERIAL ADDITIONS
AGITATION: NORMAL ACTIVITY
PAROXYSMAL SWEATS: NO SWEAT VISIBLE
PAROXYSMAL SWEATS: NO SWEAT VISIBLE
ORIENTATION AND CLOUDING OF SENSORIUM: ORIENTED AND CAN DO SERIAL ADDITIONS
TOTAL SCORE: 2
NAUSEA AND VOMITING: NO NAUSEA AND NO VOMITING
PAROXYSMAL SWEATS: NO SWEAT VISIBLE
TREMOR: NO TREMOR
VISUAL DISTURBANCES: NOT PRESENT
VISUAL DISTURBANCES: NOT PRESENT
ANXIETY: NO ANXIETY (AT EASE)
ORIENTATION AND CLOUDING OF SENSORIUM: ORIENTED AND CAN DO SERIAL ADDITIONS
TREMOR: NO TREMOR
HEADACHE, FULLNESS IN HEAD: NOT PRESENT
AUDITORY DISTURBANCES: NOT PRESENT
ORIENTATION AND CLOUDING OF SENSORIUM: ORIENTED AND CAN DO SERIAL ADDITIONS
NAUSEA AND VOMITING: NO NAUSEA AND NO VOMITING
ANXIETY: NO ANXIETY (AT EASE)
NAUSEA AND VOMITING: NO NAUSEA AND NO VOMITING
ANXIETY: NO ANXIETY (AT EASE)
TREMOR: NO TREMOR
AUDITORY DISTURBANCES: NOT PRESENT
ANXIETY: NO ANXIETY (AT EASE)
HEADACHE, FULLNESS IN HEAD: NOT PRESENT
AGITATION: *
VISUAL DISTURBANCES: NOT PRESENT
ANXIETY: NO ANXIETY (AT EASE)
TOTAL SCORE: 0
HEADACHE, FULLNESS IN HEAD: NOT PRESENT
VISUAL DISTURBANCES: NOT PRESENT
NAUSEA AND VOMITING: NO NAUSEA AND NO VOMITING
PAROXYSMAL SWEATS: NO SWEAT VISIBLE
AGITATION: NORMAL ACTIVITY
PAROXYSMAL SWEATS: NO SWEAT VISIBLE
ORIENTATION AND CLOUDING OF SENSORIUM: ORIENTED AND CAN DO SERIAL ADDITIONS
TOTAL SCORE: 0
NAUSEA AND VOMITING: NO NAUSEA AND NO VOMITING
TOTAL SCORE: 0
AGITATION: NORMAL ACTIVITY
AGITATION: NORMAL ACTIVITY
HEADACHE, FULLNESS IN HEAD: NOT PRESENT
AUDITORY DISTURBANCES: NOT PRESENT
TREMOR: NO TREMOR
TOTAL SCORE: 0
TREMOR: NO TREMOR
HEADACHE, FULLNESS IN HEAD: NOT PRESENT

## 2020-10-21 ASSESSMENT — ENCOUNTER SYMPTOMS
FEVER: 0
CHILLS: 0
BLOOD IN STOOL: 0
ABDOMINAL PAIN: 0
FALLS: 0
BLURRED VISION: 0
VOMITING: 0
SHORTNESS OF BREATH: 0
LOSS OF CONSCIOUSNESS: 0
NAUSEA: 0
NERVOUS/ANXIOUS: 0
SEIZURES: 0

## 2020-10-21 ASSESSMENT — FIBROSIS 4 INDEX: FIB4 SCORE: 12.5

## 2020-10-21 NOTE — PROGRESS NOTES
Assumed care of patient at 1900 after receiving report from day shift RN. Patient is alert and oriented. VSS. Denies pain or additional needs. Call light within reach, hourly rounding in place.

## 2020-10-21 NOTE — PROGRESS NOTES
Janett from Lab called with critical result of platelets 44 at 0146. Critical lab result read back to Janett.   Dr. Pina notified of critical lab result at 0152.  Critical lab result read back by Dr. Green. No new orders.

## 2020-10-21 NOTE — PROGRESS NOTES
Report received from Nicholas, RN [id number]. Assumed care, assessment complete. Pt is A & O x 4.  Pt denies having any pain at this time. Fall precautions and appropriate signs in place. Pt oriented to unit routine, call light/phone system and CNA and RN extension number provided. Pt educated regarding fall precautions. Bed locked in lowest position. Pt denies any additional needs at this time. Call light within reach.

## 2020-10-21 NOTE — PROGRESS NOTES
[facility]  CONSULT  FOLLOW UP NOTE     Date of Service: 10/21/2020    Reason for Consultation: Coagulase-negative Staphylococcus bacteremia    History of Present Illness:  Jimbo Santacruz is a 45 y.o. male with a past medical history significant for alcoholic cirrhosis transferred from outside hospital upon patient's request for further evaluation of right lower quadrant abdominal pain associated with recurrent ascites requiring therapeutic paracentesis on admission, later found to be positive for coagulase-negative Staphylococcus on 10/19/2020 blood cultures x2.  10/19/2020 CT abdomen pelvis with contrast showed large ascites, nonspecific findings of small bowel mucosal thickening with no clinical correlation.  Status post paracentesis 10/19/2020, 4L removed.  Infectious disease consulted for aforementioned blood culture findings.     Patient states feeling significantly improved / back to baseline following paracentesis, with complete resolution of abdominal pain.  Describes previous abdominal pain as intermittent electric like shocks.  Denies fever, chills, chest pain, shortness of breath, nausea, vomiting, diarrhea, rash, skin wounds, illicit / IV drug use.  States last alcohol use 1 month ago, but blood alcohol noted to be 366.7 on admission.  He previously was receiving care in [place], with outpatient GI follow-up and paracentesis every 2-3 months (last outpatient paracentesis 2 months prior to this admission), but he plans to transfer care to [place] as he feels care will be better here.    Interim History:   10/21-No acute events overnight.  Blood pressure noted to be on softer side, but PO furosemide noted to have been started yesterday, patient feels back to baseline, reports good appetite, is eager to go home.  Denies fever, chills, chest pain, shortness of breath, abdominal pain, nausea, vomiting, diarrhea.  Acute kidney injury noted, with creatinine trending up from 0.65 to 1.01 today, likely  "secondary to combination of paracentesis on admission, initiation of furosemide, as well as administration of vancomycin.  10/19/2020 blood cultures growing coagulase negative Staphylococcus, further speciation expected this afternoon upon discussion with microbiology lab.  10/19/2020 peritoneal fluid cultures and 10/20/2020 blood cultures negative so far.  Transthoracic echo unremarkable, ejection fraction 60%, no significant valvular disease.     Allergies/Intolerances:  No Known Allergies    Most Recent Vital Signs:  BP (!) 99/56 Comment: Rn notified   Pulse 96   Temp 37 °C (98.6 °F) (Temporal)   Resp 17   Ht 1.727 m (5' 8\")   Wt 75.5 kg (166 lb 7.2 oz)   SpO2 95%   BMI 25.31 kg/m²   Temp  Min: 36.6 °C (97.8 °F)  Max: 37 °C (98.6 °F)    Physical Exam:  Constitutional: Comfortable.  Appears well-developed and well-nourished. No distress.  Head: Atraumatic, normocephalic.  Eyes: Conjunctivae normal, EOM intact. Pupils are equal, round, and reactive to light.   Neck: Neck supple. No limitations in active range of motion.  Cardiovascular: Normal rate, regular rhythm, S1, S2.  No murmur, gallop, or friction rub.  Pulmonary/Chest: No respiratory distress. Unlabored respiratory effort, lungs clear to auscultation. No wheezes or rales.   Abdominal: Some distension.  Paracentesis site in right lower quadrant noted, covered by clean bandage without surrounding erythema or drainage.  Soft, non tender.  Musculoskeletal: Freely moving all extremities, strength intact.  No bilateral lower extremity edema.  Neurological: Alert and oriented to person, place, and time. No focal neural deficit noted  Skin: Skin is warm and dry. No rashes or embolic phenomena noted on hands / nails or other exposed skin.  Psychiatric: Normal mood and affect. Behavior is normal.     Pertinent Lab Results:  Recent Labs     10/19/20  0116 10/20/20  0854 10/21/20  0034   WBC 6.2 3.6* 3.5*      Recent Labs     10/19/20  0116 10/20/20  0854 " 10/21/20  0034   HEMOGLOBIN 12.9* 10.8* 10.4*   HEMATOCRIT 39.3* 32.8* 31.9*   MCV 77.7* 79.6* 79.9*   MCH 25.5* 26.2* 26.1*   ANISOCYTOSIS  --  1+  --    PLATELETCT 98* 53* 44*         Recent Labs     10/19/20  0116 10/20/20  0854 10/21/20  0034   SODIUM 134* 133* 130*   POTASSIUM 4.8 4.3 3.9   CHLORIDE 102 102 98   CO2 18* 23 23   CREATININE 0.50 0.65 1.01        Invalid input(s): ALT, ALKPHOS, BILITOT, TOTALBILIRUB, BILIRUBINTOT, BILIRUBINDIR, BILIRUBININD, ALKALINEPHOS     Microbiology:  No results found for: BLOODCULTU, BLDCULT, BCHOLD     Studies:  EC-ECHOCARDIOGRAM COMPLETE W/O CONT   Final Result      US-PARACENTESIS, ABD WITH IMAGING   Final Result      1. Ultrasound-guided diagnostic paracentesis of the right lower quadrant of the abdominal wall.      2. 1000 mL of fluid withdrawn.      CT-ABDOMEN-PELVIS WITH   Final Result         1.  Small bowel mucosal thickening, appearance suggests enteritis.   2.  Changes of cirrhosis with massive abdominal ascites   3.  Esophageal and gastric varices   4.  Left inguinal hernia containing ascites   5.  Umbilical hernia containing ascites   6.  Splenomegaly      DX-CHEST-PORTABLE (1 VIEW)   Final Result         1.  No acute cardiopulmonary disease.   2.  Perihilar interstitial prominence and bronchial wall cuffing, appearance suggests changes of underlying bronchial inflammation, consider bronchitis.           Assessment and Plan:   Jimbo Santacruz is a 45 y.o. male with a past medical history significant for alcoholic cirrhosis transferred from outside hospital upon patient's request for further evaluation of right lower quadrant abdominal pain associated with recurrent ascites requiring therapeutic paracentesis on admission, later found to be positive for coagulase-negative Staphylococcus on 10/19/2020 blood cultures x2.  10/19/2020 CT abdomen pelvis with contrast showed large ascites, nonspecific findings of small bowel mucosal thickening with no clinical  correlation.  Status post paracentesis 10/19/2020, 4L removed.     Patient's hemodynamic and clinical stability, lack of systemic symptoms or complaints other than ascites-related abdominal discomfort, peritoneal fluid WBC <250, preliminary negative peritoneal fluid results argue against spontaneous bacterial peritonitis.  Unclear etiology of coagulase-negative Staphylococcus on 10/19/2020 blood cultures - no skin trauma, IV drug use, or other symptoms other than aforementioned abdominal discomfort.  Clinically not bacteremic.  Blood culture results may still be secondary to contamination from skin jerardo if further speciation of the separate sets of blood cultures differ.  10/19/2020 peritoneal fluid cultures and 10/20/2020 blood cultures negative so far.  10/20/2020 transthoracic echo unremarkable, ejection fraction 60%, no significant valvular disease.    Acute kidney injury noted, with creatinine trending up from 0.65 to 1.01, likely secondary to combination of paracentesis on admission, initiation of furosemide, as well as administration of vancomycin.  Antibiotic choice complicated by existing bone marrow suppression (likely alcohol-related), which is unfavorable for linezolid use; Bactrim is a possible alternative as well, but without strong evidence for use in literature; daptomycin is an option, but IV requirement and cost are drawbacks.  Given concurrent continued clinical stability, question of true bacteremia, anticipated further speciation of 10/19/2020 blood culture results later in afternoon of 10/21/2020, continued lack of growth on repeat 10/20/2020 blood cultures, and expected slower elimination of existing vancomycin given impaired renal function, will hold on further antibiotics at this time.    -Stop vancomycin.  -Follow up on 10/19/2020 and 10/20/2020 blood culture results.  -Follow up on 10/19/2020 peritoneal fluid results - negative so far.     Antibiotics course:  -Vancomycin  10/19/2020  -Cefazolin 10/20/2020  -Vancomycin 10/20/2020-10/21/2020

## 2020-10-21 NOTE — CARE PLAN
Problem: Communication  Goal: The ability to communicate needs accurately and effectively will improve  Outcome: PROGRESSING AS EXPECTED  Note: Educated patient to use call light when he is needing help. Call light within reach. Verbalized understanding.      Problem: Safety  Goal: Will remain free from falls  Outcome: PROGRESSING AS EXPECTED  Note: Patients bed in lowest and locked position, call light and side table within reach. Non-skid socks on and educated to use call light when he would like to get up. Verbalized understanding.       Problem: Infection  Goal: Will remain free from infection  Outcome: PROGRESSING AS EXPECTED     Problem: Venous Thromboembolism (VTW)/Deep Vein Thrombosis (DVT) Prevention:  Goal: Patient will participate in Venous Thrombosis (VTE)/Deep Vein Thrombosis (DVT)Prevention Measures  Outcome: PROGRESSING AS EXPECTED     Problem: Knowledge Deficit  Goal: Knowledge of disease process/condition, treatment plan, diagnostic tests, and medications will improve  Outcome: PROGRESSING AS EXPECTED

## 2020-10-21 NOTE — PROGRESS NOTES
Hospital Medicine Daily Progress Note    Date of Service  10/21/2020    Chief Complaint  45 y.o. male admitted 10/19/2020 with abdominal pain    Hospital Course    45-year-old male with a history of alcoholic cirrhosis complicated by esophageal varices status post banding (9/2019) and ascites who presented on 10/19/2020 with severe acute abdominal pain reported in right lower quadrant.  He denied any fever/chills or GI bleeding.  Patient denies regular alcohol use but does report 2-3 shots of alcohol on day of admission.  CT abdomen showed possible enteritis and massive ascites as well as gastric/esophageal varices.  Patient underwent paracentesis on 10/19/2020 with improvement in abdominal pain.  They removed 4 L.  His initial blood cultures grew 2/2 staph haemolyticus.  TTE normal.  Ascitic fluid cx NGTD.  ID consulted.       Interval Problem Update  - No acute overnight events  - Afebrile  - bcx finalized and both grew staph haemolyticus, with similar sensitivities   - ascitic fluid NGTD, repeat bcx negative  - TTE normal  - Cr bumped, was started on lasix yesterday  - Abdominal pain resolved  - IV infiltrated this am, abx on hold, ID to give recs on oral options otherwise may need to restart IV    Consultants/Specialty  ID    Code Status  Full Code    Disposition  TBD, likely home when medically clear    Review of Systems  Review of Systems   Constitutional: Negative for chills and fever.   HENT: Negative for hearing loss and nosebleeds.    Eyes: Negative for blurred vision.   Respiratory: Negative for shortness of breath.    Cardiovascular: Negative for chest pain and leg swelling.   Gastrointestinal: Negative for abdominal pain, blood in stool, melena, nausea and vomiting.   Genitourinary: Negative for hematuria.   Musculoskeletal: Negative for falls.   Skin: Negative for rash.   Neurological: Negative for seizures and loss of consciousness.   Psychiatric/Behavioral: The patient is not nervous/anxious.          Physical Exam  Temp:  [36.6 °C (97.8 °F)-37 °C (98.6 °F)] 37 °C (98.6 °F)  Pulse:  [74-96] 96  Resp:  [16-17] 17  BP: ()/(43-67) 99/62  SpO2:  [95 %-100 %] 95 %    Physical Exam  Vitals signs and nursing note reviewed.   Constitutional:       General: He is not in acute distress.     Appearance: He is not toxic-appearing or diaphoretic.   HENT:      Head: Normocephalic and atraumatic.      Nose: Nose normal.      Mouth/Throat:      Mouth: Mucous membranes are moist.   Eyes:      General: No scleral icterus.        Right eye: No discharge.         Left eye: No discharge.      Conjunctiva/sclera: Conjunctivae normal.   Neck:      Musculoskeletal: Normal range of motion.   Cardiovascular:      Rate and Rhythm: Normal rate and regular rhythm.      Heart sounds: Normal heart sounds.   Pulmonary:      Effort: Pulmonary effort is normal.      Breath sounds: Normal breath sounds.   Abdominal:      General: Bowel sounds are normal. There is no distension.      Palpations: Abdomen is soft.      Tenderness: There is no abdominal tenderness.   Musculoskeletal:      Right lower leg: No edema.      Left lower leg: No edema.   Skin:     General: Skin is warm and dry.   Neurological:      Mental Status: He is alert and oriented to person, place, and time.      Motor: No tremor.      Comments: No asterixis   Psychiatric:         Mood and Affect: Mood normal.         Behavior: Behavior normal.         Fluids    Intake/Output Summary (Last 24 hours) at 10/21/2020 1440  Last data filed at 10/21/2020 0930  Gross per 24 hour   Intake 540 ml   Output --   Net 540 ml       Laboratory  Recent Labs     10/19/20  0116 10/20/20  0854 10/21/20  0034   WBC 6.2 3.6* 3.5*   RBC 5.06 4.12* 3.99*   HEMOGLOBIN 12.9* 10.8* 10.4*   HEMATOCRIT 39.3* 32.8* 31.9*   MCV 77.7* 79.6* 79.9*   MCH 25.5* 26.2* 26.1*   MCHC 32.8* 32.9* 32.6*   RDW 47.7 48.3 50.1*   PLATELETCT 98* 53* 44*   MPV 9.0 9.6 9.9     Recent Labs     10/19/20  0116  10/20/20  0854 10/21/20  0034   SODIUM 134* 133* 130*   POTASSIUM 4.8 4.3 3.9   CHLORIDE 102 102 98   CO2 18* 23 23   GLUCOSE 104* 110* 101*   BUN 10 10 13   CREATININE 0.50 0.65 1.01   CALCIUM 7.9* 7.8* 7.9*     Recent Labs     10/19/20  0116   INR 1.57*               Imaging  EC-ECHOCARDIOGRAM COMPLETE W/O CONT   Final Result      US-PARACENTESIS, ABD WITH IMAGING   Final Result      1. Ultrasound-guided diagnostic paracentesis of the right lower quadrant of the abdominal wall.      2. 1000 mL of fluid withdrawn.      CT-ABDOMEN-PELVIS WITH   Final Result         1.  Small bowel mucosal thickening, appearance suggests enteritis.   2.  Changes of cirrhosis with massive abdominal ascites   3.  Esophageal and gastric varices   4.  Left inguinal hernia containing ascites   5.  Umbilical hernia containing ascites   6.  Splenomegaly      DX-CHEST-PORTABLE (1 VIEW)   Final Result         1.  No acute cardiopulmonary disease.   2.  Perihilar interstitial prominence and bronchial wall cuffing, appearance suggests changes of underlying bronchial inflammation, consider bronchitis.           Assessment/Plan  * Ascites- (present on admission)  Assessment & Plan  Pt with worsening RLQ abdominal pain and distention for the past few months  Currently denies any fevers, chills, N/V, diarrhea, or constipation  CT abd w/ possible enteritis and massive ascites as well as gastric/esophageal varices  - therapeutic paracentesis  - ascitic fluid , cx NGTD  - analgesics  - monitor BP  - c/w home Propranolol   - Alcohol cessation counseling  - Started lasix but Cr bumped, will hold for now and can restart outpatient  - needs GI outpatient follow up    Bacteremia  Assessment & Plan  2/2 blood cx growing CoN staph: staph haemolyticus  Source? No hardware, denies back pain or joint pain, abdominal pain on admission s/p para, ascitic cx NGTD  vanc-->ancef -->vanc per ID recs  Repeat bcx NGTD  TTE normal    Pancytopenia (HCC)  Assessment  & Plan  Likely in setting of cirrhosis, no neutropenia  H/H and plt dropped after procedure, but vital signs normal and no obvious bleeding  CTM    Enteritis- (present on admission)  Assessment & Plan  CT abd w/ Small bowel mucosal thickening, appearance suggests enteritis.  Pt with pinching abd pain but otherwise denies further Sx at this time  Abdominal pain resolved with paracentesis however bcx growing CoN staph, on ancef, consulting ID      Alcohol abuse- (present on admission)  Assessment & Plan  Pt with alcohol cirrhosis  Denies routine drinking, but admits he drank 2-3 shots today  Alcohol level 366  - f/u Utox  - CIWA monitoring  - Multivitamin and RALLY Bag  - Thiamine 1mg qd x4 doses and Folic Acid 1mg qd x4 doses  - Seizure/Fall precautions  - Alcohol cessation counseling      VTE: SCDs       VTE prophylaxis: SCDs (d/t thrombocytopenia)

## 2020-10-21 NOTE — CARE PLAN
Problem: Bowel/Gastric:  Goal: Normal bowel function is maintained or improved  Outcome: PROGRESSING AS EXPECTED  Note: Had BM this evening.      Problem: Pain Management  Goal: Pain level will decrease to patient's comfort goal  Outcome: PROGRESSING AS EXPECTED  Note: Denies pain at this time.

## 2020-10-21 NOTE — PROGRESS NOTES
Critical lab result called from [place] in the chemistry lab for vanco of 55.3. Critical results read back.

## 2020-10-21 NOTE — DISCHARGE PLANNING
LSW spoke with pt regarding request for release to work letter. LSW notified pt that LSW can not write letter to release him back to work, this would need to come from provider. Notified pt that LSW can provide letter of dates of admission. Pt indicated his ride was on the way and he would be leaving today. LSW discussed with pt that per report pt is not being discharged today and is still pending determination of abx course. Pt claiming he was told he would be discharged today and needs to go because he has a ride. LSW advised against this as pt is still requiring medical intervention. Attending was notified.

## 2020-10-22 PROBLEM — N17.9 AKI (ACUTE KIDNEY INJURY) (HCC): Status: ACTIVE | Noted: 2020-10-22

## 2020-10-22 LAB
ANION GAP SERPL CALC-SCNC: 12 MMOL/L (ref 7–16)
BACTERIA FLD AEROBE CULT: NORMAL
BASOPHILS # BLD AUTO: 0.5 % (ref 0–1.8)
BASOPHILS # BLD: 0.02 K/UL (ref 0–0.12)
BUN SERPL-MCNC: 23 MG/DL (ref 8–22)
CALCIUM SERPL-MCNC: 7.5 MG/DL (ref 8.5–10.5)
CHLORIDE SERPL-SCNC: 100 MMOL/L (ref 96–112)
CK SERPL-CCNC: 64 U/L (ref 0–154)
CO2 SERPL-SCNC: 19 MMOL/L (ref 20–33)
CREAT SERPL-MCNC: 3.06 MG/DL (ref 0.5–1.4)
EOSINOPHIL # BLD AUTO: 0.12 K/UL (ref 0–0.51)
EOSINOPHIL NFR BLD: 2.9 % (ref 0–6.9)
ERYTHROCYTE [DISTWIDTH] IN BLOOD BY AUTOMATED COUNT: 52.7 FL (ref 35.9–50)
GLUCOSE SERPL-MCNC: 95 MG/DL (ref 65–99)
GRAM STN SPEC: NORMAL
HCT VFR BLD AUTO: 28.2 % (ref 42–52)
HGB BLD-MCNC: 9.1 G/DL (ref 14–18)
IMM GRANULOCYTES # BLD AUTO: 0.02 K/UL (ref 0–0.11)
IMM GRANULOCYTES NFR BLD AUTO: 0.5 % (ref 0–0.9)
LYMPHOCYTES # BLD AUTO: 0.77 K/UL (ref 1–4.8)
LYMPHOCYTES NFR BLD: 18.4 % (ref 22–41)
MAGNESIUM SERPL-MCNC: 1.2 MG/DL (ref 1.5–2.5)
MCH RBC QN AUTO: 26.2 PG (ref 27–33)
MCHC RBC AUTO-ENTMCNC: 32.3 G/DL (ref 33.7–35.3)
MCV RBC AUTO: 81.3 FL (ref 81.4–97.8)
MONOCYTES # BLD AUTO: 0.44 K/UL (ref 0–0.85)
MONOCYTES NFR BLD AUTO: 10.5 % (ref 0–13.4)
NEUTROPHILS # BLD AUTO: 2.81 K/UL (ref 1.82–7.42)
NEUTROPHILS NFR BLD: 67.2 % (ref 44–72)
NRBC # BLD AUTO: 0 K/UL
NRBC BLD-RTO: 0 /100 WBC
PLATELET # BLD AUTO: 44 K/UL (ref 164–446)
POTASSIUM SERPL-SCNC: 4 MMOL/L (ref 3.6–5.5)
RBC # BLD AUTO: 3.47 M/UL (ref 4.7–6.1)
SIGNIFICANT IND 70042: NORMAL
SITE SITE: NORMAL
SODIUM SERPL-SCNC: 131 MMOL/L (ref 135–145)
SOURCE SOURCE: NORMAL
WBC # BLD AUTO: 4.2 K/UL (ref 4.8–10.8)

## 2020-10-22 PROCEDURE — A9270 NON-COVERED ITEM OR SERVICE: HCPCS | Performed by: STUDENT IN AN ORGANIZED HEALTH CARE EDUCATION/TRAINING PROGRAM

## 2020-10-22 PROCEDURE — 85025 COMPLETE CBC W/AUTO DIFF WBC: CPT

## 2020-10-22 PROCEDURE — 99233 SBSQ HOSP IP/OBS HIGH 50: CPT | Performed by: INTERNAL MEDICINE

## 2020-10-22 PROCEDURE — 700111 HCHG RX REV CODE 636 W/ 250 OVERRIDE (IP): Performed by: INTERNAL MEDICINE

## 2020-10-22 PROCEDURE — 83735 ASSAY OF MAGNESIUM: CPT

## 2020-10-22 PROCEDURE — 700102 HCHG RX REV CODE 250 W/ 637 OVERRIDE(OP): Performed by: INTERNAL MEDICINE

## 2020-10-22 PROCEDURE — 770006 HCHG ROOM/CARE - MED/SURG/GYN SEMI*

## 2020-10-22 PROCEDURE — 700105 HCHG RX REV CODE 258: Performed by: INTERNAL MEDICINE

## 2020-10-22 PROCEDURE — 99233 SBSQ HOSP IP/OBS HIGH 50: CPT | Mod: GC | Performed by: INTERNAL MEDICINE

## 2020-10-22 PROCEDURE — 82550 ASSAY OF CK (CPK): CPT

## 2020-10-22 PROCEDURE — 700102 HCHG RX REV CODE 250 W/ 637 OVERRIDE(OP): Performed by: STUDENT IN AN ORGANIZED HEALTH CARE EDUCATION/TRAINING PROGRAM

## 2020-10-22 PROCEDURE — 36415 COLL VENOUS BLD VENIPUNCTURE: CPT

## 2020-10-22 PROCEDURE — 80048 BASIC METABOLIC PNL TOTAL CA: CPT

## 2020-10-22 PROCEDURE — A9270 NON-COVERED ITEM OR SERVICE: HCPCS | Performed by: INTERNAL MEDICINE

## 2020-10-22 RX ORDER — SODIUM CHLORIDE, SODIUM LACTATE, POTASSIUM CHLORIDE, CALCIUM CHLORIDE 600; 310; 30; 20 MG/100ML; MG/100ML; MG/100ML; MG/100ML
INJECTION, SOLUTION INTRAVENOUS CONTINUOUS
Status: DISCONTINUED | OUTPATIENT
Start: 2020-10-22 | End: 2020-10-23

## 2020-10-22 RX ORDER — MAGNESIUM SULFATE HEPTAHYDRATE 40 MG/ML
4 INJECTION, SOLUTION INTRAVENOUS ONCE
Status: COMPLETED | OUTPATIENT
Start: 2020-10-22 | End: 2020-10-22

## 2020-10-22 RX ADMIN — Medication 400 MG: at 17:29

## 2020-10-22 RX ADMIN — THERA TABS 1 TABLET: TAB at 04:46

## 2020-10-22 RX ADMIN — OMEPRAZOLE 40 MG: 20 CAPSULE, DELAYED RELEASE ORAL at 17:29

## 2020-10-22 RX ADMIN — SODIUM CHLORIDE, POTASSIUM CHLORIDE, SODIUM LACTATE AND CALCIUM CHLORIDE: 600; 310; 30; 20 INJECTION, SOLUTION INTRAVENOUS at 16:51

## 2020-10-22 RX ADMIN — OMEPRAZOLE 40 MG: 20 CAPSULE, DELAYED RELEASE ORAL at 04:45

## 2020-10-22 RX ADMIN — Medication 100 MG: at 04:46

## 2020-10-22 RX ADMIN — FOLIC ACID 1 MG: 1 TABLET ORAL at 04:46

## 2020-10-22 RX ADMIN — Medication 400 MG: at 04:46

## 2020-10-22 RX ADMIN — MAGNESIUM SULFATE IN WATER 4 G: 40 INJECTION, SOLUTION INTRAVENOUS at 11:15

## 2020-10-22 RX ADMIN — PROPRANOLOL HYDROCHLORIDE 10 MG: 10 TABLET ORAL at 21:09

## 2020-10-22 ASSESSMENT — ENCOUNTER SYMPTOMS
CHILLS: 0
VOMITING: 0
FALLS: 0
SHORTNESS OF BREATH: 0
FEVER: 0
LOSS OF CONSCIOUSNESS: 0
BLURRED VISION: 0
NERVOUS/ANXIOUS: 0
NAUSEA: 0
SEIZURES: 0
BLOOD IN STOOL: 0
ABDOMINAL PAIN: 0

## 2020-10-22 ASSESSMENT — LIFESTYLE VARIABLES
VISUAL DISTURBANCES: NOT PRESENT
NAUSEA AND VOMITING: NO NAUSEA AND NO VOMITING
AUDITORY DISTURBANCES: NOT PRESENT
PAROXYSMAL SWEATS: NO SWEAT VISIBLE
HEADACHE, FULLNESS IN HEAD: NOT PRESENT
ORIENTATION AND CLOUDING OF SENSORIUM: ORIENTED AND CAN DO SERIAL ADDITIONS
ANXIETY: NO ANXIETY (AT EASE)
TREMOR: NO TREMOR
AGITATION: NORMAL ACTIVITY
TOTAL SCORE: 0

## 2020-10-22 NOTE — PROGRESS NOTES
[facility]  CONSULT  FOLLOW UP NOTE     Date of Service: 10/21/2020    Reason for Consultation: Coagulase-negative Staphylococcus bacteremia    History of Present Illness:  Jimbo Santacruz is a 45 y.o. male with a past medical history significant for alcoholic cirrhosis transferred from outside hospital upon patient's request for further evaluation of right lower quadrant abdominal pain associated with recurrent ascites requiring therapeutic paracentesis on admission, later found to be positive for coagulase-negative Staphylococcus on 10/19/2020 blood cultures x2.  10/19/2020 CT abdomen pelvis with contrast showed large ascites, nonspecific findings of small bowel mucosal thickening with no clinical correlation.  Status post paracentesis 10/19/2020, 4L removed.  Infectious disease consulted for aforementioned blood culture findings.     Patient states feeling significantly improved / back to baseline following paracentesis, with complete resolution of abdominal pain.  Describes previous abdominal pain as intermittent electric like shocks.  Denies fever, chills, chest pain, shortness of breath, nausea, vomiting, diarrhea, rash, skin wounds, illicit / IV drug use.  States last alcohol use 1 month ago, but blood alcohol noted to be 366.7 on admission.  He previously was receiving care in [place], with outpatient GI follow-up and paracentesis every 2-3 months (last outpatient paracentesis 2 months prior to this admission), but he plans to transfer care to [place] as he feels care will be better here.    Interim History:   10/21-No acute events overnight.  Blood pressure noted to be on softer side, but PO furosemide noted to have been started yesterday, patient feels back to baseline, reports good appetite, is eager to go home.  Denies fever, chills, chest pain, shortness of breath, abdominal pain, nausea, vomiting, diarrhea.  Acute kidney injury noted, with creatinine trending up from 0.65 to 1.01 today, likely  "secondary to combination of paracentesis on admission, initiation of furosemide, as well as administration of vancomycin.  10/19/2020 blood cultures growing coagulase negative Staphylococcus, further speciation expected this afternoon upon discussion with microbiology lab.  10/19/2020 peritoneal fluid cultures and 10/20/2020 blood cultures negative so far.  Transthoracic echo unremarkable, ejection fraction 60%, no significant valvular disease.  10/22-10/19/2020 blood cultures x2 noted to grow Staphylococcus haemolyticus, possibly introduced via peripheral IV insertion at outside hospital prior to transfer to [facility].  10/20/2020 blood cultures remain negative.  Creatinine noted to have increased to 3.06 from 1.01 yesterday.  Patient clinically has no complaints, states still making good amount of urine.  Vancomycin trough noted to still be elevated at 46.5 10/21/2020 evening, status post 1 dose of daptomycin 10/21/2020 evening.     Allergies/Intolerances:  No Known Allergies    Most Recent Vital Signs:  /65   Pulse 83   Temp 36.2 °C (97.1 °F) (Temporal)   Resp 14   Ht 1.727 m (5' 8\")   Wt 82 kg (180 lb 12.4 oz)   SpO2 98%   BMI 27.49 kg/m²   Temp  Min: 36.6 °C (97.8 °F)  Max: 37 °C (98.6 °F)    Physical Exam:  Constitutional: Comfortable.  Sitting up in chair.  Appears well-developed and well-nourished. No distress.  Head: Atraumatic, normocephalic.  Eyes: Conjunctivae normal, EOM intact. Pupils are equal, round, and reactive to light.   Neck: Neck supple. No limitations in active range of motion.  Cardiovascular: Normal rate, regular rhythm, S1, S2.  No murmur, gallop, or friction rub.  Pulmonary/Chest: No respiratory distress. Unlabored respiratory effort, lungs clear to auscultation. No wheezes or rales.   Abdominal: Some distension.  Soft, non tender.  Musculoskeletal: Freely moving all extremities, strength intact.  No bilateral lower extremity edema.  Neurological: Alert and oriented to person, " place, and time. No focal neural deficit noted  Skin: Skin is warm and dry. No rashes or embolic phenomena noted on hands / nails or other exposed skin.  Psychiatric: Normal mood and affect. Behavior is normal.     Pertinent Lab Results:  Recent Labs     10/19/20  0116 10/20/20  0854 10/21/20  0034   WBC 6.2 3.6* 3.5*      Recent Labs     10/20/20  0854 10/21/20  0034 10/22/20  0047   HEMOGLOBIN 10.8* 10.4* 9.1*   HEMATOCRIT 32.8* 31.9* 28.2*   MCV 79.6* 79.9* 81.3*   MCH 26.2* 26.1* 26.2*   ANISOCYTOSIS 1+  --   --    PLATELETCT 53* 44* 44*         Recent Labs     10/19/20  0116 10/20/20  0854 10/21/20  0034   SODIUM 134* 133* 130*   POTASSIUM 4.8 4.3 3.9   CHLORIDE 102 102 98   CO2 18* 23 23   CREATININE 0.50 0.65 1.01        Invalid input(s): ALT, ALKPHOS, BILITOT, TOTALBILIRUB, BILIRUBINTOT, BILIRUBINDIR, BILIRUBININD, ALKALINEPHOS     Microbiology:  No results found for: BLOODCULTU, BLDCULT, BCHOLD     Studies:  EC-ECHOCARDIOGRAM COMPLETE W/O CONT   Final Result      US-PARACENTESIS, ABD WITH IMAGING   Final Result      1. Ultrasound-guided diagnostic paracentesis of the right lower quadrant of the abdominal wall.      2. 1000 mL of fluid withdrawn.      CT-ABDOMEN-PELVIS WITH   Final Result         1.  Small bowel mucosal thickening, appearance suggests enteritis.   2.  Changes of cirrhosis with massive abdominal ascites   3.  Esophageal and gastric varices   4.  Left inguinal hernia containing ascites   5.  Umbilical hernia containing ascites   6.  Splenomegaly      DX-CHEST-PORTABLE (1 VIEW)   Final Result         1.  No acute cardiopulmonary disease.   2.  Perihilar interstitial prominence and bronchial wall cuffing, appearance suggests changes of underlying bronchial inflammation, consider bronchitis.           Assessment and Plan:   Jimbo Santacruz is a 45 y.o. male with a past medical history significant for alcoholic cirrhosis transferred from outside hospital upon patient's request for further  evaluation of right lower quadrant abdominal pain associated with recurrent ascites requiring therapeutic paracentesis on admission, later found to be positive for coagulase-negative Staphylococcus on 10/19/2020 blood cultures x2.  10/19/2020 CT abdomen pelvis with contrast showed large ascites, nonspecific findings of small bowel mucosal thickening with no clinical correlation.  Status post paracentesis 10/19/2020, 4L removed.     Patient's hemodynamic and clinical stability, lack of systemic symptoms or complaints other than ascites-related abdominal discomfort, peritoneal fluid WBC <250, 10/19/2020 negative peritoneal fluid culture argue against spontaneous bacterial peritonitis.  Unclear etiology of coagulase-negative Staphylococcus on 10/19/2020 blood cultures - no skin trauma, IV drug use, or other symptoms other than aforementioned abdominal discomfort.  Possibly introduced via peripheral IV when inserted at outside hospital prior to transfer.  Clinically not bacteremic.  However, given both sets of blood cultures positive for Staphylococcus haemolyticus, will treat as true bacteremia.  10/20/2020 blood cultures negative so far.  10/20/2020 transthoracic echo unremarkable, ejection fraction 60%, no significant valvular disease.    Acute kidney injury noted, with creatinine continuing to trend up, likely secondary to combination of paracentesis on admission, initiation of furosemide, as well as administration of vancomycin.  Question of contrast-induced nephropathy as well, exacerbated by periodic hypotension.  Antibiotic choice complicated by existing bone marrow suppression (likely alcohol-related), which is unfavorable for prolonged linezolid use; Bactrim is a possible alternative as well, but without strong evidence for use in literature; daptomycin is an option, but IV requirement and cost are drawbacks.  Transitioned from vancomycin to daptomycin 10/21/2020, s/p 1 dose of daptomycin.  However, given  worsening renal function, will transition to oral linezolid to complete total 7-day course of antibiotics.    -Initiate p.o. linezolid 600 mg every 12 hours evening of 10/23/2020 to complete total 7 days of antibiotics (end 10/26/2020).  -Follow up on 10/20/2020 blood culture results-negative so far.  -Infectious disease will sign off at this time.     Antibiotics course:  -Vancomycin 10/19/2020  -Cefazolin 10/20/2020  -Vancomycin 10/20/2020-10/21/2020  -Daptomycin 10/21/2020

## 2020-10-22 NOTE — PROGRESS NOTES
Hospital Medicine Daily Progress Note    Date of Service  10/22/2020    Chief Complaint  45 y.o. male admitted 10/19/2020 with abdominal pain    Hospital Course    45-year-old male with a history of alcoholic cirrhosis complicated by esophageal varices status post banding (9/2019) and ascites who presented on 10/19/2020 with severe acute abdominal pain reported in right lower quadrant.  He denied any fever/chills or GI bleeding.  Patient denies regular alcohol use but does report 2-3 shots of alcohol on day of admission.  CT abdomen showed possible enteritis and massive ascites as well as gastric/esophageal varices.  Patient underwent paracentesis on 10/19/2020 with improvement in abdominal pain.  They removed 4 L.  His initial blood cultures grew 2/2 staph haemolyticus.  TTE normal.  Ascitic fluid cx NGTD.  ID consulted. His course was c/b AROLDO.        Interval Problem Update  - Developed AROLDO, very high vanc trough, still urinating, now on strict I/Os, CT abdomen on admission with normal kidneys/ureters  - Daptomycin dc'ed, CK normal  - Afebrile  - Denies abdominal pain, no dysuria or hematuria  - asking for note for work    Consultants/Specialty  ID    Code Status  Full Code    Disposition  TBD, likely home when medically clear    Review of Systems  Review of Systems   Constitutional: Negative for chills and fever.   HENT: Negative for hearing loss and nosebleeds.    Eyes: Negative for blurred vision.   Respiratory: Negative for shortness of breath.    Cardiovascular: Negative for chest pain and leg swelling.   Gastrointestinal: Negative for abdominal pain, blood in stool, melena, nausea and vomiting.   Genitourinary: Negative for hematuria.   Musculoskeletal: Negative for falls.   Skin: Negative for rash.   Neurological: Negative for seizures and loss of consciousness.   Psychiatric/Behavioral: The patient is not nervous/anxious.         Physical Exam  Temp:  [36.2 °C (97.1 °F)-37 °C (98.6 °F)] 36.2 °C (97.1  °F)  Pulse:  [78-86] 83  Resp:  [14-18] 14  BP: ()/(53-61) 97/53  SpO2:  [98 %-100 %] 98 %    Physical Exam  Vitals signs and nursing note reviewed.   Constitutional:       General: He is not in acute distress.     Appearance: He is not toxic-appearing or diaphoretic.   HENT:      Head: Normocephalic and atraumatic.      Nose: Nose normal.      Mouth/Throat:      Mouth: Mucous membranes are moist.   Eyes:      General: No scleral icterus.        Right eye: No discharge.         Left eye: No discharge.      Conjunctiva/sclera: Conjunctivae normal.   Neck:      Musculoskeletal: Normal range of motion.   Cardiovascular:      Rate and Rhythm: Normal rate and regular rhythm.      Heart sounds: Normal heart sounds.   Pulmonary:      Effort: Pulmonary effort is normal.      Breath sounds: Normal breath sounds.   Abdominal:      General: Bowel sounds are normal. There is no distension.      Palpations: Abdomen is soft.      Tenderness: There is no abdominal tenderness.   Musculoskeletal:      Right lower leg: No edema.      Left lower leg: No edema.   Skin:     General: Skin is warm and dry.   Neurological:      Mental Status: He is alert and oriented to person, place, and time.      Motor: No tremor.      Gait: Gait normal.   Psychiatric:         Mood and Affect: Mood normal.         Behavior: Behavior normal.         Fluids    Intake/Output Summary (Last 24 hours) at 10/22/2020 1335  Last data filed at 10/22/2020 0930  Gross per 24 hour   Intake 650 ml   Output --   Net 650 ml       Laboratory  Recent Labs     10/20/20  0854 10/21/20  0034 10/22/20  0047   WBC 3.6* 3.5* 4.2*   RBC 4.12* 3.99* 3.47*   HEMOGLOBIN 10.8* 10.4* 9.1*   HEMATOCRIT 32.8* 31.9* 28.2*   MCV 79.6* 79.9* 81.3*   MCH 26.2* 26.1* 26.2*   MCHC 32.9* 32.6* 32.3*   RDW 48.3 50.1* 52.7*   PLATELETCT 53* 44* 44*   MPV 9.6 9.9  --      Recent Labs     10/21/20  1516 10/21/20  1811 10/22/20  0047   SODIUM 132* 131* 131*   POTASSIUM 4.4 4.2 4.0  "  CHLORIDE 100 98 100   CO2 20 20 19*   GLUCOSE 157* 129* 95   BUN 18 19 23*   CREATININE 2.54* 2.75* 3.06*   CALCIUM 8.2* 8.2* 7.5*              Results     Procedure Component Value Units Date/Time    Fluid Culture with Gram Stain [[id number]] Collected: 10/19/20 1140    Order Status: Completed Specimen: Body Fluid from Peritoneal Fluid Updated: 10/22/20 0811     Significant Indicator NEG     Source BF     Site Ascites Fluid     Culture Result No growth at 72 hours.     Gram Stain Result Rare WBCs.  No organisms seen.      Narrative:      Peritoneal Fluid    URINE CULTURE-EXISTING-LESS THAN 48 HOURS [[id number]] Collected: 10/20/20 0923    Order Status: Completed Specimen: Urine, Clean Catch Updated: 10/21/20 1743    Narrative:      Indication for culture:->Evaluation for sepsis without a  clear source of infection    BLOOD CULTURE [[id number]]  (Abnormal) Collected: 10/19/20 0145    Order Status: Completed Specimen: Blood from Peripheral Updated: 10/21/20 1241     Significant Indicator POS     Source BLD     Site PERIPHERAL     Culture Result Growth detected by Bactec instrument. 10/20/2020  06:44      Staphylococcus haemolyticus  See previous culture for sensitivity report.      Narrative:      CALL  Denise  MS5 tel. [phone],  CALLED  MS5 tel. [phone] 10/20/2020, 06:49, RB PERF. RESULTS CALLED  TO:[phone] RN  Per Hospital Policy: Only change Specimen Src: to \"Line\" if  specified by physician order.  Left AC    BLOOD CULTURE [[id number]]  (Abnormal)  (Susceptibility) Collected: 10/19/20 0300    Order Status: Completed Specimen: Blood from Peripheral Updated: 10/21/20 1240     Significant Indicator POS     Source BLD     Site PERIPHERAL     Culture Result Growth detected by Bactec instrument. 10/19/2020  21:47   CORRECTED REPORT  Correct result:  Negative for Staphylococcus aureus and MRSA by PCR. Correlate  ongoing need for antibiotics with clinical condition.  Erroneous result:Staphylococcus aureus " "(methicillin  sensitive)  detected by PCR.        Staphylococcus haemolyticus  This isolate is presumed to be clindamycin resistant based on  detection of inducible resistance.  Clindamycin may still  be effective in some patients.      Narrative:      CALL  Denise  MS5 tel. [phone],  CALLED  MS5 tel. [phone] 10/20/2020, 07:44, RB PERF. RESULTS CALLED  TO:[phone] and Cindy pharm  Per Hospital Policy: Only change Specimen Src: to \"Line\" if  specified by physician order.  No site indicated    Susceptibility     Staphylococcus haemolyticus (1)     Antibiotic Interpretation Microscan Method Status    Azithromycin Resistant >4 mcg/mL SAUL Final    Clindamycin Resistant <=0.5 mcg/mL SAUL Final    Cefazolin Resistant <=8 mcg/mL SAUL Final    Daptomycin Sensitive <=1 mcg/mL SAUL Final    Ampicillin/sulbactam Resistant <=8/4 mcg/mL SAUL Final    Erythromycin Resistant >4 mcg/mL SAUL Final    Vancomycin Sensitive 1 mcg/mL SAUL Final    Oxacillin Resistant >2 mcg/mL SAUL Final    Penicillin Resistant 8 mcg/mL SAUL Final    Trimeth/Sulfa Sensitive <=0.5/9.5 mcg/mL SAUL Final    Tetracycline Resistant >8 mcg/mL SAUL Final                   BLOOD CULTURE [[id number]] Collected: 10/20/20 0854    Order Status: Completed Specimen: Blood from Peripheral Updated: 10/21/20 0712     Significant Indicator NEG     Source BLD     Site PERIPHERAL     Culture Result No Growth  Note: Blood cultures are incubated for 5 days and  are monitored continuously.Positive blood cultures  are called to the RN and reported as soon as  they are identified.      Narrative:      Per Hospital Policy: Only change Specimen Src: to \"Line\" if  specified by physician order.  No site indicated    BLOOD CULTURE [[id number]] Collected: 10/20/20 1215    Order Status: Completed Specimen: Blood from Peripheral Updated: 10/21/20 0712     Significant Indicator NEG     Source BLD     Site PERIPHERAL     Culture Result No Growth  Note: Blood cultures are incubated for 5 days " "and  are monitored continuously.Positive blood cultures  are called to the RN and reported as soon as  they are identified.      Narrative:      Per Hospital Policy: Only change Specimen Src: to \"Line\" if  specified by physician order.  Right AC    MRSA By PCR (Amp) [[id number]] Collected: 10/20/20 0001    Order Status: Completed Specimen: Respirate from Nares Updated: 10/20/20 1550     Significant Indicator NEG     Source RESP     Site NARES     MRSA PCR Negative for MRSA by PCR.    Narrative:      Collected By:[id number] YUDY BAKER  Collected By:[id number] YUDY BAKER    URINALYSIS CULTURE, IF INDICATED [[id number]]  (Abnormal) Collected: 10/20/20 0923    Order Status: Completed Specimen: Urine, Clean Catch Updated: 10/20/20 1037     Color Karla     Character Clear     Specific Gravity 1.025     Ph 5.5     Glucose Negative mg/dL      Ketones Trace mg/dL      Protein Negative mg/dL      Bilirubin Moderate     Urobilinogen, Urine 0.2     Nitrite Positive     Leukocyte Esterase Negative     Occult Blood Moderate     Micro Urine Req Microscopic    COVID/SARS CoV-2 PCR [[id number]] Collected: 10/20/20 0001    Order Status: Completed Specimen: Respirate from Nasopharyngeal Updated: 10/20/20 0011     COVID Order Status Received     Comment: The order for SARS CoV-2 testing has been received by the  Laboratory. This result is neither positive nor negative.  Final results of testing will report in 24-48 hours, separately.         Narrative:      Is patient being admitted?->Yes  Does this patient meet criteria for Rush/Cepheid per [facility]  Inpatient Workflow? (See workflow link below)->No  Expected turn around time?->Routine (In-House PCR up to 24  hours)  Is this the patients First SARS CoV-2 test?->Unknown  Is this patient employed in healthcare?->No  Is the patient symptomatic as defined by the CDC?->No  Is the patient hospitalized?->Yes  Is the patient in the ICU?->No  Is the patient a resident in a congregate care " setting?->No  Is the patient pregnant?->No    GRAM STAIN [[id number]] Collected: 10/19/20 1140    Order Status: Completed Specimen: Body Fluid Updated: 10/19/20 1658     Significant Indicator .     Source BF     Site Ascites Fluid     Gram Stain Result Rare WBCs.  No organisms seen.      Narrative:      Peritoneal Fluid    SARS-CoV-2, PCR (In-House) [[id number]] Collected: 10/19/20 0145    Order Status: Completed Updated: 10/19/20 1259     SARS-CoV-2 Source NP Swab     SARS-CoV-2 by PCR NotDetected     Comment: [facility] providers: PLEASE REFER TO DE-ESCALATION AND RETESTING PROTOCOL  on [email or web address]  **The TaqPath COVID-19 SARS-CoV-2 test has been made available for use under  the Emergency Use Authorization (EUA) only.         Narrative:      Is patient being admitted?->Yes  Does this patient meet criteria for Rush/Cepheid per [facility]  Inpatient Workflow? (See workflow link below)->No  Expected turn around time?->Routine (In-House PCR up to 24  hours)  Is this the patients First SARS CoV-2 test?->Unknown  Is this patient employed in healthcare?->No  Is the patient symptomatic as defined by the CDC?->No  Is the patient hospitalized?->Yes  Is the patient in the ICU?->No  Is the patient a resident in a congregate care setting?->No  Is the patient pregnant?->No    COVID/SARS CoV-2 PCR [[id number]] Collected: 10/19/20 0145    Order Status: Completed Specimen: Respirate from Nasopharyngeal Updated: 10/19/20 0156     COVID Order Status Received     Comment: The order for SARS CoV-2 testing has been received by the  Laboratory. This result is neither positive nor negative.  Final results of testing will report in 24-48 hours, separately.         Narrative:      Is patient being admitted?->Yes  Does this patient meet criteria for Rush/Cepheid per [facility]  Inpatient Workflow? (See workflow link below)->No  Expected turn around time?->Routine (In-House PCR up to 24  hours)  Is this the patients First SARS CoV-2  test?->Unknown  Is this patient employed in healthcare?->No  Is the patient symptomatic as defined by the CDC?->No  Is the patient hospitalized?->Yes  Is the patient in the ICU?->No  Is the patient a resident in a congregate care setting?->No  Is the patient pregnant?->No    BLOOD CULTURE (Child) [[id number]] Collected: 10/19/20 0127    Order Status: Canceled Specimen: Other from Peripheral                 Imaging  EC-ECHOCARDIOGRAM COMPLETE W/O CONT   Final Result      US-PARACENTESIS, ABD WITH IMAGING   Final Result      1. Ultrasound-guided diagnostic paracentesis of the right lower quadrant of the abdominal wall.      2. 1000 mL of fluid withdrawn.      CT-ABDOMEN-PELVIS WITH   Final Result         1.  Small bowel mucosal thickening, appearance suggests enteritis.   2.  Changes of cirrhosis with massive abdominal ascites   3.  Esophageal and gastric varices   4.  Left inguinal hernia containing ascites   5.  Umbilical hernia containing ascites   6.  Splenomegaly      DX-CHEST-PORTABLE (1 VIEW)   Final Result         1.  No acute cardiopulmonary disease.   2.  Perihilar interstitial prominence and bronchial wall cuffing, appearance suggests changes of underlying bronchial inflammation, consider bronchitis.           Assessment/Plan  * Ascites- (present on admission)  Assessment & Plan  Pt with worsening RLQ abdominal pain and distention for the past few months  Currently denies any fevers, chills, N/V, diarrhea, or constipation  CT abd w/ possible enteritis and massive ascites as well as gastric/esophageal varices  - therapeutic/diagnostic paracentesis  - ascitic fluid , cx NG  - analgesics  - monitor BP  - c/w home Propranolol   - Alcohol cessation counseling  - Takes lasix outpatient, restarted but then Cr increased so now on hold  - needs GI outpatient follow up    AROLDO (acute kidney injury) (HCC)  Assessment & Plan  Likely in the setting of high vanc trough (> 40) +/- lasix and paracentesis (removed  4L)  CT abdomen/pelvis with normal kidneys and ureters  Still urinating but don't have accurate I/O, now on strict I/Os  UA with positive nitrite, moderate occult blood, negative protein, ucx pending, denies dysuria  Will start some IVF to see if any improvement  If Cr worsens or UOP decreases will consult nephrology    Bacteremia  Assessment & Plan  2/2 blood cx growing CoN staph: staph haemolyticus  Source? No hardware, denies back pain or joint pain, abdominal pain on admission s/p para, ascitic cx NGTD  vanc-->ancef -->vanc-->daptomycin, now off since vanc trough so high  Repeat bcx NGTD  TTE normal    Pancytopenia (HCC)  Assessment & Plan  Likely in setting of cirrhosis, no neutropenia  H/H and plt dropped after procedure, but vital signs normal and no obvious bleeding  CTM    Enteritis- (present on admission)  Assessment & Plan  CT abd w/ Small bowel mucosal thickening, appearance suggests enteritis.  Pt with pinching abd pain but otherwise denies further Sx at this time  Abdominal pain resolved with paracentesis       Alcohol abuse- (present on admission)  Assessment & Plan  Pt with alcohol cirrhosis  Denies routine drinking, but admits he drank 2-3 shots on day of admission  Alcohol level 366  - s/p CIWA monitoring  - Multivitamin and RALLY Bag  - Thiamine 1mg qd x4 doses and Folic Acid 1mg qd x4 doses  - Seizure/Fall precautions  - Alcohol cessation counseling      VTE: SCDs       VTE prophylaxis: SCDs (d/t thrombocytopenia), ambulatory

## 2020-10-22 NOTE — CARE PLAN
Problem: Communication  Goal: The ability to communicate needs accurately and effectively will improve  Intervention: [place] patient and significant other/support system to call light to alert staff of needs  Note: Pt educated on using call light when needing assistance      Problem: Bowel/Gastric:  Goal: Normal bowel function is maintained or improved  Intervention: Educate patient and significant other/support system about diet, fluid intake, medications and activity to promote bowel function  Note: Patient educated on fluid intake and how it helps with BMs

## 2020-10-22 NOTE — PROGRESS NOTES
Received report on pt. Assumed care of Pt at 1900. Patient assessed at 1930. AAOx4. Ambulates self. Pt educated on call light use and plan of care. Call light in reach, bed locked, lowered.

## 2020-10-22 NOTE — ASSESSMENT & PLAN NOTE
Likely due to vancomycin and large volume paracentesis  Imaging showing medical renal disease, otherwise normal.  Cr 3.77-->4.19-->4.27-->3.98-->3.15 (downtrending)  Improved quantity of urination, as per patient  -continue to monitor, avoid nephrotoxins

## 2020-10-22 NOTE — PROGRESS NOTES
Report received from HEENA Huff at 0700. Assumed care, assessment complete. Pt is A & O x 4.  Pt denies having any pain at this time. Fall precautions and appropriate signs in place. Pt oriented to unit routine, call light/phone system and CNA and RN extension number provided. Pt educated regarding fall precautions. Bed locked in lowest position. Pt denies any additional needs at this time. Call light within reach.

## 2020-10-22 NOTE — DISCHARGE PLANNING
Care Transition Team Assessment  LSW met with pt to complete assessment and discuss discharge plans/barriers. Pt's goal is to return home. He has been eager to discharge and reported concerns related to his job. Pt would like LSW to send letter to his employer notifying of current admission. LSW provided pt with medical release. Pt completed/signed. LSW will send letter to employer per pt's request indicating date of admission and that pt is still currently admitted.     Pt is a 45 year old  male who reported he was previously independent with his ADL/iADL needs and no DME in the home. Pt denies any financial barriers. Per chart review pt does consume alcohol however pt denied heavy consumption. Pt denies any behavioral health needs.     Information Source  Orientation : Oriented x 4  Information Given By: Patient  Informant's Name: Jimbo  Who is responsible for making decisions for patient? : Patient    Readmission Evaluation  Is this a readmission?: No    Elopement Risk  Legal Hold: No  Ambulatory or Self Mobile in Wheelchair: No-Not an Elopement Risk  Disoriented: No  Psychiatric Symptoms: None  History of Wandering: No  Elopement this Admit: No  Vocalizing Wanting to Leave: No  Displays Behaviors, Body Language Wanting to Leave: No-Not at Risk for Elopement  Elopement Risk: Not at Risk for Elopement    Interdisciplinary Discharge Planning  Primary Care Physician: Dr. Wilde  Lives with - Patient's Self Care Capacity: Other (Comments)  Patient or legal guardian wants to designate a caregiver: No  Support Systems: Family Member(s)  Housing / Facility: [address] [facility]  Prior Services: None    Discharge Preparedness  What is your plan after discharge?: Home with help  What are your discharge supports?: Spouse  Prior Functional Level: Ambulatory, Drives Self, Independent with Activities of Daily Living, Independent with Medication Management  Difficulity with ADLs: None  Difficulity with IADLs:  None    Functional Assesment  Prior Functional Level: Ambulatory, Drives Self, Independent with Activities of Daily Living, Independent with Medication Management    Finances  Financial Barriers to Discharge: No  Prescription Coverage: Yes    Advance Directive  Advance Directive?: None    Domestic Abuse  Have you ever been the victim of abuse or violence?: No  Physical Abuse or Sexual Abuse: No  Verbal Abuse or Emotional Abuse: No  Possible Abuse/Neglect Reported to:: Not Applicable    Psychological Assessment  History of Substance Abuse: Alcohol  Substance Abuse Comments: Pt denies significant use  History of Psychiatric Problems: No  Newly Diagnosed Illness: Yes    Anticipated Discharge Information  Discharge Disposition: Discharged to home/self care (01)  Discharge Address: [address] [place] [id number]  Discharge Contact Phone Number: [phone]

## 2020-10-22 NOTE — CARE PLAN
Problem: Communication  Goal: The ability to communicate needs accurately and effectively will improve  Outcome: PROGRESSING AS EXPECTED  Note: Educated patient to use call light when he is needing help. Call light within reach. Verbalized understanding.      Problem: Bowel/Gastric:  Goal: Normal bowel function is maintained or improved  Outcome: PROGRESSING AS EXPECTED     Problem: Knowledge Deficit  Goal: Knowledge of disease process/condition, treatment plan, diagnostic tests, and medications will improve  Outcome: PROGRESSING AS EXPECTED     Problem: Pain Management  Goal: Pain level will decrease to patient's comfort goal  Outcome: PROGRESSING AS EXPECTED

## 2020-10-22 NOTE — PROGRESS NOTES
Rayna from Lab called with critical result of PLT 44  at 0138. Critical lab result read back to rayna.   Dr. Pina notified of critical lab result at 0150.  Critical lab result read back by Dr. Pina.

## 2020-10-23 ENCOUNTER — APPOINTMENT (OUTPATIENT)
Dept: RADIOLOGY | Facility: MEDICAL CENTER | Age: 45
DRG: 432 | End: 2020-10-23
Attending: INTERNAL MEDICINE
Payer: COMMERCIAL

## 2020-10-23 LAB
ANION GAP SERPL CALC-SCNC: 13 MMOL/L (ref 7–16)
BACTERIA UR CULT: NORMAL
BASOPHILS # BLD AUTO: 0.8 % (ref 0–1.8)
BASOPHILS # BLD: 0.03 K/UL (ref 0–0.12)
BUN SERPL-MCNC: 32 MG/DL (ref 8–22)
CALCIUM SERPL-MCNC: 7.8 MG/DL (ref 8.5–10.5)
CHLORIDE SERPL-SCNC: 100 MMOL/L (ref 96–112)
CHLORIDE UR-SCNC: 20 MMOL/L
CO2 SERPL-SCNC: 18 MMOL/L (ref 20–33)
CREAT SERPL-MCNC: 3.85 MG/DL (ref 0.5–1.4)
CREAT UR-MCNC: 217.75 MG/DL
EOSINOPHIL # BLD AUTO: 0.1 K/UL (ref 0–0.51)
EOSINOPHIL NFR BLD: 2.6 % (ref 0–6.9)
ERYTHROCYTE [DISTWIDTH] IN BLOOD BY AUTOMATED COUNT: 54.3 FL (ref 35.9–50)
GLUCOSE SERPL-MCNC: 117 MG/DL (ref 65–99)
HCT VFR BLD AUTO: 28.5 % (ref 42–52)
HGB BLD-MCNC: 9.3 G/DL (ref 14–18)
IMM GRANULOCYTES # BLD AUTO: 0.01 K/UL (ref 0–0.11)
IMM GRANULOCYTES NFR BLD AUTO: 0.3 % (ref 0–0.9)
LYMPHOCYTES # BLD AUTO: 0.75 K/UL (ref 1–4.8)
LYMPHOCYTES NFR BLD: 19.6 % (ref 22–41)
MAGNESIUM SERPL-MCNC: 2.2 MG/DL (ref 1.5–2.5)
MCH RBC QN AUTO: 26.8 PG (ref 27–33)
MCHC RBC AUTO-ENTMCNC: 32.6 G/DL (ref 33.7–35.3)
MCV RBC AUTO: 82.1 FL (ref 81.4–97.8)
MONOCYTES # BLD AUTO: 0.5 K/UL (ref 0–0.85)
MONOCYTES NFR BLD AUTO: 13.1 % (ref 0–13.4)
NEUTROPHILS # BLD AUTO: 2.43 K/UL (ref 1.82–7.42)
NEUTROPHILS NFR BLD: 63.6 % (ref 44–72)
NRBC # BLD AUTO: 0 K/UL
NRBC BLD-RTO: 0 /100 WBC
PLATELET # BLD AUTO: 52 K/UL (ref 164–446)
PMV BLD AUTO: 10.6 FL (ref 9–12.9)
POTASSIUM SERPL-SCNC: 4.1 MMOL/L (ref 3.6–5.5)
POTASSIUM UR-SCNC: 47 MMOL/L
RBC # BLD AUTO: 3.47 M/UL (ref 4.7–6.1)
SIGNIFICANT IND 70042: NORMAL
SITE SITE: NORMAL
SODIUM SERPL-SCNC: 131 MMOL/L (ref 135–145)
SODIUM UR-SCNC: <20 MMOL/L
SOURCE SOURCE: NORMAL
VANCOMYCIN TROUGH SERPL-MCNC: 23 UG/ML (ref 10–20)
WBC # BLD AUTO: 3.8 K/UL (ref 4.8–10.8)

## 2020-10-23 PROCEDURE — 80202 ASSAY OF VANCOMYCIN: CPT

## 2020-10-23 PROCEDURE — 700102 HCHG RX REV CODE 250 W/ 637 OVERRIDE(OP): Performed by: INTERNAL MEDICINE

## 2020-10-23 PROCEDURE — A9270 NON-COVERED ITEM OR SERVICE: HCPCS | Performed by: STUDENT IN AN ORGANIZED HEALTH CARE EDUCATION/TRAINING PROGRAM

## 2020-10-23 PROCEDURE — 700101 HCHG RX REV CODE 250: Performed by: INTERNAL MEDICINE

## 2020-10-23 PROCEDURE — 82436 ASSAY OF URINE CHLORIDE: CPT

## 2020-10-23 PROCEDURE — 76775 US EXAM ABDO BACK WALL LIM: CPT

## 2020-10-23 PROCEDURE — 85025 COMPLETE CBC W/AUTO DIFF WBC: CPT

## 2020-10-23 PROCEDURE — 99233 SBSQ HOSP IP/OBS HIGH 50: CPT | Performed by: INTERNAL MEDICINE

## 2020-10-23 PROCEDURE — 99255 IP/OBS CONSLTJ NEW/EST HI 80: CPT | Performed by: INTERNAL MEDICINE

## 2020-10-23 PROCEDURE — A9270 NON-COVERED ITEM OR SERVICE: HCPCS | Performed by: INTERNAL MEDICINE

## 2020-10-23 PROCEDURE — 84133 ASSAY OF URINE POTASSIUM: CPT

## 2020-10-23 PROCEDURE — 83735 ASSAY OF MAGNESIUM: CPT

## 2020-10-23 PROCEDURE — 84300 ASSAY OF URINE SODIUM: CPT

## 2020-10-23 PROCEDURE — 36415 COLL VENOUS BLD VENIPUNCTURE: CPT

## 2020-10-23 PROCEDURE — 770006 HCHG ROOM/CARE - MED/SURG/GYN SEMI*

## 2020-10-23 PROCEDURE — 700102 HCHG RX REV CODE 250 W/ 637 OVERRIDE(OP): Performed by: STUDENT IN AN ORGANIZED HEALTH CARE EDUCATION/TRAINING PROGRAM

## 2020-10-23 PROCEDURE — 700105 HCHG RX REV CODE 258: Performed by: INTERNAL MEDICINE

## 2020-10-23 PROCEDURE — 51798 US URINE CAPACITY MEASURE: CPT

## 2020-10-23 PROCEDURE — 82570 ASSAY OF URINE CREATININE: CPT

## 2020-10-23 PROCEDURE — 80048 BASIC METABOLIC PNL TOTAL CA: CPT

## 2020-10-23 RX ORDER — SODIUM BICARBONATE IN D5W 150/1000ML
PLASTIC BAG, INJECTION (ML) INTRAVENOUS CONTINUOUS
Status: DISCONTINUED | OUTPATIENT
Start: 2020-10-23 | End: 2020-10-24

## 2020-10-23 RX ORDER — LACTULOSE 10 G/15ML
20 SOLUTION ORAL 3 TIMES DAILY
COMMUNITY
End: 2023-04-26

## 2020-10-23 RX ORDER — LINEZOLID 600 MG/1
600 TABLET, FILM COATED ORAL EVERY 12 HOURS
Status: COMPLETED | OUTPATIENT
Start: 2020-10-23 | End: 2020-10-26

## 2020-10-23 RX ORDER — SPIRONOLACTONE 100 MG/1
100 TABLET, FILM COATED ORAL DAILY
Status: ON HOLD | COMMUNITY
End: 2020-10-29 | Stop reason: SDUPTHER

## 2020-10-23 RX ORDER — FUROSEMIDE 20 MG/1
60 TABLET ORAL DAILY
Status: ON HOLD | COMMUNITY
End: 2020-10-29 | Stop reason: SDUPTHER

## 2020-10-23 RX ADMIN — Medication 400 MG: at 05:32

## 2020-10-23 RX ADMIN — FOLIC ACID 1 MG: 1 TABLET ORAL at 05:32

## 2020-10-23 RX ADMIN — PROPRANOLOL HYDROCHLORIDE 10 MG: 10 TABLET ORAL at 22:48

## 2020-10-23 RX ADMIN — SODIUM CHLORIDE, POTASSIUM CHLORIDE, SODIUM LACTATE AND CALCIUM CHLORIDE: 600; 310; 30; 20 INJECTION, SOLUTION INTRAVENOUS at 02:15

## 2020-10-23 RX ADMIN — SODIUM BICARBONATE 100 ML/HR: 84 INJECTION, SOLUTION INTRAVENOUS at 22:45

## 2020-10-23 RX ADMIN — SODIUM BICARBONATE: 84 INJECTION, SOLUTION INTRAVENOUS at 10:17

## 2020-10-23 RX ADMIN — Medication 100 MG: at 05:32

## 2020-10-23 RX ADMIN — OMEPRAZOLE 40 MG: 20 CAPSULE, DELAYED RELEASE ORAL at 05:32

## 2020-10-23 RX ADMIN — OMEPRAZOLE 40 MG: 20 CAPSULE, DELAYED RELEASE ORAL at 17:13

## 2020-10-23 RX ADMIN — THERA TABS 1 TABLET: TAB at 05:32

## 2020-10-23 RX ADMIN — Medication 400 MG: at 17:13

## 2020-10-23 RX ADMIN — LINEZOLID 600 MG: 600 TABLET, FILM COATED ORAL at 17:13

## 2020-10-23 ASSESSMENT — ENCOUNTER SYMPTOMS
CHILLS: 0
SHORTNESS OF BREATH: 0
ABDOMINAL PAIN: 0
VOMITING: 0
LOSS OF CONSCIOUSNESS: 0
BLOOD IN STOOL: 0
FEVER: 0
SEIZURES: 0
NERVOUS/ANXIOUS: 0
BLURRED VISION: 0
NAUSEA: 0
FALLS: 0

## 2020-10-23 ASSESSMENT — PAIN DESCRIPTION - PAIN TYPE: TYPE: ACUTE PAIN

## 2020-10-23 NOTE — PROGRESS NOTES
Received report from Day shift RN. Assumed care of patient at 1900. Patient A&Ox4 at this this time. On no O2, no signs of respiratory distress. No complaints of pain or discomfort. Call light and belongings within reach. Bed in lowest locked position. Upper side rails raised. Hourly rounding in place. Will continue to monitor.

## 2020-10-23 NOTE — PROGRESS NOTES
Notified Dr. Ramirez of BS scan results and that ascites likely altered results. No new orders, will continue to monitor urine output.

## 2020-10-23 NOTE — CONSULTS
DATE OF SERVICE:  10/23/2020    NEPHROLOGY CONSULTATION    CONSULT AT THE REQUEST OF:  Prabha Ramirez MD    REASON FOR THE CONSULT:  Evaluate patient with acute kidney injury.    HISTORY OF PRESENT ILLNESS:  The patient is a 45-year-old male with baseline   creatinine level of 0.7, history of liver cirrhosis with ascites, who   presented to the emergency room on 10/19/2020 with complaints of worsening   abdominal pain, worsening ascites, treated with diuresis and paracentesis.    Blood cultures grew Staph haemolyticus and infectious disease team recommended   vancomycin.  Over hospital course, noticed worsening creatinine level from   his baseline up to 3.0, 3.8.  Vancomycin level was significantly elevated up   to 46.5.  Currently, patient is complaining of urinary symptoms with hesitancy   to urinate, slow stream.  Urinalysis upon admission revealed moderate blood,   positive nitrite, white blood count 0-2, red blood count 5-10.  Renal   ultrasound completed today, results are pending.    REVIEW OF SYSTEMS:  GENERAL:  Positive for fatigue.  No fever or chills.  No sick contacts.  HEENT:  No nosebleeds, no sore throat, no sinus pain.  EYES:  No double or blurry vision, no eye pain.  NECK:  No pain, no stiffness.  RESPIRATORY:  No shortness of breath, no cough, no hemoptysis.  CARDIOVASCULAR:  No dyspnea, no edema, no palpitations, no chest pain.  GASTROINTESTINAL:  No abdominal pain, no nausea or vomiting.  Positive   abdominal distention.  GENITOURINARY:  Positive for hesitancy, slow urinary stream.  No dysuria, no   hematuria, no flank pain.  All other systems reviewed, all negative.    PAST MEDICAL HISTORY:  Positive for anemia, cirrhosis, alcohol abuse,   gastrointestinal bleeding, pancreatitis.    PAST SURGICAL HISTORY:  Hernia repair, gastroscopy.    FAMILY HISTORY:  Positive for diabetes mellitus type 2.    SOCIAL HISTORY:  No tobacco.  Positive for alcohol use.  No drugs.    ALLERGIES:  No known drug  allergies.    OUTPATIENT AND INPATIENT MEDICATIONS:  Reviewed.    PHYSICAL EXAMINATION:  VITAL SIGNS:  Blood pressure 92/49, pulse 82, temperature 36.7 Celsius.  GENERAL APPEARANCE:  Well-developed, well-nourished male in no acute distress.  HEENT:  Normocephalic, atraumatic.  Pupils equal, round, reactive to light.    Conjunctivae clear.  Positive for icteric sclerae.  Nares patent.  Oropharynx   clear, moist mucosa, no erythema or exudate.  NECK:  Supple.  No lymphadenopathy, no thyromegaly appreciated.  LUNGS:  Clear to auscultation bilaterally.  No rales or wheezes, no rhonchi.  HEART:  Regular rate.  No rub or gallop.  ABDOMEN:  Soft, distended.  Bowel sounds present.  No palpable masses.  No   rebound tenderness.  No guarding.  EXTREMITIES:  No cyanosis, no clubbing, no edema.  SKIN:  Positive for jaundice.  No erythema or rash.  NEUROLOGIC EXAM:  Alert and oriented x3, no focal deficit.    LABORATORY RESULTS:  Reviewed, revealed hemoglobin 9.3.  Sodium 131, potassium   4.1, BUN 32 and creatinine level of 3.8, CO2 of 18.    Urine chemistry and renal ultrasound pending.    ASSESSMENT AND PLAN:  Patient is a 45-year-old male with history of liver   cirrhosis, alcohol abuse; admitted with abdominal pain, worsening ascites with   hospital course complicated by acute kidney injury.  1.  Acute kidney injury, multifactorial, likely from large paracentesis,   diuretics and vancomycin toxicity.  2.  Electrolytes.  Noticed mild hyponatremia, to monitor.  3.  Metabolic acidosis.  To switch IV fluids to bicarbonate drip.  4.  Hypotension.  Continue IV fluids.  5.  Anemia.  Hemoglobin level is stable, to monitor.    RECOMMENDATIONS:  1.  Continue IV fluids.  Keep mean arterial pressure above 65.  Avoid   nephrotoxic agents, avoid vancomycin at present time.  Daily basic metabolic   panel.  2.  No need for emergent dialysis.  We will follow up patient closely.    Thank you for the consult.  Above plan was discussed with   Ashley.       ____________________________________     MD TUAN ALLRED    DD:  10/23/2020 14:44:01  DT:  10/23/2020 16:31:14    D#:  [id number]  Job#:  [id number]

## 2020-10-23 NOTE — PROGRESS NOTES
Hospital Medicine Daily Progress Note    Date of Service  10/23/2020    Chief Complaint  45 y.o. male admitted 10/19/2020 with abdominal pain    Hospital Course    45-year-old male with a history of alcoholic cirrhosis complicated by esophageal varices status post banding (9/2019) and ascites who presented on 10/19/2020 with severe acute abdominal pain reported in right lower quadrant.  He denied any fever/chills or GI bleeding.  Patient denies regular alcohol use but does report 2-3 shots of alcohol on day of admission.  CT abdomen showed possible enteritis and massive ascites as well as gastric/esophageal varices.  Patient underwent paracentesis on 10/19/2020 with improvement in abdominal pain.  They removed 4 L.  His initial blood cultures grew 2/2 staph haemolyticus.  TTE normal.  Ascitic fluid cx NGTD.  ID consulted He was treated with vancomycin-->daptomycin then linezolid to complete course. His hospital course was c/b AROLDO due to vancomycin with high vanc trough.        Interval Problem Update  - Cr worsening, still urinating but UOP low (370mL in last 24 hours), consulting nephrology, no indication for HD, starting bicarb gtt  - starting linezolid tonight, ID signed off  - Afebrile  - On RA  - BP slightly soft SBP 90s    Consultants/Specialty  ID (signed off)  Nephrology    Code Status  Full Code    Disposition  TBD, likely home when medically clear    Review of Systems  Review of Systems   Constitutional: Negative for chills and fever.   HENT: Negative for hearing loss and nosebleeds.    Eyes: Negative for blurred vision.   Respiratory: Negative for shortness of breath.    Cardiovascular: Negative for chest pain and leg swelling.   Gastrointestinal: Negative for abdominal pain, blood in stool, melena, nausea and vomiting.   Genitourinary: Negative for hematuria.   Musculoskeletal: Negative for falls.   Skin: Negative for rash.   Neurological: Negative for seizures and loss of consciousness.    Psychiatric/Behavioral: The patient is not nervous/anxious.         Physical Exam  Temp:  [36.5 °C (97.7 °F)-37.1 °C (98.7 °F)] 36.7 °C (98.1 °F)  Pulse:  [80-89] 82  Resp:  [16-19] 18  BP: ()/(47-69) 92/49  SpO2:  [99 %-100 %] 99 %    Physical Exam  Vitals signs and nursing note reviewed.   Constitutional:       General: He is not in acute distress.     Appearance: He is not toxic-appearing or diaphoretic.   HENT:      Head: Normocephalic and atraumatic.      Nose: Nose normal.      Mouth/Throat:      Mouth: Mucous membranes are moist.   Eyes:      General: No scleral icterus.        Right eye: No discharge.         Left eye: No discharge.      Conjunctiva/sclera: Conjunctivae normal.   Neck:      Musculoskeletal: Normal range of motion.   Cardiovascular:      Rate and Rhythm: Normal rate and regular rhythm.      Heart sounds: Normal heart sounds.   Pulmonary:      Effort: Pulmonary effort is normal.      Breath sounds: Normal breath sounds.   Abdominal:      General: Bowel sounds are normal. There is distension (mild).      Palpations: Abdomen is soft.      Tenderness: There is no abdominal tenderness.   Musculoskeletal:      Right lower leg: No edema.      Left lower leg: No edema.   Skin:     General: Skin is warm and dry.   Neurological:      Mental Status: He is alert and oriented to person, place, and time.      Motor: No tremor.      Gait: Gait normal.   Psychiatric:         Mood and Affect: Mood normal.         Behavior: Behavior normal.         Fluids    Intake/Output Summary (Last 24 hours) at 10/23/2020 1312  Last data filed at 10/23/2020 1100  Gross per 24 hour   Intake 240 ml   Output 470 ml   Net -230 ml       Laboratory  Recent Labs     10/21/20  0034 10/22/20  0047 10/23/20  0406   WBC 3.5* 4.2* 3.8*   RBC 3.99* 3.47* 3.47*   HEMOGLOBIN 10.4* 9.1* 9.3*   HEMATOCRIT 31.9* 28.2* 28.5*   MCV 79.9* 81.3* 82.1   MCH 26.1* 26.2* 26.8*   MCHC 32.6* 32.3* 32.6*   RDW 50.1* 52.7* 54.3*   PLATELETCT  "44* 44* 52*   MPV 9.9  --  10.6     Recent Labs     10/21/20  1811 10/22/20  0047 10/23/20  0406   SODIUM 131* 131* 131*   POTASSIUM 4.2 4.0 4.1   CHLORIDE 98 100 100   CO2 20 19* 18*   GLUCOSE 129* 95 117*   BUN 19 23* 32*   CREATININE 2.75* 3.06* 3.85*   CALCIUM 8.2* 7.5* 7.8*              Results     Procedure Component Value Units Date/Time    Fluid Culture with Gram Stain [[id number]] Collected: 10/19/20 1140    Order Status: Completed Specimen: Body Fluid from Peritoneal Fluid Updated: 10/22/20 0811     Significant Indicator NEG     Source BF     Site Ascites Fluid     Culture Result No growth at 72 hours.     Gram Stain Result Rare WBCs.  No organisms seen.      Narrative:      Peritoneal Fluid    URINE CULTURE-EXISTING-LESS THAN 48 HOURS [[id number]] Collected: 10/20/20 0923    Order Status: Completed Specimen: Urine, Clean Catch Updated: 10/21/20 1743    Narrative:      Indication for culture:->Evaluation for sepsis without a  clear source of infection    BLOOD CULTURE [[id number]]  (Abnormal) Collected: 10/19/20 0145    Order Status: Completed Specimen: Blood from Peripheral Updated: 10/21/20 1241     Significant Indicator POS     Source BLD     Site PERIPHERAL     Culture Result Growth detected by Bactec instrument. 10/20/2020  06:44      Staphylococcus haemolyticus  See previous culture for sensitivity report.      Narrative:      CALL  Denise  MS5 tel. [phone],  CALLED  MS5 tel. [phone] 10/20/2020, 06:49, RB PERF. RESULTS CALLED  TO:[phone] RN  Per Hospital Policy: Only change Specimen Src: to \"Line\" if  specified by physician order.  Left AC    BLOOD CULTURE [[id number]]  (Abnormal)  (Susceptibility) Collected: 10/19/20 0300    Order Status: Completed Specimen: Blood from Peripheral Updated: 10/21/20 1240     Significant Indicator POS     Source BLD     Site PERIPHERAL     Culture Result Growth detected by Bactec instrument. 10/19/2020  21:47   CORRECTED REPORT  Correct result:  Negative for " "Staphylococcus aureus and MRSA by PCR. Correlate  ongoing need for antibiotics with clinical condition.  Erroneous result:Staphylococcus aureus (methicillin  sensitive)  detected by PCR.        Staphylococcus haemolyticus  This isolate is presumed to be clindamycin resistant based on  detection of inducible resistance.  Clindamycin may still  be effective in some patients.      Narrative:      CALL  Denise  MS5 tel. [phone],  CALLED  MS5 tel. [phone] 10/20/2020, 07:44, RB PERF. RESULTS CALLED  TO:[phone] and Cindy pharm  Per Hospital Policy: Only change Specimen Src: to \"Line\" if  specified by physician order.  No site indicated    Susceptibility     Staphylococcus haemolyticus (1)     Antibiotic Interpretation Microscan Method Status    Azithromycin Resistant >4 mcg/mL SAUL Final    Clindamycin Resistant <=0.5 mcg/mL SAUL Final    Cefazolin Resistant <=8 mcg/mL SAUL Final    Daptomycin Sensitive <=1 mcg/mL SAUL Final    Ampicillin/sulbactam Resistant <=8/4 mcg/mL SAUL Final    Erythromycin Resistant >4 mcg/mL SAUL Final    Vancomycin Sensitive 1 mcg/mL SAUL Final    Oxacillin Resistant >2 mcg/mL SAUL Final    Penicillin Resistant 8 mcg/mL SAUL Final    Trimeth/Sulfa Sensitive <=0.5/9.5 mcg/mL SAUL Final    Tetracycline Resistant >8 mcg/mL SAUL Final                   BLOOD CULTURE [[id number]] Collected: 10/20/20 0854    Order Status: Completed Specimen: Blood from Peripheral Updated: 10/21/20 0712     Significant Indicator NEG     Source BLD     Site PERIPHERAL     Culture Result No Growth  Note: Blood cultures are incubated for 5 days and  are monitored continuously.Positive blood cultures  are called to the RN and reported as soon as  they are identified.      Narrative:      Per Hospital Policy: Only change Specimen Src: to \"Line\" if  specified by physician order.  No site indicated    BLOOD CULTURE [[id number]] Collected: 10/20/20 1215    Order Status: Completed Specimen: Blood from Peripheral Updated: 10/21/20 0712     " "Significant Indicator NEG     Source BLD     Site PERIPHERAL     Culture Result No Growth  Note: Blood cultures are incubated for 5 days and  are monitored continuously.Positive blood cultures  are called to the RN and reported as soon as  they are identified.      Narrative:      Per Hospital Policy: Only change Specimen Src: to \"Line\" if  specified by physician order.  Right AC    MRSA By PCR (Amp) [[id number]] Collected: 10/20/20 0001    Order Status: Completed Specimen: Respirate from Nares Updated: 10/20/20 1550     Significant Indicator NEG     Source RESP     Site NARES     MRSA PCR Negative for MRSA by PCR.    Narrative:      Collected By:[id number] YUDY BAKER  Collected By:[id number] [facility] SAMUEL    URINALYSIS CULTURE, IF INDICATED [[id number]]  (Abnormal) Collected: 10/20/20 0923    Order Status: Completed Specimen: Urine, Clean Catch Updated: 10/20/20 1037     Color Karla     Character Clear     Specific Gravity 1.025     Ph 5.5     Glucose Negative mg/dL      Ketones Trace mg/dL      Protein Negative mg/dL      Bilirubin Moderate     Urobilinogen, Urine 0.2     Nitrite Positive     Leukocyte Esterase Negative     Occult Blood Moderate     Micro Urine Req Microscopic    COVID/SARS CoV-2 PCR [[id number]] Collected: 10/20/20 0001    Order Status: Completed Specimen: Respirate from Nasopharyngeal Updated: 10/20/20 0011     COVID Order Status Received     Comment: The order for SARS CoV-2 testing has been received by the  Laboratory. This result is neither positive nor negative.  Final results of testing will report in 24-48 hours, separately.         Narrative:      Is patient being admitted?->Yes  Does this patient meet criteria for Rush/Cepheid per [facility]  Inpatient Workflow? (See workflow link below)->No  Expected turn around time?->Routine (In-House PCR up to 24  hours)  Is this the patients First SARS CoV-2 test?->Unknown  Is this patient employed in healthcare?->No  Is the patient symptomatic as defined by the " CDC?->No  Is the patient hospitalized?->Yes  Is the patient in the ICU?->No  Is the patient a resident in a congregate care setting?->No  Is the patient pregnant?->No    GRAM STAIN [[id number]] Collected: 10/19/20 1140    Order Status: Completed Specimen: Body Fluid Updated: 10/19/20 1658     Significant Indicator .     Source BF     Site Ascites Fluid     Gram Stain Result Rare WBCs.  No organisms seen.      Narrative:      Peritoneal Fluid    SARS-CoV-2, PCR (In-House) [[id number]] Collected: 10/19/20 0145    Order Status: Completed Updated: 10/19/20 1259     SARS-CoV-2 Source NP Swab     SARS-CoV-2 by PCR NotDetected     Comment: [facility] providers: PLEASE REFER TO DE-ESCALATION AND RETESTING PROTOCOL  on [email or web address]  **The TaqPath COVID-19 SARS-CoV-2 test has been made available for use under  the Emergency Use Authorization (EUA) only.         Narrative:      Is patient being admitted?->Yes  Does this patient meet criteria for Rush/Cepheid per [facility]  Inpatient Workflow? (See workflow link below)->No  Expected turn around time?->Routine (In-House PCR up to 24  hours)  Is this the patients First SARS CoV-2 test?->Unknown  Is this patient employed in healthcare?->No  Is the patient symptomatic as defined by the CDC?->No  Is the patient hospitalized?->Yes  Is the patient in the ICU?->No  Is the patient a resident in a congregate care setting?->No  Is the patient pregnant?->No    COVID/SARS CoV-2 PCR [[id number]] Collected: 10/19/20 0145    Order Status: Completed Specimen: Respirate from Nasopharyngeal Updated: 10/19/20 0156     COVID Order Status Received     Comment: The order for SARS CoV-2 testing has been received by the  Laboratory. This result is neither positive nor negative.  Final results of testing will report in 24-48 hours, separately.         Narrative:      Is patient being admitted?->Yes  Does this patient meet criteria for Rush/Cepheid per [facility]  Inpatient Workflow? (See workflow link  below)->No  Expected turn around time?->Routine (In-House PCR up to 24  hours)  Is this the patients First SARS CoV-2 test?->Unknown  Is this patient employed in healthcare?->No  Is the patient symptomatic as defined by the CDC?->No  Is the patient hospitalized?->Yes  Is the patient in the ICU?->No  Is the patient a resident in a congregate care setting?->No  Is the patient pregnant?->No    BLOOD CULTURE (Child) [[id number]] Collected: 10/19/20 0127    Order Status: Canceled Specimen: Other from Peripheral                 Imaging  EC-ECHOCARDIOGRAM COMPLETE W/O CONT   Final Result      US-PARACENTESIS, ABD WITH IMAGING   Final Result      1. Ultrasound-guided diagnostic paracentesis of the right lower quadrant of the abdominal wall.      2. 1000 mL of fluid withdrawn.      CT-ABDOMEN-PELVIS WITH   Final Result         1.  Small bowel mucosal thickening, appearance suggests enteritis.   2.  Changes of cirrhosis with massive abdominal ascites   3.  Esophageal and gastric varices   4.  Left inguinal hernia containing ascites   5.  Umbilical hernia containing ascites   6.  Splenomegaly      DX-CHEST-PORTABLE (1 VIEW)   Final Result         1.  No acute cardiopulmonary disease.   2.  Perihilar interstitial prominence and bronchial wall cuffing, appearance suggests changes of underlying bronchial inflammation, consider bronchitis.           Assessment/Plan  * Ascites- (present on admission)  Assessment & Plan  Pt with worsening RLQ abdominal pain and distention for the past few months  Currently denies any fevers, chills, N/V, diarrhea, or constipation  CT abd w/ possible enteritis and massive ascites as well as gastric/esophageal varices  - therapeutic/diagnostic paracentesis  - ascitic fluid , cx NG  - analgesics  - monitor BP  - c/w home Propranolol   - Alcohol cessation counseling  - Takes lasix outpatient, restarted but then Cr increased so now on hold  - needs GI outpatient follow up    AROLDO (acute kidney  injury) (HCC)  Assessment & Plan  Likely in the setting of vanc (vanc trough > 40) +/- lasix, paracentesis (removed 4L) and liver disease  CT abdomen/pelvis with normal kidneys and ureters  Still urinating but output decreased  UA with positive nitrite, moderate occult blood, negative protein, ucx pending, denies dysuria, ucx neg  No improvement with IVF  Nephrology following: starting bicarb gtt      Bacteremia  Assessment & Plan  2/2 blood cx growing CoN staph: staph haemolyticus  Source? No hardware, denies back pain or joint pain, abdominal pain on admission s/p para, ascitic cx NGTD  vanc-->ancef -->vanc-->daptomycin-->linezolid 10/23-10/26  Repeat bcx NGTD  TTE normal    Pancytopenia (HCC)  Assessment & Plan  Likely in setting of cirrhosis, no neutropenia  H/H and plt dropped after procedure, but vital signs normal and no obvious bleeding  CTM    Enteritis- (present on admission)  Assessment & Plan  CT abd w/ Small bowel mucosal thickening, appearance suggests enteritis.  Pt with pinching abd pain but otherwise denies further Sx at this time  Abdominal pain resolved with paracentesis       Alcohol abuse- (present on admission)  Assessment & Plan  Pt with alcohol cirrhosis  Denies routine drinking, but admits he drank 2-3 shots on day of admission  Alcohol level 366  - s/p CIWA monitoring  - Multivitamin and RALLY Bag  - Thiamine 1mg qd x4 doses and Folic Acid 1mg qd x4 doses  - Seizure/Fall precautions  - Alcohol cessation counseling      VTE: SCDs       VTE prophylaxis: SCDs (d/t thrombocytopenia), ambulatory

## 2020-10-23 NOTE — CARE PLAN
Problem: Communication  Goal: The ability to communicate needs accurately and effectively will improve  Outcome: PROGRESSING AS EXPECTED     Problem: Safety  Goal: Will remain free from injury  Outcome: PROGRESSING AS EXPECTED  Goal: Will remain free from falls  Outcome: PROGRESSING AS EXPECTED     Problem: Venous Thromboembolism (VTW)/Deep Vein Thrombosis (DVT) Prevention:  Goal: Patient will participate in Venous Thrombosis (VTE)/Deep Vein Thrombosis (DVT)Prevention Measures  Outcome: PROGRESSING AS EXPECTED     Problem: Infection  Goal: Will remain free from infection  Outcome: PROGRESSING AS EXPECTED

## 2020-10-24 PROBLEM — E87.1 HYPONATREMIA: Status: ACTIVE | Noted: 2020-10-24

## 2020-10-24 LAB
ALBUMIN SERPL BCP-MCNC: 2.3 G/DL (ref 3.2–4.9)
ALBUMIN/GLOB SERPL: 0.6 G/DL
ALP SERPL-CCNC: 95 U/L (ref 30–99)
ALT SERPL-CCNC: 13 U/L (ref 2–50)
ANION GAP SERPL CALC-SCNC: 10 MMOL/L (ref 7–16)
AST SERPL-CCNC: 38 U/L (ref 12–45)
BILIRUB SERPL-MCNC: 1.4 MG/DL (ref 0.1–1.5)
BUN SERPL-MCNC: 34 MG/DL (ref 8–22)
CALCIUM SERPL-MCNC: 7.9 MG/DL (ref 8.5–10.5)
CHLORIDE SERPL-SCNC: 96 MMOL/L (ref 96–112)
CO2 SERPL-SCNC: 23 MMOL/L (ref 20–33)
CREAT SERPL-MCNC: 3.77 MG/DL (ref 0.5–1.4)
ERYTHROCYTE [DISTWIDTH] IN BLOOD BY AUTOMATED COUNT: 55.3 FL (ref 35.9–50)
GLOBULIN SER CALC-MCNC: 3.8 G/DL (ref 1.9–3.5)
GLUCOSE SERPL-MCNC: 117 MG/DL (ref 65–99)
HCT VFR BLD AUTO: 28.1 % (ref 42–52)
HGB BLD-MCNC: 9.2 G/DL (ref 14–18)
MCH RBC QN AUTO: 26.7 PG (ref 27–33)
MCHC RBC AUTO-ENTMCNC: 32.7 G/DL (ref 33.7–35.3)
MCV RBC AUTO: 81.4 FL (ref 81.4–97.8)
PLATELET # BLD AUTO: 51 K/UL (ref 164–446)
PMV BLD AUTO: 10.9 FL (ref 9–12.9)
POTASSIUM SERPL-SCNC: 3.7 MMOL/L (ref 3.6–5.5)
PROT SERPL-MCNC: 6.1 G/DL (ref 6–8.2)
RBC # BLD AUTO: 3.45 M/UL (ref 4.7–6.1)
SODIUM SERPL-SCNC: 129 MMOL/L (ref 135–145)
WBC # BLD AUTO: 3.5 K/UL (ref 4.8–10.8)

## 2020-10-24 PROCEDURE — 700101 HCHG RX REV CODE 250: Performed by: INTERNAL MEDICINE

## 2020-10-24 PROCEDURE — 99233 SBSQ HOSP IP/OBS HIGH 50: CPT | Performed by: INTERNAL MEDICINE

## 2020-10-24 PROCEDURE — 36415 COLL VENOUS BLD VENIPUNCTURE: CPT

## 2020-10-24 PROCEDURE — A9270 NON-COVERED ITEM OR SERVICE: HCPCS | Performed by: INTERNAL MEDICINE

## 2020-10-24 PROCEDURE — 85027 COMPLETE CBC AUTOMATED: CPT

## 2020-10-24 PROCEDURE — 770006 HCHG ROOM/CARE - MED/SURG/GYN SEMI*

## 2020-10-24 PROCEDURE — 80053 COMPREHEN METABOLIC PANEL: CPT

## 2020-10-24 PROCEDURE — 700102 HCHG RX REV CODE 250 W/ 637 OVERRIDE(OP): Performed by: INTERNAL MEDICINE

## 2020-10-24 PROCEDURE — 700102 HCHG RX REV CODE 250 W/ 637 OVERRIDE(OP): Performed by: STUDENT IN AN ORGANIZED HEALTH CARE EDUCATION/TRAINING PROGRAM

## 2020-10-24 PROCEDURE — A9270 NON-COVERED ITEM OR SERVICE: HCPCS | Performed by: STUDENT IN AN ORGANIZED HEALTH CARE EDUCATION/TRAINING PROGRAM

## 2020-10-24 RX ADMIN — DOCUSATE SODIUM 50 MG AND SENNOSIDES 8.6 MG 2 TABLET: 8.6; 5 TABLET, FILM COATED ORAL at 04:49

## 2020-10-24 RX ADMIN — OMEPRAZOLE 40 MG: 20 CAPSULE, DELAYED RELEASE ORAL at 04:49

## 2020-10-24 RX ADMIN — Medication 400 MG: at 17:52

## 2020-10-24 RX ADMIN — OMEPRAZOLE 40 MG: 20 CAPSULE, DELAYED RELEASE ORAL at 17:52

## 2020-10-24 RX ADMIN — PROPRANOLOL HYDROCHLORIDE 10 MG: 10 TABLET ORAL at 14:27

## 2020-10-24 RX ADMIN — Medication 400 MG: at 04:49

## 2020-10-24 RX ADMIN — LINEZOLID 600 MG: 600 TABLET, FILM COATED ORAL at 17:52

## 2020-10-24 RX ADMIN — PROPRANOLOL HYDROCHLORIDE 10 MG: 10 TABLET ORAL at 21:40

## 2020-10-24 RX ADMIN — LINEZOLID 600 MG: 600 TABLET, FILM COATED ORAL at 04:49

## 2020-10-24 RX ADMIN — PROPRANOLOL HYDROCHLORIDE 10 MG: 10 TABLET ORAL at 04:49

## 2020-10-24 ASSESSMENT — ENCOUNTER SYMPTOMS
FALLS: 0
FLANK PAIN: 0
NAUSEA: 0
VOMITING: 0
FEVER: 0
CHILLS: 0
PALPITATIONS: 0
LOSS OF CONSCIOUSNESS: 0
HEMOPTYSIS: 0
WHEEZING: 0
COUGH: 0
ORTHOPNEA: 0
SHORTNESS OF BREATH: 0
SEIZURES: 0
ABDOMINAL PAIN: 0
NERVOUS/ANXIOUS: 0
BLOOD IN STOOL: 0
EYES NEGATIVE: 1
BLURRED VISION: 0
SINUS PAIN: 0

## 2020-10-24 ASSESSMENT — PAIN DESCRIPTION - PAIN TYPE: TYPE: ACUTE PAIN

## 2020-10-24 ASSESSMENT — FIBROSIS 4 INDEX: FIB4 SCORE: 10.58

## 2020-10-24 NOTE — PROGRESS NOTES
"Patient asked about \"medication he was taking at home to help him pee\"  Current [facility] pharmacy did not have any record of any other medications other than prilosec and inderal. Pt stated he uses another pharmacy as well, [facility] in [facility].   "

## 2020-10-24 NOTE — PROGRESS NOTES
Assumed care at 0700 after report received from night RN. Pt A/Ox4, on RA, LFA PIV site WNL. No complaints of abd pain, abd distended and firm. Pt on strict I/O's for renal function. Possible discharge home pending kidney function panel in AM.

## 2020-10-24 NOTE — CARE PLAN
Problem: Safety  Goal: Will remain free from falls  Outcome: PROGRESSING AS EXPECTED  Intervention: Assess risk factors for falls  Flowsheets (Taken 10/24/2020 1005)  Fall Risk: Risk to Fall -  0 - 1 point  History of fall: 0  Mobility Status Assessment: 0-Ambulates & Transfers Independently. No Assistance Required  Risk for Injury-Any positive answers results in the pt being at high risk for fall related injury: Not Applicable  Note: All fall precautions in place base don pt risk     Problem: Discharge Barriers/Planning  Goal: Patient's continuum of care needs will be met  Outcome: PROGRESSING AS EXPECTED  Intervention: Assess potential discharge barriers on admission and throughout hospital stay  Flowsheets (Taken 10/24/2020 1005)  Does Admitting Nurse Feel This Could be a Complex Discharge?: No  Note: Possible d/c home tomorrow pending kidney function labs

## 2020-10-24 NOTE — PROGRESS NOTES
Nephrology Daily Progress Note    Date of Service  10/24/2020    Chief Complaint  45 y.o. male with liver cirrhosis,admitted 10/19/2020 with abdominal pain worsening ascites, staph bacteremia with hospital course complicated with AROLDO    Interval Problem Update  10/24 -doing well, no complaints  No abdominal pain, no N/V  Acidosis corrected  Creat level improving    Review of Systems  Review of Systems   Constitutional: Negative for chills, fever and malaise/fatigue.   HENT: Negative for congestion, hearing loss and sinus pain.    Eyes: Negative.    Respiratory: Negative for cough, hemoptysis, shortness of breath and wheezing.    Cardiovascular: Negative for chest pain, palpitations, orthopnea and leg swelling.   Gastrointestinal: Negative for abdominal pain, nausea and vomiting.   Genitourinary: Negative for dysuria, flank pain, frequency, hematuria and urgency.   Skin: Negative.    All other systems reviewed and are negative.       Physical Exam  Temp:  [36.5 °C (97.7 °F)-36.7 °C (98 °F)] 36.7 °C (98 °F)  Pulse:  [66-89] 77  Resp:  [16] 16  BP: ()/(55-70) 102/67  SpO2:  [100 %] 100 %    Physical Exam  Vitals signs and nursing note reviewed.   Constitutional:       General: He is not in acute distress.     Appearance: Normal appearance. He is well-developed. He is not diaphoretic.   HENT:      Head: Normocephalic and atraumatic.      Nose: Nose normal.      Mouth/Throat:      Mouth: Mucous membranes are moist.      Pharynx: Oropharynx is clear.   Eyes:      Extraocular Movements: Extraocular movements intact.      Conjunctiva/sclera: Conjunctivae normal.      Pupils: Pupils are equal, round, and reactive to light.   Neck:      Musculoskeletal: Normal range of motion and neck supple.   Cardiovascular:      Rate and Rhythm: Normal rate and regular rhythm.      Pulses: Normal pulses.      Heart sounds: Normal heart sounds.   Pulmonary:      Effort: Pulmonary effort is normal. No respiratory distress.      Breath  sounds: Normal breath sounds. No wheezing, rhonchi or rales.   Abdominal:      General: Bowel sounds are normal. There is distension.      Palpations: Abdomen is soft. There is no mass.      Tenderness: There is no abdominal tenderness. There is no right CVA tenderness or left CVA tenderness.   Musculoskeletal:      Right lower leg: No edema.      Left lower leg: No edema.   Skin:     General: Skin is warm.      Findings: No erythema or rash.   Neurological:      General: No focal deficit present.      Mental Status: He is alert and oriented to person, place, and time.      Cranial Nerves: No cranial nerve deficit.      Coordination: Coordination normal.   Psychiatric:         Mood and Affect: Mood normal.         Behavior: Behavior normal.         Thought Content: Thought content normal.         Judgment: Judgment normal.         Fluids    Intake/Output Summary (Last 24 hours) at 10/24/2020 1128  Last data filed at 10/24/2020 0915  Gross per 24 hour   Intake 1930 ml   Output 410 ml   Net 1520 ml       Laboratory  Recent Labs     10/22/20  0047 10/23/20  0406 10/24/20  0444   WBC 4.2* 3.8* 3.5*   RBC 3.47* 3.47* 3.45*   HEMOGLOBIN 9.1* 9.3* 9.2*   HEMATOCRIT 28.2* 28.5* 28.1*   MCV 81.3* 82.1 81.4   MCH 26.2* 26.8* 26.7*   MCHC 32.3* 32.6* 32.7*   RDW 52.7* 54.3* 55.3*   PLATELETCT 44* 52* 51*   MPV  --  10.6 10.9     Recent Labs     10/22/20  0047 10/23/20  0406 10/24/20  0444   SODIUM 131* 131* 129*   POTASSIUM 4.0 4.1 3.7   CHLORIDE 100 100 96   CO2 19* 18* 23   GLUCOSE 95 117* 117*   BUN 23* 32* 34*   CREATININE 3.06* 3.85* 3.77*   CALCIUM 7.5* 7.8* 7.9*         No results for input(s): NTPROBNP in the last 72 hours.        Imaging  Renal US reviewed    Assessment/Plan  1.AROLDO from combination of paracenthesis/diuresis/Vanc toxicity -improving  2.HTN: low BP better -to keep MAP> 65  3.Electrolytes: hyponatremia  -slightly worse  4.Anemia: Hb stable  5.ETOH liver cirrhosis -f.u with GI  6.Volume:well  controlled-may d/c IVF  7.Metabolic acidosis corrected -d/c bicarb gtt      Recs  D/c bicarb gtt  Encourage  solid food intake  Avoid nephrotoxic agents  Daily BMP  No need for emergent dialysis  D/w

## 2020-10-24 NOTE — CARE PLAN
Problem: Safety  Goal: Will remain free from injury  Intervention: Educate patient and significant other/support system about adaptive mobility strategies and safe transfers  Note: Educated about calling for assistance when needed      Problem: Venous Thromboembolism (VTW)/Deep Vein Thrombosis (DVT) Prevention:  Goal: Patient will participate in Venous Thrombosis (VTE)/Deep Vein Thrombosis (DVT)Prevention Measures  Intervention: Assess and monitor for anticoagulation complications  Note: Assessing and monitoring for anticoagulation issues      Problem: Urinary Elimination:  Goal: Ability to reestablish a normal urinary elimination pattern will improve  Intervention: Assess and monitor for signs and symptoms of urinary retention  Note: Bladder scans being done

## 2020-10-24 NOTE — PROGRESS NOTES
Hospital Medicine Daily Progress Note    Date of Service  10/24/2020    Chief Complaint  45 y.o. male admitted 10/19/2020 with abdominal pain    Hospital Course    45-year-old male with a history of alcoholic cirrhosis complicated by esophageal varices status post banding (9/2019) and ascites who presented on 10/19/2020 with severe acute abdominal pain reported in right lower quadrant.  He denied any fever/chills or GI bleeding.  Patient denies regular alcohol use but does report 2-3 shots of alcohol on day of admission.  CT abdomen showed possible enteritis and massive ascites as well as gastric/esophageal varices.  Patient underwent paracentesis on 10/19/2020 with improvement in abdominal pain.  They removed 4 L.  His initial blood cultures grew 2/2 staph haemolyticus.  TTE normal.  Ascitic fluid cx NGTD.  ID consulted He was treated with vancomycin-->daptomycin then linezolid to complete course. His hospital course was c/b AROLDO due to vancomycin with high vanc trough as well as paracentesis, liver disease and lasix admin. Nephrology was consulted.         Interval Problem Update  - Cr slightly improved 3.85-->3.77, UOP still minimal 390, bicarb improved on bicarb gtt yesterday, consulted nephrology yesterday, will dc IVF today  - Abdomen more distended today  - Good PO intake, no n/v  - Afebrile  - Switched to linezolid yesterday  - on RA    Consultants/Specialty  ID (signed off)  Nephrology    Code Status  Full Code    Disposition  TBD, likely home when medically clear    Review of Systems  Review of Systems   Constitutional: Negative for chills and fever.   HENT: Negative for hearing loss and nosebleeds.    Eyes: Negative for blurred vision.   Respiratory: Negative for shortness of breath.    Cardiovascular: Negative for chest pain and leg swelling.   Gastrointestinal: Negative for abdominal pain, blood in stool, melena, nausea and vomiting.   Genitourinary: Negative for hematuria.   Musculoskeletal: Negative  for falls.   Skin: Negative for rash.   Neurological: Negative for seizures and loss of consciousness.   Psychiatric/Behavioral: The patient is not nervous/anxious.         Physical Exam  Temp:  [36.5 °C (97.7 °F)-36.7 °C (98 °F)] 36.7 °C (98 °F)  Pulse:  [66-89] 77  Resp:  [16] 16  BP: ()/(55-70) 102/67  SpO2:  [100 %] 100 %    Physical Exam  Vitals signs and nursing note reviewed.   Constitutional:       General: He is not in acute distress.     Appearance: He is not toxic-appearing or diaphoretic.   HENT:      Head: Normocephalic and atraumatic.      Nose: Nose normal.      Mouth/Throat:      Mouth: Mucous membranes are moist.   Eyes:      General: No scleral icterus.        Right eye: No discharge.         Left eye: No discharge.      Conjunctiva/sclera: Conjunctivae normal.   Neck:      Musculoskeletal: Normal range of motion.   Cardiovascular:      Rate and Rhythm: Normal rate and regular rhythm.      Heart sounds: Normal heart sounds.   Pulmonary:      Effort: Pulmonary effort is normal.      Breath sounds: Normal breath sounds.   Abdominal:      General: Bowel sounds are normal. There is distension.      Palpations: Abdomen is soft.      Tenderness: There is no abdominal tenderness.   Musculoskeletal:      Right lower leg: No edema.      Left lower leg: No edema.   Skin:     General: Skin is warm and dry.   Neurological:      Mental Status: He is alert and oriented to person, place, and time.      Motor: No tremor.      Gait: Gait normal.   Psychiatric:         Mood and Affect: Mood normal.         Behavior: Behavior normal.         Fluids    Intake/Output Summary (Last 24 hours) at 10/24/2020 0936  Last data filed at 10/24/2020 0600  Gross per 24 hour   Intake 1690 ml   Output 390 ml   Net 1300 ml       Laboratory  Recent Labs     10/22/20  0047 10/23/20  0406 10/24/20  0444   WBC 4.2* 3.8* 3.5*   RBC 3.47* 3.47* 3.45*   HEMOGLOBIN 9.1* 9.3* 9.2*   HEMATOCRIT 28.2* 28.5* 28.1*   MCV 81.3* 82.1 81.4  "  MCH 26.2* 26.8* 26.7*   MCHC 32.3* 32.6* 32.7*   RDW 52.7* 54.3* 55.3*   PLATELETCT 44* 52* 51*   MPV  --  10.6 10.9     Recent Labs     10/22/20  0047 10/23/20  0406 10/24/20  0444   SODIUM 131* 131* 129*   POTASSIUM 4.0 4.1 3.7   CHLORIDE 100 100 96   CO2 19* 18* 23   GLUCOSE 95 117* 117*   BUN 23* 32* 34*   CREATININE 3.06* 3.85* 3.77*   CALCIUM 7.5* 7.8* 7.9*              Results     Procedure Component Value Units Date/Time    Fluid Culture with Gram Stain [[id number]] Collected: 10/19/20 1140    Order Status: Completed Specimen: Body Fluid from Peritoneal Fluid Updated: 10/22/20 0811     Significant Indicator NEG     Source BF     Site Ascites Fluid     Culture Result No growth at 72 hours.     Gram Stain Result Rare WBCs.  No organisms seen.      Narrative:      Peritoneal Fluid    URINE CULTURE-EXISTING-LESS THAN 48 HOURS [[id number]] Collected: 10/20/20 0923    Order Status: Completed Specimen: Urine, Clean Catch Updated: 10/21/20 1743    Narrative:      Indication for culture:->Evaluation for sepsis without a  clear source of infection    BLOOD CULTURE [[id number]]  (Abnormal) Collected: 10/19/20 0145    Order Status: Completed Specimen: Blood from Peripheral Updated: 10/21/20 1241     Significant Indicator POS     Source BLD     Site PERIPHERAL     Culture Result Growth detected by Bactec instrument. 10/20/2020  06:44      Staphylococcus haemolyticus  See previous culture for sensitivity report.      Narrative:      CALL  Denise  MS5 tel. [phone],  CALLED  MS5 tel. [phone] 10/20/2020, 06:49, RB PERF. RESULTS CALLED  TO:[id number] RN  Per Hospital Policy: Only change Specimen Src: to \"Line\" if  specified by physician order.  Left AC    BLOOD CULTURE [[id number]]  (Abnormal)  (Susceptibility) Collected: 10/19/20 0300    Order Status: Completed Specimen: Blood from Peripheral Updated: 10/21/20 1240     Significant Indicator POS     Source BLD     Site PERIPHERAL     Culture Result Growth detected by Bactec " "instrument. 10/19/2020  21:47   CORRECTED REPORT  Correct result:  Negative for Staphylococcus aureus and MRSA by PCR. Correlate  ongoing need for antibiotics with clinical condition.  Erroneous result:Staphylococcus aureus (methicillin  sensitive)  detected by PCR.        Staphylococcus haemolyticus  This isolate is presumed to be clindamycin resistant based on  detection of inducible resistance.  Clindamycin may still  be effective in some patients.      Narrative:      CALL  Denise  MS5 tel. [phone],  CALLED  MS5 tel. [phone] 10/20/2020, 07:44, RB PERF. RESULTS CALLED  TO:[id number] and Cindy pharm  Per Hospital Policy: Only change Specimen Src: to \"Line\" if  specified by physician order.  No site indicated    Susceptibility     Staphylococcus haemolyticus (1)     Antibiotic Interpretation Microscan Method Status    Azithromycin Resistant >4 mcg/mL SAUL Final    Clindamycin Resistant <=0.5 mcg/mL SAUL Final    Cefazolin Resistant <=8 mcg/mL SAUL Final    Daptomycin Sensitive <=1 mcg/mL SAUL Final    Ampicillin/sulbactam Resistant <=8/4 mcg/mL SAUL Final    Erythromycin Resistant >4 mcg/mL SAUL Final    Vancomycin Sensitive 1 mcg/mL SAUL Final    Oxacillin Resistant >2 mcg/mL SAUL Final    Penicillin Resistant 8 mcg/mL SAUL Final    Trimeth/Sulfa Sensitive <=0.5/9.5 mcg/mL SAUL Final    Tetracycline Resistant >8 mcg/mL SAUL Final                   BLOOD CULTURE [[id number]] Collected: 10/20/20 0854    Order Status: Completed Specimen: Blood from Peripheral Updated: 10/21/20 0712     Significant Indicator NEG     Source BLD     Site PERIPHERAL     Culture Result No Growth  Note: Blood cultures are incubated for 5 days and  are monitored continuously.Positive blood cultures  are called to the RN and reported as soon as  they are identified.      Narrative:      Per Hospital Policy: Only change Specimen Src: to \"Line\" if  specified by physician order.  No site indicated    BLOOD CULTURE [[id number]] Collected: 10/20/20 1215    " "Order Status: Completed Specimen: Blood from Peripheral Updated: 10/21/20 0712     Significant Indicator NEG     Source BLD     Site PERIPHERAL     Culture Result No Growth  Note: Blood cultures are incubated for 5 days and  are monitored continuously.Positive blood cultures  are called to the RN and reported as soon as  they are identified.      Narrative:      Per Hospital Policy: Only change Specimen Src: to \"Line\" if  specified by physician order.  Right AC    MRSA By PCR (Amp) [[id number]] Collected: 10/20/20 0001    Order Status: Completed Specimen: Respirate from Nares Updated: 10/20/20 1550     Significant Indicator NEG     Source RESP     Site NARES     MRSA PCR Negative for MRSA by PCR.    Narrative:      Collected By:[id number] YUDY BAKER  Collected By:[id number] YUDY BAKER    URINALYSIS CULTURE, IF INDICATED [[id number]]  (Abnormal) Collected: 10/20/20 0923    Order Status: Completed Specimen: Urine, Clean Catch Updated: 10/20/20 1037     Color Karla     Character Clear     Specific Gravity 1.025     Ph 5.5     Glucose Negative mg/dL      Ketones Trace mg/dL      Protein Negative mg/dL      Bilirubin Moderate     Urobilinogen, Urine 0.2     Nitrite Positive     Leukocyte Esterase Negative     Occult Blood Moderate     Micro Urine Req Microscopic    COVID/SARS CoV-2 PCR [[id number]] Collected: 10/20/20 0001    Order Status: Completed Specimen: Respirate from Nasopharyngeal Updated: 10/20/20 0011     COVID Order Status Received     Comment: The order for SARS CoV-2 testing has been received by the  Laboratory. This result is neither positive nor negative.  Final results of testing will report in 24-48 hours, separately.         Narrative:      Is patient being admitted?->Yes  Does this patient meet criteria for Rush/Cepheid per [facility]  Inpatient Workflow? (See workflow link below)->No  Expected turn around time?->Routine (In-House PCR up to 24  hours)  Is this the patients First SARS CoV-2 test?->Unknown  Is this " patient employed in healthcare?->No  Is the patient symptomatic as defined by the CDC?->No  Is the patient hospitalized?->Yes  Is the patient in the ICU?->No  Is the patient a resident in a congregate care setting?->No  Is the patient pregnant?->No    GRAM STAIN [[id number]] Collected: 10/19/20 1140    Order Status: Completed Specimen: Body Fluid Updated: 10/19/20 1658     Significant Indicator .     Source BF     Site Ascites Fluid     Gram Stain Result Rare WBCs.  No organisms seen.      Narrative:      Peritoneal Fluid    SARS-CoV-2, PCR (In-House) [[id number]] Collected: 10/19/20 0145    Order Status: Completed Updated: 10/19/20 1259     SARS-CoV-2 Source NP Swab     SARS-CoV-2 by PCR NotDetected     Comment: [facility] providers: PLEASE REFER TO DE-ESCALATION AND RETESTING PROTOCOL  on [email or web address]  **The TaqPath COVID-19 SARS-CoV-2 test has been made available for use under  the Emergency Use Authorization (EUA) only.         Narrative:      Is patient being admitted?->Yes  Does this patient meet criteria for Rush/Cepheid per [facility]  Inpatient Workflow? (See workflow link below)->No  Expected turn around time?->Routine (In-House PCR up to 24  hours)  Is this the patients First SARS CoV-2 test?->Unknown  Is this patient employed in healthcare?->No  Is the patient symptomatic as defined by the CDC?->No  Is the patient hospitalized?->Yes  Is the patient in the ICU?->No  Is the patient a resident in a congregate care setting?->No  Is the patient pregnant?->No    COVID/SARS CoV-2 PCR [[id number]] Collected: 10/19/20 0145    Order Status: Completed Specimen: Respirate from Nasopharyngeal Updated: 10/19/20 0156     COVID Order Status Received     Comment: The order for SARS CoV-2 testing has been received by the  Laboratory. This result is neither positive nor negative.  Final results of testing will report in 24-48 hours, separately.         Narrative:      Is patient being admitted?->Yes  Does this patient meet  criteria for Rush/Cepheid [facility]  In[facility] Work[facility]? (See workflow link below)->No  Expected turn around time?->Routine (In-House PCR up to 24  hours)  Is this the patients First SARS CoV-2 test?->Unknown  Is this patient employed in healthcare?->No  Is the patient symptomatic as defined by the CDC?->No  Is the patient hospitalized?->Yes  Is the patient in the ICU?->No  Is the patient a resident in a congregate care setting?->No  Is the patient pregnant?->No    BLOOD CULTURE (Child) [[id number]] Collected: 10/19/20 0127    Order Status: Canceled Specimen: Other from Peripheral                 Imaging  US-RENAL   Final Result      1.  Normal appearance of the kidneys      2.  Minimal post void residual of 13 mL      3.  However, with a bladder volume of only 66 mL she had the urge to void which is abnormal      EC-ECHOCARDIOGRAM COMPLETE W/O CONT   Final Result      US-PARACENTESIS, ABD WITH IMAGING   Final Result      1. Ultrasound-guided diagnostic paracentesis of the right lower quadrant of the abdominal wall.      2. 1000 mL of fluid withdrawn.      CT-ABDOMEN-PELVIS WITH   Final Result         1.  Small bowel mucosal thickening, appearance suggests enteritis.   2.  Changes of cirrhosis with massive abdominal ascites   3.  Esophageal and gastric varices   4.  Left inguinal hernia containing ascites   5.  Umbilical hernia containing ascites   6.  Splenomegaly      DX-CHEST-PORTABLE (1 VIEW)   Final Result         1.  No acute cardiopulmonary disease.   2.  Perihilar interstitial prominence and bronchial wall cuffing, appearance suggests changes of underlying bronchial inflammation, consider bronchitis.           Assessment/Plan  * Ascites- (present on admission)  Assessment & Plan  Pt with worsening RLQ abdominal pain and distention for the past few months  Currently denies any fevers, chills, N/V, diarrhea, or constipation  CT abd w/ possible enteritis and massive ascites as well as gastric/esophageal  varices  - therapeutic/diagnostic paracentesis  - ascitic fluid , cx NG  - analgesics  - monitor BP  - c/w home Propranolol   - Alcohol cessation counseling  - Takes lasix outpatient, restarted but then Cr increased so now on hold  - needs GI outpatient follow up    AROLDO (acute kidney injury) (HCC)  Assessment & Plan  Likely in the setting of vanc (vanc trough > 40) +/- lasix, paracentesis (removed 4L) and liver disease  CT abdomen/pelvis with normal kidneys and ureters  Renal u/s normal  Still urinating but output decreased  UA with positive nitrite, moderate occult blood, negative protein, ucx pending, denies dysuria, ucx neg  Nephrology following: bicarb gtt 10/23, dc 10/24  Cr improving slightly      Bacteremia  Assessment & Plan  2/2 blood cx growing CoN staph: staph haemolyticus  Source? No hardware, denies back pain or joint pain, abdominal pain on admission s/p para, ascitic cx NGTD  vanc-->ancef -->vanc-->daptomycin-->linezolid 10/23-10/26  Repeat bcx NGTD  TTE normal    Pancytopenia (HCC)  Assessment & Plan  Likely in setting of cirrhosis, no neutropenia  H/H and plt dropped after procedure, but vital signs normal and no obvious bleeding  CTM    Enteritis- (present on admission)  Assessment & Plan  CT abd w/ Small bowel mucosal thickening, appearance suggests enteritis.  Pt with pinching abd pain but otherwise denies further Sx at this time  Abdominal pain resolved with paracentesis       Alcohol abuse- (present on admission)  Assessment & Plan  Pt with alcohol cirrhosis  Denies routine drinking, but admits he drank 2-3 shots on day of admission  Alcohol level 366  - s/p CIWA monitoring  - Multivitamin and RALLY Bag  - Thiamine 1mg qd x4 doses and Folic Acid 1mg qd x4 doses  - Seizure/Fall precautions  - Alcohol cessation counseling      VTE: SCDs       VTE prophylaxis: SCDs (d/t thrombocytopenia), ambulatory

## 2020-10-24 NOTE — PROGRESS NOTES
Received report from morning RN. Assumed pt care at 1900. Patient is AAOX4. Stomach is rounded but non-tender. No complaints of pain during assessment. Call light is in reach. Bed lowered and locked.

## 2020-10-25 LAB
ALBUMIN SERPL BCP-MCNC: 2.2 G/DL (ref 3.2–4.9)
BACTERIA BLD CULT: NORMAL
BACTERIA BLD CULT: NORMAL
BUN SERPL-MCNC: 38 MG/DL (ref 8–22)
CALCIUM SERPL-MCNC: 8 MG/DL (ref 8.5–10.5)
CHLORIDE SERPL-SCNC: 94 MMOL/L (ref 96–112)
CO2 SERPL-SCNC: 23 MMOL/L (ref 20–33)
CREAT SERPL-MCNC: 4.19 MG/DL (ref 0.5–1.4)
GLUCOSE SERPL-MCNC: 91 MG/DL (ref 65–99)
PHOSPHATE SERPL-MCNC: 4 MG/DL (ref 2.5–4.5)
POTASSIUM SERPL-SCNC: 3.3 MMOL/L (ref 3.6–5.5)
SIGNIFICANT IND 70042: NORMAL
SIGNIFICANT IND 70042: NORMAL
SITE SITE: NORMAL
SITE SITE: NORMAL
SODIUM SERPL-SCNC: 128 MMOL/L (ref 135–145)
SOURCE SOURCE: NORMAL
SOURCE SOURCE: NORMAL

## 2020-10-25 PROCEDURE — A9270 NON-COVERED ITEM OR SERVICE: HCPCS | Performed by: INTERNAL MEDICINE

## 2020-10-25 PROCEDURE — A9270 NON-COVERED ITEM OR SERVICE: HCPCS | Performed by: STUDENT IN AN ORGANIZED HEALTH CARE EDUCATION/TRAINING PROGRAM

## 2020-10-25 PROCEDURE — 99233 SBSQ HOSP IP/OBS HIGH 50: CPT | Performed by: INTERNAL MEDICINE

## 2020-10-25 PROCEDURE — 36415 COLL VENOUS BLD VENIPUNCTURE: CPT

## 2020-10-25 PROCEDURE — 700102 HCHG RX REV CODE 250 W/ 637 OVERRIDE(OP): Performed by: STUDENT IN AN ORGANIZED HEALTH CARE EDUCATION/TRAINING PROGRAM

## 2020-10-25 PROCEDURE — 700111 HCHG RX REV CODE 636 W/ 250 OVERRIDE (IP): Performed by: INTERNAL MEDICINE

## 2020-10-25 PROCEDURE — 80069 RENAL FUNCTION PANEL: CPT

## 2020-10-25 PROCEDURE — 700102 HCHG RX REV CODE 250 W/ 637 OVERRIDE(OP): Performed by: INTERNAL MEDICINE

## 2020-10-25 PROCEDURE — P9047 ALBUMIN (HUMAN), 25%, 50ML: HCPCS | Performed by: INTERNAL MEDICINE

## 2020-10-25 PROCEDURE — 770006 HCHG ROOM/CARE - MED/SURG/GYN SEMI*

## 2020-10-25 RX ORDER — MIDODRINE HYDROCHLORIDE 5 MG/1
2.5 TABLET ORAL
Status: DISCONTINUED | OUTPATIENT
Start: 2020-10-25 | End: 2020-10-26

## 2020-10-25 RX ORDER — ALBUMIN (HUMAN) 12.5 G/50ML
25 SOLUTION INTRAVENOUS EVERY 6 HOURS
Status: COMPLETED | OUTPATIENT
Start: 2020-10-25 | End: 2020-10-26

## 2020-10-25 RX ADMIN — DOCUSATE SODIUM 50 MG AND SENNOSIDES 8.6 MG 2 TABLET: 8.6; 5 TABLET, FILM COATED ORAL at 17:30

## 2020-10-25 RX ADMIN — OMEPRAZOLE 40 MG: 20 CAPSULE, DELAYED RELEASE ORAL at 17:29

## 2020-10-25 RX ADMIN — LINEZOLID 600 MG: 600 TABLET, FILM COATED ORAL at 17:29

## 2020-10-25 RX ADMIN — ALBUMIN (HUMAN) 25 G: 5 SOLUTION INTRAVENOUS at 13:25

## 2020-10-25 RX ADMIN — MIDODRINE HYDROCHLORIDE 2.5 MG: 5 TABLET ORAL at 17:29

## 2020-10-25 RX ADMIN — PROPRANOLOL HYDROCHLORIDE 10 MG: 10 TABLET ORAL at 05:49

## 2020-10-25 RX ADMIN — LINEZOLID 600 MG: 600 TABLET, FILM COATED ORAL at 05:49

## 2020-10-25 RX ADMIN — MIDODRINE HYDROCHLORIDE 2.5 MG: 5 TABLET ORAL at 12:13

## 2020-10-25 RX ADMIN — Medication 400 MG: at 17:29

## 2020-10-25 RX ADMIN — ALBUMIN (HUMAN) 25 G: 5 SOLUTION INTRAVENOUS at 17:25

## 2020-10-25 RX ADMIN — OMEPRAZOLE 40 MG: 20 CAPSULE, DELAYED RELEASE ORAL at 05:49

## 2020-10-25 RX ADMIN — ALBUMIN (HUMAN) 25 G: 5 SOLUTION INTRAVENOUS at 23:21

## 2020-10-25 RX ADMIN — Medication 400 MG: at 05:49

## 2020-10-25 ASSESSMENT — ENCOUNTER SYMPTOMS
EYES NEGATIVE: 1
SEIZURES: 0
CHILLS: 0
SHORTNESS OF BREATH: 0
LOSS OF CONSCIOUSNESS: 0
NERVOUS/ANXIOUS: 0
COUGH: 0
ABDOMINAL PAIN: 0
BLOOD IN STOOL: 0
FALLS: 0
VOMITING: 0
BLURRED VISION: 0
WHEEZING: 0
DIARRHEA: 0
WEIGHT LOSS: 0
SINUS PAIN: 0
FLANK PAIN: 0
FEVER: 0
PALPITATIONS: 0
NAUSEA: 0
HEMOPTYSIS: 0
ORTHOPNEA: 0

## 2020-10-25 ASSESSMENT — PAIN DESCRIPTION - PAIN TYPE
TYPE: ACUTE PAIN
TYPE: ACUTE PAIN

## 2020-10-25 NOTE — PROGRESS NOTES
Received bedside report from offgoing nurse, Sun. Assumed care at 1900. Patient is awake/alert. Assessment completed. Pt on room air. PIV flushed and saline locked. Bed locked in lowest position. Call light within reach. Whiteboard updates with nurse's and CNA's numbers. Hourly rounding in place.

## 2020-10-25 NOTE — CARE PLAN
Problem: Safety  Goal: Will remain free from injury  Outcome: PROGRESSING AS EXPECTED  Note: Bed in locked and lowest position. Three side rails up. Call light within reach. Patient instructed on use of call bell and how to call for assistance. Threaded socks in use. Objects in room cleared for ambulation. No bed alarm, pt is upself.      Problem: Knowledge Deficit  Goal: Knowledge of disease process/condition, treatment plan, diagnostic tests, and medications will improve  Outcome: PROGRESSING AS EXPECTED  Note: This RN educated pt about medications, interventions, assessments. Pt is alert and oriented. All questions answered.

## 2020-10-25 NOTE — PROGRESS NOTES
Hospital Medicine Daily Progress Note    Date of Service  10/25/2020    Chief Complaint  45 y.o. male admitted 10/19/2020 with abdominal pain    Hospital Course    45-year-old male with a history of alcoholic cirrhosis complicated by esophageal varices status post banding (9/2019) and ascites who presented on 10/19/2020 with severe acute abdominal pain reported in right lower quadrant.  He denied any fever/chills or GI bleeding.  Patient denies regular alcohol use but does report 2-3 shots of alcohol on day of admission.  CT abdomen showed possible enteritis and massive ascites as well as gastric/esophageal varices.  Patient underwent paracentesis on 10/19/2020 with improvement in abdominal pain.  They removed 4 L.  His initial blood cultures grew 2/2 staph haemolyticus.  TTE normal.  Ascitic fluid cx NGTD.  ID consulted He was treated with vancomycin-->daptomycin then linezolid to complete course. His hospital course was c/b AROLDO due to vancomycin with high vanc trough as well as paracentesis, liver disease and lasix admin. Nephrology was consulted.         Interval Problem Update  - Cr worsened today, UOP improved 890, IVF dc'ed yesterday, did drink quite a bit, having more abdomina distention today, asking for another repeat paracentesis, nephrology recommending albumin and midodrine for possible hepatorenal syndrome  - frustrated and tearful today regarding hospitalization and being far from family/friends/home physicians, asking for discharge today however discussed risks of discharge  - Afebrile  - On RA    Consultants/Specialty  ID (signed off)  Nephrology    Code Status  Full Code    Disposition  TBD, likely home when medically clear    Review of Systems  Review of Systems   Constitutional: Negative for chills and fever.   HENT: Negative for hearing loss and nosebleeds.    Eyes: Negative for blurred vision.   Respiratory: Negative for shortness of breath.    Cardiovascular: Negative for chest pain and leg  swelling.   Gastrointestinal: Negative for abdominal pain, blood in stool, melena, nausea and vomiting.   Genitourinary: Negative for hematuria.   Musculoskeletal: Negative for falls.   Skin: Negative for rash.   Neurological: Negative for seizures and loss of consciousness.   Psychiatric/Behavioral: The patient is not nervous/anxious.         Physical Exam  Temp:  [36.5 °C (97.7 °F)-37 °C (98.6 °F)] 37 °C (98.6 °F)  Pulse:  [73-89] 73  Resp:  [15-18] 15  BP: (102-115)/(57-65) 108/61  SpO2:  [98 %-100 %] 98 %    Physical Exam  Vitals signs and nursing note reviewed.   Constitutional:       General: He is not in acute distress.     Appearance: He is not toxic-appearing or diaphoretic.   HENT:      Head: Normocephalic and atraumatic.      Nose: Nose normal.      Mouth/Throat:      Mouth: Mucous membranes are moist.   Eyes:      General: No scleral icterus.        Right eye: No discharge.         Left eye: No discharge.      Conjunctiva/sclera: Conjunctivae normal.   Neck:      Musculoskeletal: Normal range of motion.   Cardiovascular:      Rate and Rhythm: Normal rate and regular rhythm.      Heart sounds: Normal heart sounds.   Pulmonary:      Effort: Pulmonary effort is normal.      Breath sounds: Normal breath sounds.   Abdominal:      General: Bowel sounds are normal. There is distension.      Palpations: Abdomen is soft.      Tenderness: There is no abdominal tenderness.   Musculoskeletal:      Right lower leg: No edema.      Left lower leg: No edema.   Skin:     General: Skin is warm and dry.   Neurological:      Mental Status: He is alert and oriented to person, place, and time.      Motor: No tremor.      Gait: Gait normal.   Psychiatric:         Mood and Affect: Mood is depressed. Affect is tearful.         Behavior: Behavior normal.         Fluids    Intake/Output Summary (Last 24 hours) at 10/25/2020 1800  Last data filed at 10/25/2020 3569  Gross per 24 hour   Intake 1554 ml   Output 770 ml   Net 784 ml  "      Laboratory  Recent Labs     10/23/20  0406 10/24/20  0444   WBC 3.8* 3.5*   RBC 3.47* 3.45*   HEMOGLOBIN 9.3* 9.2*   HEMATOCRIT 28.5* 28.1*   MCV 82.1 81.4   MCH 26.8* 26.7*   MCHC 32.6* 32.7*   RDW 54.3* 55.3*   PLATELETCT 52* 51*   MPV 10.6 10.9     Recent Labs     10/23/20  0406 10/24/20  0444 10/25/20  0314   SODIUM 131* 129* 128*   POTASSIUM 4.1 3.7 3.3*   CHLORIDE 100 96 94*   CO2 18* 23 23   GLUCOSE 117* 117* 91   BUN 32* 34* 38*   CREATININE 3.85* 3.77* 4.19*   CALCIUM 7.8* 7.9* 8.0*              Results     Procedure Component Value Units Date/Time    Fluid Culture with Gram Stain [[id number]] Collected: 10/19/20 1140    Order Status: Completed Specimen: Body Fluid from Peritoneal Fluid Updated: 10/22/20 0811     Significant Indicator NEG     Source BF     Site Ascites Fluid     Culture Result No growth at 72 hours.     Gram Stain Result Rare WBCs.  No organisms seen.      Narrative:      Peritoneal Fluid    URINE CULTURE-EXISTING-LESS THAN 48 HOURS [[id number]] Collected: 10/20/20 0923    Order Status: Completed Specimen: Urine, Clean Catch Updated: 10/21/20 1743    Narrative:      Indication for culture:->Evaluation for sepsis without a  clear source of infection    BLOOD CULTURE [[id number]]  (Abnormal) Collected: 10/19/20 0145    Order Status: Completed Specimen: Blood from Peripheral Updated: 10/21/20 1241     Significant Indicator POS     Source BLD     Site PERIPHERAL     Culture Result Growth detected by Bactec instrument. 10/20/2020  06:44      Staphylococcus haemolyticus  See previous culture for sensitivity report.      Narrative:      CALL  Denise  MS5 tel. [phone],  CALLED  MS5 tel. [phone] 10/20/2020, 06:49, RB PERF. RESULTS CALLED  TO:[phone] RN  Per Hospital Policy: Only change Specimen Src: to \"Line\" if  specified by physician order.  Left AC    BLOOD CULTURE [[id number]]  (Abnormal)  (Susceptibility) Collected: 10/19/20 0300    Order Status: Completed Specimen: Blood from Peripheral " "Updated: 10/21/20 1240     Significant Indicator POS     Source BLD     Site PERIPHERAL     Culture Result Growth detected by Bactec instrument. 10/19/2020  21:47   CORRECTED REPORT  Correct result:  Negative for Staphylococcus aureus and MRSA by PCR. Correlate  ongoing need for antibiotics with clinical condition.  Erroneous result:Staphylococcus aureus (methicillin  sensitive)  detected by PCR.        Staphylococcus haemolyticus  This isolate is presumed to be clindamycin resistant based on  detection of inducible resistance.  Clindamycin may still  be effective in some patients.      Narrative:      CALL  Denise  MS5 tel. [phone],  CALLED  MS5 tel. [phone] 10/20/2020, 07:44, RB PERF. RESULTS CALLED  TO:[phone] and Cindy pharm  Per Hospital Policy: Only change Specimen Src: to \"Line\" if  specified by physician order.  No site indicated    Susceptibility     Staphylococcus haemolyticus (1)     Antibiotic Interpretation Microscan Method Status    Azithromycin Resistant >4 mcg/mL SAUL Final    Clindamycin Resistant <=0.5 mcg/mL SAUL Final    Cefazolin Resistant <=8 mcg/mL SAUL Final    Daptomycin Sensitive <=1 mcg/mL SAUL Final    Ampicillin/sulbactam Resistant <=8/4 mcg/mL SAUL Final    Erythromycin Resistant >4 mcg/mL SAUL Final    Vancomycin Sensitive 1 mcg/mL SAUL Final    Oxacillin Resistant >2 mcg/mL SAUL Final    Penicillin Resistant 8 mcg/mL SAUL Final    Trimeth/Sulfa Sensitive <=0.5/9.5 mcg/mL SAUL Final    Tetracycline Resistant >8 mcg/mL SAUL Final                   BLOOD CULTURE [[id number]] Collected: 10/20/20 0854    Order Status: Completed Specimen: Blood from Peripheral Updated: 10/21/20 0712     Significant Indicator NEG     Source BLD     Site PERIPHERAL     Culture Result No Growth  Note: Blood cultures are incubated for 5 days and  are monitored continuously.Positive blood cultures  are called to the RN and reported as soon as  they are identified.      Narrative:      Per Hospital Policy: Only change " "Specimen Src: to \"Line\" if  specified by physician order.  No site indicated    BLOOD CULTURE [[id number]] Collected: 10/20/20 1215    Order Status: Completed Specimen: Blood from Peripheral Updated: 10/21/20 0712     Significant Indicator NEG     Source BLD     Site PERIPHERAL     Culture Result No Growth  Note: Blood cultures are incubated for 5 days and  are monitored continuously.Positive blood cultures  are called to the RN and reported as soon as  they are identified.      Narrative:      Per Hospital Policy: Only change Specimen Src: to \"Line\" if  specified by physician order.  Right AC    MRSA By PCR (Amp) [[id number]] Collected: 10/20/20 0001    Order Status: Completed Specimen: Respirate from Nares Updated: 10/20/20 1550     Significant Indicator NEG     Source RESP     Site NARES     MRSA PCR Negative for MRSA by PCR.    Narrative:      Collected By:[id number] YUDY BAKER  Collected By:[id number] YUDY BAKER    URINALYSIS CULTURE, IF INDICATED [[id number]]  (Abnormal) Collected: 10/20/20 0923    Order Status: Completed Specimen: Urine, Clean Catch Updated: 10/20/20 1037     Color Karla     Character Clear     Specific Gravity 1.025     Ph 5.5     Glucose Negative mg/dL      Ketones Trace mg/dL      Protein Negative mg/dL      Bilirubin Moderate     Urobilinogen, Urine 0.2     Nitrite Positive     Leukocyte Esterase Negative     Occult Blood Moderate     Micro Urine Req Microscopic    COVID/SARS CoV-2 PCR [[id number]] Collected: 10/20/20 0001    Order Status: Completed Specimen: Respirate from Nasopharyngeal Updated: 10/20/20 0011     COVID Order Status Received     Comment: The order for SARS CoV-2 testing has been received by the  Laboratory. This result is neither positive nor negative.  Final results of testing will report in 24-48 hours, separately.         Narrative:      Is patient being admitted?->Yes  Does this patient meet criteria for Rush/Cepheid per [facility]  Inpatient Workflow? (See workflow link " below)->No  Expected turn around time?->Routine (In-House PCR up to 24  hours)  Is this the patients First SARS CoV-2 test?->Unknown  Is this patient employed in healthcare?->No  Is the patient symptomatic as defined by the CDC?->No  Is the patient hospitalized?->Yes  Is the patient in the ICU?->No  Is the patient a resident in a congregate care setting?->No  Is the patient pregnant?->No    GRAM STAIN [[id number]] Collected: 10/19/20 1140    Order Status: Completed Specimen: Body Fluid Updated: 10/19/20 1658     Significant Indicator .     Source BF     Site Ascites Fluid     Gram Stain Result Rare WBCs.  No organisms seen.      Narrative:      Peritoneal Fluid    SARS-CoV-2, PCR (In-House) [[id number]] Collected: 10/19/20 0145    Order Status: Completed Updated: 10/19/20 1259     SARS-CoV-2 Source NP Swab     SARS-CoV-2 by PCR NotDetected     Comment: [facility] providers: PLEASE REFER TO DE-ESCALATION AND RETESTING PROTOCOL  on [email or web address]  **The TaqPath COVID-19 SARS-CoV-2 test has been made available for use under  the Emergency Use Authorization (EUA) only.         Narrative:      Is patient being admitted?->Yes  Does this patient meet criteria for Rush/Cepheid per [facility]  Inpatient Workflow? (See workflow link below)->No  Expected turn around time?->Routine (In-House PCR up to 24  hours)  Is this the patients First SARS CoV-2 test?->Unknown  Is this patient employed in healthcare?->No  Is the patient symptomatic as defined by the CDC?->No  Is the patient hospitalized?->Yes  Is the patient in the ICU?->No  Is the patient a resident in a congregate care setting?->No  Is the patient pregnant?->No    COVID/SARS CoV-2 PCR [[id number]] Collected: 10/19/20 0145    Order Status: Completed Specimen: Respirate from Nasopharyngeal Updated: 10/19/20 0156     COVID Order Status Received     Comment: The order for SARS CoV-2 testing has been received by the  Laboratory. This result is neither positive nor negative.  Final  results of testing will report in 24-48 hours, separately.         Narrative:      Is patient being admitted?->Yes  Does this patient meet criteria for Rush/Cepheid per [facility]  Inpatient Workflow? (See workflow link below)->No  Expected turn around time?->Routine (In-House PCR up to 24  hours)  Is this the patients First SARS CoV-2 test?->Unknown  Is this patient employed in healthcare?->No  Is the patient symptomatic as defined by the CDC?->No  Is the patient hospitalized?->Yes  Is the patient in the ICU?->No  Is the patient a resident in a congregate care setting?->No  Is the patient pregnant?->No    BLOOD CULTURE (Child) [[id number]] Collected: 10/19/20 0127    Order Status: Canceled Specimen: Other from Peripheral                 Imaging  US-RENAL   Final Result      1.  Normal appearance of the kidneys      2.  Minimal post void residual of 13 mL      3.  However, with a bladder volume of only 66 mL she had the urge to void which is abnormal      EC-ECHOCARDIOGRAM COMPLETE W/O CONT   Final Result      US-PARACENTESIS, ABD WITH IMAGING   Final Result      1. Ultrasound-guided diagnostic paracentesis of the right lower quadrant of the abdominal wall.      2. 1000 mL of fluid withdrawn.      CT-ABDOMEN-PELVIS WITH   Final Result         1.  Small bowel mucosal thickening, appearance suggests enteritis.   2.  Changes of cirrhosis with massive abdominal ascites   3.  Esophageal and gastric varices   4.  Left inguinal hernia containing ascites   5.  Umbilical hernia containing ascites   6.  Splenomegaly      DX-CHEST-PORTABLE (1 VIEW)   Final Result         1.  No acute cardiopulmonary disease.   2.  Perihilar interstitial prominence and bronchial wall cuffing, appearance suggests changes of underlying bronchial inflammation, consider bronchitis.           Assessment/Plan  * Ascites- (present on admission)  Assessment & Plan  Pt with worsening RLQ abdominal pain and distention for the past few months  Currently denies  any fevers, chills, N/V, diarrhea, or constipation  CT abd w/ possible enteritis and massive ascites as well as gastric/esophageal varices  - therapeutic/diagnostic paracentesis  - ascitic fluid , cx NG  - analgesics  - monitor BP  - c/w home Propranolol   - Alcohol cessation counseling  - Takes lasix and spironolactone outpatient, restarted lasix but then Cr increased so now on hold  - needs GI outpatient follow up    AROLDO (acute kidney injury) (HCC)  Assessment & Plan  Likely in the setting of vanc (vanc trough > 40) +/- lasix, paracentesis (removed 4L) and liver disease  CT abdomen/pelvis with normal kidneys and ureters  Renal u/s normal  Still urinating   UA with positive nitrite, moderate occult blood, negative protein, ucx pending, denies dysuria, ucx neg  Nephrology following: bicarb gtt 10/23, dc 10/24  Cr worsening 3.77-->4.19        Bacteremia  Assessment & Plan  2/2 blood cx growing CoN staph: staph haemolyticus  Source? No hardware, denies back pain or joint pain, abdominal pain on admission s/p para, ascitic cx NGTD  vanc-->ancef -->vanc-->daptomycin-->linezolid 10/23-10/26  Repeat bcx NGTD  TTE normal    Hyponatremia  Assessment & Plan  Likely in setting of AROLDO and bicarb gtt, dc bicarb gtt today (10/24)  CTM    Pancytopenia (HCC)  Assessment & Plan  Likely in setting of cirrhosis, no neutropenia  H/H and plt dropped after procedure, but vital signs normal and no obvious bleeding  CTM    Enteritis- (present on admission)  Assessment & Plan  CT abd w/ Small bowel mucosal thickening, appearance suggests enteritis.  Pt with pinching abd pain but otherwise denies further Sx at this time  Abdominal pain resolved with paracentesis       Alcohol abuse- (present on admission)  Assessment & Plan  Pt with alcohol cirrhosis  Denies routine drinking, but admits he drank 2-3 shots on day of admission  Alcohol level 366  - s/p CIWA monitoring  - Multivitamin and RALLY Bag  - Thiamine 1mg qd x4 doses and Folic  Acid 1mg qd x4 doses  - Seizure/Fall precautions  - Alcohol cessation counseling      VTE: SCDs       VTE prophylaxis: SCDs (d/t thrombocytopenia), ambulatory

## 2020-10-25 NOTE — CARE PLAN
Problem: Safety  Goal: Will remain free from falls  Outcome: PROGRESSING AS EXPECTED     Problem: Bowel/Gastric:  Goal: Will not experience complications related to bowel motility  Outcome: PROGRESSING AS EXPECTED     Problem: Discharge Barriers/Planning  Goal: Patient's continuum of care needs will be met  Outcome: PROGRESSING AS EXPECTED

## 2020-10-25 NOTE — PROGRESS NOTES
Assumed care at 0700 after report received from night RN. Pt A/Ox4, on RA, LFA PIV site WNL. No complaints of abd pain, distended and firm. Pt on strict I/O's for renal function. Lab values show worsening kidney function today. Albumin ordered. Awaiting pharmacy to deliver

## 2020-10-25 NOTE — PROGRESS NOTES
Nephrology Daily Progress Note    Date of Service  10/25/2020    Chief Complaint  45 y.o. male with liver cirrhosis,admitted 10/19/2020 with abdominal pain worsening ascites, staph bacteremia with hospital course complicated with AROLDO    Interval Problem Update  10/24 -doing well, no complaints  No abdominal pain, no N/V  Acidosis corrected  Creat level improving  10/25 - no complaints  No abdominal pain, no N/V  Creat worse 3.77-4.19  Review of Systems  Review of Systems   Constitutional: Negative for chills, fever, malaise/fatigue and weight loss.   HENT: Negative for congestion, hearing loss and sinus pain.    Eyes: Negative.    Respiratory: Negative for cough, hemoptysis, shortness of breath and wheezing.    Cardiovascular: Negative for chest pain, palpitations, orthopnea and leg swelling.   Gastrointestinal: Negative for abdominal pain, diarrhea, nausea and vomiting.   Genitourinary: Negative for dysuria, flank pain, frequency, hematuria and urgency.   Skin: Negative.    All other systems reviewed and are negative.       Physical Exam  Temp:  [36.5 °C (97.7 °F)-37 °C (98.6 °F)] 37 °C (98.6 °F)  Pulse:  [70-89] 70  Resp:  [15-18] 15  BP: (102-115)/(57-65) 105/63  SpO2:  [98 %-100 %] 98 %    Physical Exam  Vitals signs and nursing note reviewed.   Constitutional:       General: He is not in acute distress.     Appearance: Normal appearance. He is well-developed. He is not diaphoretic.   HENT:      Head: Normocephalic and atraumatic.      Nose: Nose normal.      Mouth/Throat:      Mouth: Mucous membranes are moist.   Eyes:      Extraocular Movements: Extraocular movements intact.      Conjunctiva/sclera: Conjunctivae normal.      Pupils: Pupils are equal, round, and reactive to light.   Neck:      Musculoskeletal: Normal range of motion and neck supple.   Cardiovascular:      Rate and Rhythm: Normal rate and regular rhythm.      Pulses: Normal pulses.      Heart sounds: Normal heart sounds. No friction rub. No  gallop.    Pulmonary:      Effort: Pulmonary effort is normal. No respiratory distress.      Breath sounds: Normal breath sounds. No wheezing or rales.   Abdominal:      General: Bowel sounds are normal. There is distension.      Palpations: There is no mass.      Tenderness: There is no abdominal tenderness. There is no right CVA tenderness, left CVA tenderness or guarding.   Musculoskeletal:      Right lower leg: No edema.      Left lower leg: No edema.   Skin:     General: Skin is warm.      Findings: No erythema or rash.   Neurological:      General: No focal deficit present.      Mental Status: He is alert and oriented to person, place, and time.      Cranial Nerves: No cranial nerve deficit.      Coordination: Coordination normal.         Fluids    Intake/Output Summary (Last 24 hours) at 10/25/2020 1225  Last data filed at 10/25/2020 1015  Gross per 24 hour   Intake 1554 ml   Output 650 ml   Net 904 ml       Laboratory  Recent Labs     10/23/20  0406 10/24/20  0444   WBC 3.8* 3.5*   RBC 3.47* 3.45*   HEMOGLOBIN 9.3* 9.2*   HEMATOCRIT 28.5* 28.1*   MCV 82.1 81.4   MCH 26.8* 26.7*   MCHC 32.6* 32.7*   RDW 54.3* 55.3*   PLATELETCT 52* 51*   MPV 10.6 10.9     Recent Labs     10/23/20  0406 10/24/20  0444 10/25/20  0314   SODIUM 131* 129* 128*   POTASSIUM 4.1 3.7 3.3*   CHLORIDE 100 96 94*   CO2 18* 23 23   GLUCOSE 117* 117* 91   BUN 32* 34* 38*   CREATININE 3.85* 3.77* 4.19*   CALCIUM 7.8* 7.9* 8.0*         No results for input(s): NTPROBNP in the last 72 hours.        Imaging  Renal US reviewed    Assessment/Plan  1.AROLDO from combination of paracenthesis/diuresis/Vanc toxicity/ HRS - worse  2.HTN: low BP controlled  3.Electrolytes: hyponatremia  -slightly worse  4.Anemia: Hb stable  5.ETOH liver cirrhosis -f/u with GI  6.Volume:well controlled-may d/c IVF        Recs  Albumin, midodrin  GI consult  Encourage  solid food intake  Avoid nephrotoxic agents  Daily BMP  No need for emergent dialysis  D/w

## 2020-10-26 LAB
ALBUMIN SERPL BCP-MCNC: 3 G/DL (ref 3.2–4.9)
BUN SERPL-MCNC: 41 MG/DL (ref 8–22)
CALCIUM SERPL-MCNC: 7.7 MG/DL (ref 8.5–10.5)
CHLORIDE SERPL-SCNC: 95 MMOL/L (ref 96–112)
CO2 SERPL-SCNC: 20 MMOL/L (ref 20–33)
CREAT SERPL-MCNC: 4.27 MG/DL (ref 0.5–1.4)
ERYTHROCYTE [DISTWIDTH] IN BLOOD BY AUTOMATED COUNT: 56.5 FL (ref 35.9–50)
GLUCOSE SERPL-MCNC: 127 MG/DL (ref 65–99)
HCT VFR BLD AUTO: 26 % (ref 42–52)
HGB BLD-MCNC: 8.5 G/DL (ref 14–18)
MCH RBC QN AUTO: 26.5 PG (ref 27–33)
MCHC RBC AUTO-ENTMCNC: 32.7 G/DL (ref 33.7–35.3)
MCV RBC AUTO: 81 FL (ref 81.4–97.8)
PHOSPHATE SERPL-MCNC: 3.7 MG/DL (ref 2.5–4.5)
PLATELET # BLD AUTO: 54 K/UL (ref 164–446)
PMV BLD AUTO: 10.3 FL (ref 9–12.9)
POTASSIUM SERPL-SCNC: 3.3 MMOL/L (ref 3.6–5.5)
RBC # BLD AUTO: 3.21 M/UL (ref 4.7–6.1)
SODIUM SERPL-SCNC: 127 MMOL/L (ref 135–145)
WBC # BLD AUTO: 3.1 K/UL (ref 4.8–10.8)

## 2020-10-26 PROCEDURE — A9270 NON-COVERED ITEM OR SERVICE: HCPCS | Performed by: INTERNAL MEDICINE

## 2020-10-26 PROCEDURE — 700102 HCHG RX REV CODE 250 W/ 637 OVERRIDE(OP): Performed by: STUDENT IN AN ORGANIZED HEALTH CARE EDUCATION/TRAINING PROGRAM

## 2020-10-26 PROCEDURE — 700111 HCHG RX REV CODE 636 W/ 250 OVERRIDE (IP): Performed by: INTERNAL MEDICINE

## 2020-10-26 PROCEDURE — P9045 ALBUMIN (HUMAN), 5%, 250 ML: HCPCS | Performed by: INTERNAL MEDICINE

## 2020-10-26 PROCEDURE — 85027 COMPLETE CBC AUTOMATED: CPT

## 2020-10-26 PROCEDURE — P9047 ALBUMIN (HUMAN), 25%, 50ML: HCPCS | Performed by: INTERNAL MEDICINE

## 2020-10-26 PROCEDURE — 36415 COLL VENOUS BLD VENIPUNCTURE: CPT

## 2020-10-26 PROCEDURE — 770006 HCHG ROOM/CARE - MED/SURG/GYN SEMI*

## 2020-10-26 PROCEDURE — 80069 RENAL FUNCTION PANEL: CPT

## 2020-10-26 PROCEDURE — 99233 SBSQ HOSP IP/OBS HIGH 50: CPT | Performed by: INTERNAL MEDICINE

## 2020-10-26 PROCEDURE — 700102 HCHG RX REV CODE 250 W/ 637 OVERRIDE(OP): Performed by: INTERNAL MEDICINE

## 2020-10-26 PROCEDURE — A9270 NON-COVERED ITEM OR SERVICE: HCPCS | Performed by: STUDENT IN AN ORGANIZED HEALTH CARE EDUCATION/TRAINING PROGRAM

## 2020-10-26 RX ORDER — POTASSIUM CHLORIDE 20 MEQ/1
40 TABLET, EXTENDED RELEASE ORAL ONCE
Status: COMPLETED | OUTPATIENT
Start: 2020-10-26 | End: 2020-10-26

## 2020-10-26 RX ORDER — MIDODRINE HYDROCHLORIDE 5 MG/1
5 TABLET ORAL
Status: DISCONTINUED | OUTPATIENT
Start: 2020-10-26 | End: 2020-10-29 | Stop reason: HOSPADM

## 2020-10-26 RX ORDER — LACTULOSE 20 G/30ML
30 SOLUTION ORAL 2 TIMES DAILY
Status: DISCONTINUED | OUTPATIENT
Start: 2020-10-26 | End: 2020-10-29 | Stop reason: HOSPADM

## 2020-10-26 RX ORDER — MIDODRINE HYDROCHLORIDE 5 MG/1
5 TABLET ORAL ONCE
Status: COMPLETED | OUTPATIENT
Start: 2020-10-26 | End: 2020-10-26

## 2020-10-26 RX ORDER — ALBUMIN, HUMAN INJ 5% 5 %
25 SOLUTION INTRAVENOUS EVERY 6 HOURS
Status: COMPLETED | OUTPATIENT
Start: 2020-10-26 | End: 2020-10-28

## 2020-10-26 RX ADMIN — DOCUSATE SODIUM 50 MG AND SENNOSIDES 8.6 MG 2 TABLET: 8.6; 5 TABLET, FILM COATED ORAL at 17:25

## 2020-10-26 RX ADMIN — LACTULOSE 30 ML: 20 SOLUTION ORAL at 11:59

## 2020-10-26 RX ADMIN — ALBUMIN (HUMAN) 25 G: 2.5 SOLUTION INTRAVENOUS at 17:19

## 2020-10-26 RX ADMIN — PROPRANOLOL HYDROCHLORIDE 10 MG: 10 TABLET ORAL at 05:20

## 2020-10-26 RX ADMIN — ALBUMIN (HUMAN) 25 G: 2.5 SOLUTION INTRAVENOUS at 12:01

## 2020-10-26 RX ADMIN — LINEZOLID 600 MG: 600 TABLET, FILM COATED ORAL at 05:20

## 2020-10-26 RX ADMIN — OMEPRAZOLE 40 MG: 20 CAPSULE, DELAYED RELEASE ORAL at 05:20

## 2020-10-26 RX ADMIN — MIDODRINE HYDROCHLORIDE 5 MG: 5 TABLET ORAL at 17:25

## 2020-10-26 RX ADMIN — OMEPRAZOLE 40 MG: 20 CAPSULE, DELAYED RELEASE ORAL at 17:25

## 2020-10-26 RX ADMIN — Medication 400 MG: at 17:25

## 2020-10-26 RX ADMIN — POTASSIUM CHLORIDE 40 MEQ: 1500 TABLET, EXTENDED RELEASE ORAL at 17:25

## 2020-10-26 RX ADMIN — ALBUMIN (HUMAN) 25 G: 5 SOLUTION INTRAVENOUS at 05:17

## 2020-10-26 RX ADMIN — Medication 400 MG: at 05:20

## 2020-10-26 RX ADMIN — MIDODRINE HYDROCHLORIDE 5 MG: 5 TABLET ORAL at 09:41

## 2020-10-26 RX ADMIN — MIDODRINE HYDROCHLORIDE 5 MG: 5 TABLET ORAL at 14:53

## 2020-10-26 RX ADMIN — DOCUSATE SODIUM 50 MG AND SENNOSIDES 8.6 MG 2 TABLET: 8.6; 5 TABLET, FILM COATED ORAL at 05:20

## 2020-10-26 ASSESSMENT — ENCOUNTER SYMPTOMS
VOMITING: 0
SHORTNESS OF BREATH: 0
LOSS OF CONSCIOUSNESS: 0
FALLS: 0
SEIZURES: 0
NAUSEA: 0
CHILLS: 0
FEVER: 0
NERVOUS/ANXIOUS: 0
BLOOD IN STOOL: 0
BLURRED VISION: 0
ABDOMINAL PAIN: 0

## 2020-10-26 NOTE — PROGRESS NOTES
Hospital Medicine Daily Progress Note    Date of Service  10/26/2020    Chief Complaint  45 y.o. male admitted 10/19/2020 with abdominal pain    Hospital Course    45-year-old male with a history of alcoholic cirrhosis complicated by esophageal varices status post banding (9/2019) and ascites who presented on 10/19/2020 with severe acute abdominal pain reported in right lower quadrant.  He denied any fever/chills or GI bleeding.  Patient denies regular alcohol use but does report 2-3 shots of alcohol on day of admission.  CT abdomen showed possible enteritis and massive ascites as well as gastric/esophageal varices.  Patient underwent paracentesis on 10/19/2020 with improvement in abdominal pain.  They removed 4 L.  His initial blood cultures grew 2/2 staph haemolyticus.  TTE normal.  Ascitic fluid cx NGTD.  ID consulted He was treated with vancomycin-->daptomycin then linezolid to complete course. His hospital course was c/b AROLDO thought to be d/t vancomycin with high vanc trough as well as paracentesis, liver disease and lasix admin. Nephrology was consulted.  Did trial of albumin/midodrine for possible HRS.         Interval Problem Update  - No acute overnight events  - Cr worsening 4.19-->4.27,  (although had at least one episode where he forgot to go in urinal and flushed it), albumin/midocrine challenge for possible HRS  - Na 127, K 3.3 gave k supplementation  - Reports worsening abdominal distention  - having BMs, restarted home lactulose  - afebrile    Consultants/Specialty  ID (signed off)  Nephrology    Code Status  Full Code    Disposition  TBD, likely home when medically clear    Review of Systems  Review of Systems   Constitutional: Negative for chills and fever.   HENT: Negative for hearing loss and nosebleeds.    Eyes: Negative for blurred vision.   Respiratory: Negative for shortness of breath.    Cardiovascular: Negative for chest pain and leg swelling.   Gastrointestinal: Negative for  abdominal pain, blood in stool, melena, nausea and vomiting.   Genitourinary: Negative for hematuria.   Musculoskeletal: Negative for falls.   Skin: Negative for rash.   Neurological: Negative for seizures and loss of consciousness.   Psychiatric/Behavioral: The patient is not nervous/anxious.         Physical Exam  Temp:  [36.1 °C (97 °F)-37.2 °C (99 °F)] 36.1 °C (97 °F)  Pulse:  [76-87] 84  Resp:  [16-17] 16  BP: ()/(54-73) 104/56  SpO2:  [95 %-100 %] 95 %    Physical Exam  Vitals signs and nursing note reviewed.   Constitutional:       General: He is not in acute distress.     Appearance: He is not toxic-appearing or diaphoretic.   HENT:      Head: Normocephalic and atraumatic.      Nose: Nose normal.      Mouth/Throat:      Mouth: Mucous membranes are moist.   Eyes:      General: No scleral icterus.        Right eye: No discharge.         Left eye: No discharge.      Conjunctiva/sclera: Conjunctivae normal.   Neck:      Musculoskeletal: Normal range of motion.   Cardiovascular:      Rate and Rhythm: Normal rate and regular rhythm.      Heart sounds: Normal heart sounds.   Pulmonary:      Effort: Pulmonary effort is normal.      Breath sounds: Normal breath sounds.   Abdominal:      General: Bowel sounds are normal. There is distension.      Palpations: Abdomen is soft.      Tenderness: There is no abdominal tenderness. There is no guarding or rebound.   Musculoskeletal:      Right lower leg: No edema.      Left lower leg: No edema.   Skin:     General: Skin is warm and dry.   Neurological:      Mental Status: He is alert and oriented to person, place, and time.      Motor: No tremor.      Gait: Gait normal.   Psychiatric:         Mood and Affect: Mood and affect normal.         Behavior: Behavior normal.         Fluids    Intake/Output Summary (Last 24 hours) at 10/26/2020 1508  Last data filed at 10/26/2020 1300  Gross per 24 hour   Intake 650 ml   Output 680 ml   Net -30 ml       Laboratory  Recent Labs  "    10/24/20  0444 10/26/20  0037   WBC 3.5* 3.1*   RBC 3.45* 3.21*   HEMOGLOBIN 9.2* 8.5*   HEMATOCRIT 28.1* 26.0*   MCV 81.4 81.0*   MCH 26.7* 26.5*   MCHC 32.7* 32.7*   RDW 55.3* 56.5*   PLATELETCT 51* 54*   MPV 10.9 10.3     Recent Labs     10/24/20  0444 10/25/20  0314 10/26/20  0037   SODIUM 129* 128* 127*   POTASSIUM 3.7 3.3* 3.3*   CHLORIDE 96 94* 95*   CO2 23 23 20   GLUCOSE 117* 91 127*   BUN 34* 38* 41*   CREATININE 3.77* 4.19* 4.27*   CALCIUM 7.9* 8.0* 7.7*              Results     Procedure Component Value Units Date/Time    Fluid Culture with Gram Stain [[id number]] Collected: 10/19/20 1140    Order Status: Completed Specimen: Body Fluid from Peritoneal Fluid Updated: 10/22/20 0811     Significant Indicator NEG     Source BF     Site Ascites Fluid     Culture Result No growth at 72 hours.     Gram Stain Result Rare WBCs.  No organisms seen.      Narrative:      Peritoneal Fluid    URINE CULTURE-EXISTING-LESS THAN 48 HOURS [[id number]] Collected: 10/20/20 0923    Order Status: Completed Specimen: Urine, Clean Catch Updated: 10/21/20 1743    Narrative:      Indication for culture:->Evaluation for sepsis without a  clear source of infection    BLOOD CULTURE [[id number]]  (Abnormal) Collected: 10/19/20 0145    Order Status: Completed Specimen: Blood from Peripheral Updated: 10/21/20 1241     Significant Indicator POS     Source BLD     Site PERIPHERAL     Culture Result Growth detected by Bactec instrument. 10/20/2020  06:44      Staphylococcus haemolyticus  See previous culture for sensitivity report.      Narrative:      CALL  Denise  MS5 tel. [phone],  CALLED  MS5 tel. [phone] 10/20/2020, 06:49, RB PERF. RESULTS CALLED  TO:[phone] RN  Per Hospital Policy: Only change Specimen Src: to \"Line\" if  specified by physician order.  Left AC    BLOOD CULTURE [[id number]]  (Abnormal)  (Susceptibility) Collected: 10/19/20 0300    Order Status: Completed Specimen: Blood from Peripheral Updated: 10/21/20 1240     " "Significant Indicator POS     Source BLD     Site PERIPHERAL     Culture Result Growth detected by Bactec instrument. 10/19/2020  21:47   CORRECTED REPORT  Correct result:  Negative for Staphylococcus aureus and MRSA by PCR. Correlate  ongoing need for antibiotics with clinical condition.  Erroneous result:Staphylococcus aureus (methicillin  sensitive)  detected by PCR.        Staphylococcus haemolyticus  This isolate is presumed to be clindamycin resistant based on  detection of inducible resistance.  Clindamycin may still  be effective in some patients.      Narrative:      CALL  Denise  MS5 tel. [phone],  CALLED  MS5 tel. [phone] 10/20/2020, 07:44, RB PERF. RESULTS CALLED  TO:[phone] and Cindy pharm  Per Hospital Policy: Only change Specimen Src: to \"Line\" if  specified by physician order.  No site indicated    Susceptibility     Staphylococcus haemolyticus (1)     Antibiotic Interpretation Microscan Method Status    Azithromycin Resistant >4 mcg/mL SAUL Final    Clindamycin Resistant <=0.5 mcg/mL SAUL Final    Cefazolin Resistant <=8 mcg/mL SAUL Final    Daptomycin Sensitive <=1 mcg/mL SAUL Final    Ampicillin/sulbactam Resistant <=8/4 mcg/mL SAUL Final    Erythromycin Resistant >4 mcg/mL SAUL Final    Vancomycin Sensitive 1 mcg/mL SAUL Final    Oxacillin Resistant >2 mcg/mL SAUL Final    Penicillin Resistant 8 mcg/mL SAUL Final    Trimeth/Sulfa Sensitive <=0.5/9.5 mcg/mL SAUL Final    Tetracycline Resistant >8 mcg/mL SAUL Final                   BLOOD CULTURE [[id number]] Collected: 10/20/20 0854    Order Status: Completed Specimen: Blood from Peripheral Updated: 10/21/20 0712     Significant Indicator NEG     Source BLD     Site PERIPHERAL     Culture Result No Growth  Note: Blood cultures are incubated for 5 days and  are monitored continuously.Positive blood cultures  are called to the RN and reported as soon as  they are identified.      Narrative:      Per Hospital Policy: Only change Specimen Src: to \"Line\" " "if  specified by physician order.  No site indicated    BLOOD CULTURE [[id number]] Collected: 10/20/20 1215    Order Status: Completed Specimen: Blood from Peripheral Updated: 10/21/20 0712     Significant Indicator NEG     Source BLD     Site PERIPHERAL     Culture Result No Growth  Note: Blood cultures are incubated for 5 days and  are monitored continuously.Positive blood cultures  are called to the RN and reported as soon as  they are identified.      Narrative:      Per Hospital Policy: Only change Specimen Src: to \"Line\" if  specified by physician order.  Right AC    MRSA By PCR (Amp) [[id number]] Collected: 10/20/20 0001    Order Status: Completed Specimen: Respirate from Nares Updated: 10/20/20 1550     Significant Indicator NEG     Source RESP     Site NARES     MRSA PCR Negative for MRSA by PCR.    Narrative:      Collected By:[id number] YUDY BAKER  Collected By:[id number] YUDY BAKER    URINALYSIS CULTURE, IF INDICATED [[id number]]  (Abnormal) Collected: 10/20/20 0923    Order Status: Completed Specimen: Urine, Clean Catch Updated: 10/20/20 1037     Color Karla     Character Clear     Specific Gravity 1.025     Ph 5.5     Glucose Negative mg/dL      Ketones Trace mg/dL      Protein Negative mg/dL      Bilirubin Moderate     Urobilinogen, Urine 0.2     Nitrite Positive     Leukocyte Esterase Negative     Occult Blood Moderate     Micro Urine Req Microscopic    COVID/SARS CoV-2 PCR [[id number]] Collected: 10/20/20 0001    Order Status: Completed Specimen: Respirate from Nasopharyngeal Updated: 10/20/20 0011     COVID Order Status Received     Comment: The order for SARS CoV-2 testing has been received by the  Laboratory. This result is neither positive nor negative.  Final results of testing will report in 24-48 hours, separately.         Narrative:      Is patient being admitted?->Yes  Does this patient meet criteria for Rush/Cepheid per [facility]  Inpatient Workflow? (See workflow link below)->No  Expected turn " around time?->Routine (In-House PCR up to 24  hours)  Is this the patients First SARS CoV-2 test?->Unknown  Is this patient employed in healthcare?->No  Is the patient symptomatic as defined by the CDC?->No  Is the patient hospitalized?->Yes  Is the patient in the ICU?->No  Is the patient a resident in a congregate care setting?->No  Is the patient pregnant?->No    GRAM STAIN [[id number]] Collected: 10/19/20 1140    Order Status: Completed Specimen: Body Fluid Updated: 10/19/20 1658     Significant Indicator .     Source BF     Site Ascites Fluid     Gram Stain Result Rare WBCs.  No organisms seen.      Narrative:      Peritoneal Fluid    SARS-CoV-2, PCR (In-House) [[id number]] Collected: 10/19/20 0145    Order Status: Completed Updated: 10/19/20 1259     SARS-CoV-2 Source NP Swab     SARS-CoV-2 by PCR NotDetected     Comment: [facility] providers: PLEASE REFER TO DE-ESCALATION AND RETESTING PROTOCOL  on [email or web address]  **The TaqPath COVID-19 SARS-CoV-2 test has been made available for use under  the Emergency Use Authorization (EUA) only.         Narrative:      Is patient being admitted?->Yes  Does this patient meet criteria for Rush/Cepheid per [facility]  Inpatient Workflow? (See workflow link below)->No  Expected turn around time?->Routine (In-House PCR up to 24  hours)  Is this the patients First SARS CoV-2 test?->Unknown  Is this patient employed in healthcare?->No  Is the patient symptomatic as defined by the CDC?->No  Is the patient hospitalized?->Yes  Is the patient in the ICU?->No  Is the patient a resident in a congregate care setting?->No  Is the patient pregnant?->No    COVID/SARS CoV-2 PCR [[id number]] Collected: 10/19/20 0145    Order Status: Completed Specimen: Respirate from Nasopharyngeal Updated: 10/19/20 0156     COVID Order Status Received     Comment: The order for SARS CoV-2 testing has been received by the  Laboratory. This result is neither positive nor negative.  Final results of testing will  report in 24-48 hours, separately.         Narrative:      Is patient being admitted?->Yes  Does this patient meet criteria for Rush/Cepheid per [facility]  Inpatient Workflow? (See workflow link below)->No  Expected turn around time?->Routine (In-House PCR up to 24  hours)  Is this the patients First SARS CoV-2 test?->Unknown  Is this patient employed in healthcare?->No  Is the patient symptomatic as defined by the CDC?->No  Is the patient hospitalized?->Yes  Is the patient in the ICU?->No  Is the patient a resident in a congregate care setting?->No  Is the patient pregnant?->No    BLOOD CULTURE (Child) [[id number]] Collected: 10/19/20 0127    Order Status: Canceled Specimen: Other from Peripheral                 Imaging  US-RENAL   Final Result      1.  Normal appearance of the kidneys      2.  Minimal post void residual of 13 mL      3.  However, with a bladder volume of only 66 mL she had the urge to void which is abnormal      EC-ECHOCARDIOGRAM COMPLETE W/O CONT   Final Result      US-PARACENTESIS, ABD WITH IMAGING   Final Result      1. Ultrasound-guided diagnostic paracentesis of the right lower quadrant of the abdominal wall.      2. 1000 mL of fluid withdrawn.      CT-ABDOMEN-PELVIS WITH   Final Result         1.  Small bowel mucosal thickening, appearance suggests enteritis.   2.  Changes of cirrhosis with massive abdominal ascites   3.  Esophageal and gastric varices   4.  Left inguinal hernia containing ascites   5.  Umbilical hernia containing ascites   6.  Splenomegaly      DX-CHEST-PORTABLE (1 VIEW)   Final Result         1.  No acute cardiopulmonary disease.   2.  Perihilar interstitial prominence and bronchial wall cuffing, appearance suggests changes of underlying bronchial inflammation, consider bronchitis.           Assessment/Plan  * AROLDO (acute kidney injury) (HCC)  Assessment & Plan  Likely in the setting of vanc (vanc trough > 40) +/- lasix, paracentesis (removed 4L) and liver disease  CT  abdomen/pelvis with normal kidneys and ureters  Renal u/s normal  Still urinating   UA with positive nitrite, moderate occult blood, negative protein, ucx pending, denies dysuria, ucx neg  Nephrology following: bicarb gtt 10/23, dc 10/24, albumin/midodrine challenge 10/26  Cr worsening 3.77-->4.19-->4.27        Bacteremia  Assessment & Plan  2/2 blood cx growing CoN staph: staph haemolyticus  Source? No hardware, denies back pain or joint pain, abdominal pain on admission s/p para, ascitic cx NGTD  vanc-->ancef -->vanc-->daptomycin-->linezolid 10/23-10/26  Repeat bcx NGTD  TTE normal    Hyponatremia  Assessment & Plan  Likely in setting of AROLDO  Limit hypotonic fluids  CTM    Pancytopenia (HCC)  Assessment & Plan  Likely in setting of cirrhosis, no neutropenia  H/H and plt dropped after procedure, but vital signs normal and no obvious bleeding  CTM    Enteritis- (present on admission)  Assessment & Plan  CT abd w/ Small bowel mucosal thickening, appearance suggests enteritis.  Pt with pinching abd pain but otherwise denies further Sx at this time  Abdominal pain resolved with paracentesis       Ascites- (present on admission)  Assessment & Plan  Pt with worsening RLQ abdominal pain and distention for the past few months  No fevers, chills, N/V, diarrhea, or constipation  CT abd w/ possible enteritis and massive ascites as well as gastric/esophageal varices  - therapeutic/diagnostic paracentesis on admission  - ascitic fluid , cx NG  - holding Propranolol, lasix, spironolactone  - Alcohol cessation counseling  - needs GI outpatient follow up  - may need repeat paracentesis before discharge given worsening ascites    Alcohol abuse- (present on admission)  Assessment & Plan  Pt with alcohol cirrhosis  Denies routine drinking, but admits he drank 2-3 shots on day of admission  Alcohol level 366  - s/p CIWA monitoring  - Multivitamin and RALLY Bag  - Thiamine 1mg qd x4 doses and Folic Acid 1mg qd x4 doses  -  Seizure/Fall precautions  - Alcohol cessation counseling      VTE: SCDs       VTE prophylaxis: SCDs (d/t thrombocytopenia), ambulatory

## 2020-10-26 NOTE — PROGRESS NOTES
Nephrology Daily Progress Note    Date of Service  10/26/2020    Chief Complaint  45 y.o. male with liver cirrhosis,admitted 10/19/2020 with abdominal pain worsening ascites, staph bacteremia with hospital course complicated with AROLDO    Interval Problem Update  10/24 -doing well, no complaints  No abdominal pain, no N/V  Acidosis corrected  Creat level improving  10/25 - no complaints  No abdominal pain, no N/V  Creat worse 3.77-4.19  10/26 -urine output 695 mL in the last 24 hours.  Patient denies symptoms such as headache, nausea, vomiting, loss of appetite.  Denies chest pain, shortness of breath.      Review of Systems  Review of Systems   Constitutional: Negative for fever.   Respiratory: Negative for shortness of breath.    Cardiovascular: Negative for chest pain.   Gastrointestinal: Negative for abdominal pain.   All other systems reviewed and are negative.       Physical Exam  Temp:  [36.1 °C (97 °F)-37.2 °C (99 °F)] 36.1 °C (97 °F)  Pulse:  [76-87] 84  Resp:  [16-17] 16  BP: ()/(54-73) 104/56  SpO2:  [95 %-100 %] 95 %    Physical Exam  Constitutional:       General: He is not in acute distress.     Appearance: Normal appearance.   HENT:      Head: Normocephalic and atraumatic.      Nose: Nose normal. No rhinorrhea.      Mouth/Throat:      Mouth: Mucous membranes are moist.      Pharynx: Oropharynx is clear.   Eyes:      General: No scleral icterus.     Extraocular Movements: Extraocular movements intact.      Conjunctiva/sclera: Conjunctivae normal.   Neck:      Musculoskeletal: Normal range of motion and neck supple.   Cardiovascular:      Rate and Rhythm: Normal rate and regular rhythm.      Heart sounds: No murmur.   Pulmonary:      Effort: Pulmonary effort is normal.      Breath sounds: Normal breath sounds. No rales.   Abdominal:      General: Abdomen is flat. There is distension.      Palpations: Abdomen is soft.      Tenderness: There is no abdominal tenderness.   Musculoskeletal:      Right  lower leg: Edema present.      Left lower leg: Edema present.   Skin:     General: Skin is warm and dry.   Neurological:      General: No focal deficit present.      Mental Status: He is alert and oriented to person, place, and time. Mental status is at baseline.   Psychiatric:         Mood and Affect: Mood normal.         Behavior: Behavior normal.         Fluids    Intake/Output Summary (Last 24 hours) at 10/26/2020 1431  Last data filed at 10/26/2020 1300  Gross per 24 hour   Intake 1050 ml   Output 680 ml   Net 370 ml       Laboratory  Recent Labs     10/24/20  0444 10/26/20  0037   WBC 3.5* 3.1*   RBC 3.45* 3.21*   HEMOGLOBIN 9.2* 8.5*   HEMATOCRIT 28.1* 26.0*   MCV 81.4 81.0*   MCH 26.7* 26.5*   MCHC 32.7* 32.7*   RDW 55.3* 56.5*   PLATELETCT 51* 54*   MPV 10.9 10.3     Recent Labs     10/24/20  0444 10/25/20  0314 10/26/20  0037   SODIUM 129* 128* 127*   POTASSIUM 3.7 3.3* 3.3*   CHLORIDE 96 94* 95*   CO2 23 23 20   GLUCOSE 117* 91 127*   BUN 34* 38* 41*   CREATININE 3.77* 4.19* 4.27*   CALCIUM 7.9* 8.0* 7.7*         No results for input(s): NTPROBNP in the last 72 hours.        Imaging  Renal US reviewed    Assessment/Plan  1. acute kidney injury, nonoliguric, worsening.  No history of CKD.  Baseline creatinine 0.5 on 10/19/2020.  Unclear etiology, but concern for HRS.  Recommend albumin 5% 1 g/kg/day over the next 48 hours.  No acute need for dialysis.  Check labs daily.  Avoid nephrotoxins.    2.  Alcoholic liver disease.  I encouraged 100% cessation and abstinence from alcohol.  Further management per primary team.    3.  Hyponatremia, worsening.  Limit hypotonic fluids.  Check labs daily.    4.  Hypokalemia, persistent, not improving.  Recommend replete potassium per primary team.  Check labs daily.    5.  Normocytic anemia, worsening.  Unclear etiology.  Check CBC daily.    6.  Leukocytopenia, worsening.  Unclear etiology.  Check CBC daily.    7.  Thrombocytopenia, persistent.  Likely due to underlying  liver disease.  Recommend check CBC daily.    Discussed with Dr. Ashley Carranza MD  Nephrology

## 2020-10-26 NOTE — CARE PLAN
Problem: Bowel/Gastric:  Goal: Normal bowel function is maintained or improved  Outcome: PROGRESSING AS EXPECTED  Note: Pt has a normal elimination pattern.      Problem: Knowledge Deficit  Goal: Knowledge of disease process/condition, treatment plan, diagnostic tests, and medications will improve  Outcome: PROGRESSING AS EXPECTED  Note: Pt is aware of treatment plan. RN discussed plan for this evening, pt showed understanding.

## 2020-10-26 NOTE — PROGRESS NOTES
Report received from day shift RN. Assumed care at 1900, assessment complete. Pt is A & O x 4.   Pt is resting in bed. Pt denies having any pain at this time. Fall precautions and appropriate signs in place. Pt oriented to unit routine, call light/phone system and RN extension number provided. Pt educated regarding fall precautions. . Pt denies any additional needs at this time. Call light within reach.

## 2020-10-26 NOTE — PROGRESS NOTES
Assumed care of patient at 0700. Received bedside report from noc RN. Patient resting comfortably in bed sleeping. No signs of distress. Bed is in low and locked position. Non-slip socks are in place. Call light is within reach.

## 2020-10-26 NOTE — CARE PLAN
Problem: Communication  Goal: The ability to communicate needs accurately and effectively will improve  Outcome: PROGRESSING AS EXPECTED  Note: Educated patient on the use of call light for assistance. Went over controls and functions on the remote. Answered any questions patient had. Patient has been calling appropriately.      Problem: Knowledge Deficit  Goal: Knowledge of disease process/condition, treatment plan, diagnostic tests, and medications will improve  Outcome: PROGRESSING AS EXPECTED  Note: Went over plan of care with patient and answered any questions patient had.

## 2020-10-27 ENCOUNTER — APPOINTMENT (OUTPATIENT)
Dept: RADIOLOGY | Facility: MEDICAL CENTER | Age: 45
DRG: 432 | End: 2020-10-27
Attending: INTERNAL MEDICINE
Payer: COMMERCIAL

## 2020-10-27 PROBLEM — N17.0 ACUTE RENAL FAILURE WITH TUBULAR NECROSIS (HCC): Status: ACTIVE | Noted: 2020-10-22

## 2020-10-27 PROBLEM — K52.9 ENTERITIS: Status: RESOLVED | Noted: 2020-10-19 | Resolved: 2020-10-27

## 2020-10-27 LAB
ANION GAP SERPL CALC-SCNC: 11 MMOL/L (ref 7–16)
BASOPHILS # BLD AUTO: 0.7 % (ref 0–1.8)
BASOPHILS # BLD: 0.02 K/UL (ref 0–0.12)
BUN SERPL-MCNC: 44 MG/DL (ref 8–22)
CALCIUM SERPL-MCNC: 8.3 MG/DL (ref 8.5–10.5)
CHLORIDE SERPL-SCNC: 101 MMOL/L (ref 96–112)
CO2 SERPL-SCNC: 21 MMOL/L (ref 20–33)
CREAT SERPL-MCNC: 3.98 MG/DL (ref 0.5–1.4)
EOSINOPHIL # BLD AUTO: 0.05 K/UL (ref 0–0.51)
EOSINOPHIL NFR BLD: 1.9 % (ref 0–6.9)
ERYTHROCYTE [DISTWIDTH] IN BLOOD BY AUTOMATED COUNT: 58.4 FL (ref 35.9–50)
GLUCOSE SERPL-MCNC: 138 MG/DL (ref 65–99)
HCT VFR BLD AUTO: 26.3 % (ref 42–52)
HGB BLD-MCNC: 8.6 G/DL (ref 14–18)
IMM GRANULOCYTES # BLD AUTO: 0.01 K/UL (ref 0–0.11)
IMM GRANULOCYTES NFR BLD AUTO: 0.4 % (ref 0–0.9)
INR PPP: 1.6 (ref 0.87–1.13)
LYMPHOCYTES # BLD AUTO: 0.53 K/UL (ref 1–4.8)
LYMPHOCYTES NFR BLD: 19.6 % (ref 22–41)
MAGNESIUM SERPL-MCNC: 2.2 MG/DL (ref 1.5–2.5)
MCH RBC QN AUTO: 27 PG (ref 27–33)
MCHC RBC AUTO-ENTMCNC: 32.7 G/DL (ref 33.7–35.3)
MCV RBC AUTO: 82.7 FL (ref 81.4–97.8)
MONOCYTES # BLD AUTO: 0.49 K/UL (ref 0–0.85)
MONOCYTES NFR BLD AUTO: 18.1 % (ref 0–13.4)
NEUTROPHILS # BLD AUTO: 1.6 K/UL (ref 1.82–7.42)
NEUTROPHILS NFR BLD: 59.3 % (ref 44–72)
NRBC # BLD AUTO: 0 K/UL
NRBC BLD-RTO: 0 /100 WBC
PHOSPHATE SERPL-MCNC: 4.2 MG/DL (ref 2.5–4.5)
PLATELET # BLD AUTO: 56 K/UL (ref 164–446)
PMV BLD AUTO: 9.7 FL (ref 9–12.9)
POTASSIUM SERPL-SCNC: 3.7 MMOL/L (ref 3.6–5.5)
PROTHROMBIN TIME: 19.5 SEC (ref 12–14.6)
RBC # BLD AUTO: 3.18 M/UL (ref 4.7–6.1)
SODIUM SERPL-SCNC: 133 MMOL/L (ref 135–145)
WBC # BLD AUTO: 2.7 K/UL (ref 4.8–10.8)

## 2020-10-27 PROCEDURE — 84100 ASSAY OF PHOSPHORUS: CPT

## 2020-10-27 PROCEDURE — 700102 HCHG RX REV CODE 250 W/ 637 OVERRIDE(OP): Performed by: STUDENT IN AN ORGANIZED HEALTH CARE EDUCATION/TRAINING PROGRAM

## 2020-10-27 PROCEDURE — 700102 HCHG RX REV CODE 250 W/ 637 OVERRIDE(OP): Performed by: INTERNAL MEDICINE

## 2020-10-27 PROCEDURE — 36415 COLL VENOUS BLD VENIPUNCTURE: CPT

## 2020-10-27 PROCEDURE — P9045 ALBUMIN (HUMAN), 5%, 250 ML: HCPCS | Performed by: INTERNAL MEDICINE

## 2020-10-27 PROCEDURE — A9270 NON-COVERED ITEM OR SERVICE: HCPCS | Performed by: STUDENT IN AN ORGANIZED HEALTH CARE EDUCATION/TRAINING PROGRAM

## 2020-10-27 PROCEDURE — 85025 COMPLETE CBC W/AUTO DIFF WBC: CPT

## 2020-10-27 PROCEDURE — 85610 PROTHROMBIN TIME: CPT

## 2020-10-27 PROCEDURE — 80048 BASIC METABOLIC PNL TOTAL CA: CPT

## 2020-10-27 PROCEDURE — 700111 HCHG RX REV CODE 636 W/ 250 OVERRIDE (IP): Performed by: INTERNAL MEDICINE

## 2020-10-27 PROCEDURE — 770006 HCHG ROOM/CARE - MED/SURG/GYN SEMI*

## 2020-10-27 PROCEDURE — 83735 ASSAY OF MAGNESIUM: CPT

## 2020-10-27 PROCEDURE — A9270 NON-COVERED ITEM OR SERVICE: HCPCS | Performed by: INTERNAL MEDICINE

## 2020-10-27 PROCEDURE — 99232 SBSQ HOSP IP/OBS MODERATE 35: CPT | Performed by: INTERNAL MEDICINE

## 2020-10-27 RX ADMIN — MIDODRINE HYDROCHLORIDE 5 MG: 5 TABLET ORAL at 08:14

## 2020-10-27 RX ADMIN — OMEPRAZOLE 40 MG: 20 CAPSULE, DELAYED RELEASE ORAL at 05:49

## 2020-10-27 RX ADMIN — ALBUMIN (HUMAN) 25 G: 2.5 SOLUTION INTRAVENOUS at 11:24

## 2020-10-27 RX ADMIN — ALBUMIN (HUMAN) 25 G: 2.5 SOLUTION INTRAVENOUS at 00:04

## 2020-10-27 RX ADMIN — ALBUMIN (HUMAN) 25 G: 2.5 SOLUTION INTRAVENOUS at 05:47

## 2020-10-27 RX ADMIN — Medication 400 MG: at 06:12

## 2020-10-27 RX ADMIN — LACTULOSE 30 ML: 20 SOLUTION ORAL at 05:49

## 2020-10-27 RX ADMIN — DOCUSATE SODIUM 50 MG AND SENNOSIDES 8.6 MG 2 TABLET: 8.6; 5 TABLET, FILM COATED ORAL at 16:34

## 2020-10-27 RX ADMIN — MIDODRINE HYDROCHLORIDE 5 MG: 5 TABLET ORAL at 11:24

## 2020-10-27 RX ADMIN — OMEPRAZOLE 40 MG: 20 CAPSULE, DELAYED RELEASE ORAL at 16:34

## 2020-10-27 RX ADMIN — Medication 400 MG: at 16:34

## 2020-10-27 RX ADMIN — THERA TABS 1 TABLET: TAB at 16:34

## 2020-10-27 RX ADMIN — ALBUMIN (HUMAN) 25 G: 2.5 SOLUTION INTRAVENOUS at 16:34

## 2020-10-27 RX ADMIN — MIDODRINE HYDROCHLORIDE 5 MG: 5 TABLET ORAL at 16:34

## 2020-10-27 ASSESSMENT — ENCOUNTER SYMPTOMS
DIZZINESS: 0
BACK PAIN: 0
HEADACHES: 0
WEIGHT LOSS: 0
WHEEZING: 0
BLURRED VISION: 0
EYE DISCHARGE: 0
SHORTNESS OF BREATH: 0
NERVOUS/ANXIOUS: 0
SPUTUM PRODUCTION: 0
SINUS PAIN: 0
WEAKNESS: 0
COUGH: 0
VOMITING: 0
DIAPHORESIS: 0
PALPITATIONS: 0
ABDOMINAL PAIN: 0
DIARRHEA: 0
HEARTBURN: 0
FEVER: 0
FLANK PAIN: 0
CLAUDICATION: 0
CONSTIPATION: 0
DEPRESSION: 0
INSOMNIA: 0
NAUSEA: 0

## 2020-10-27 ASSESSMENT — PAIN DESCRIPTION - PAIN TYPE: TYPE: ACUTE PAIN

## 2020-10-27 NOTE — CARE PLAN
Problem: Safety  Goal: Will remain free from falls  Outcome: PROGRESSING AS EXPECTED     Problem: Pain Management  Goal: Pain level will decrease to patient's comfort goal  Outcome: PROGRESSING AS EXPECTED     Problem: Fluid Volume:  Goal: Will maintain balanced intake and output  Outcome: PROGRESSING SLOWER THAN EXPECTED

## 2020-10-27 NOTE — PROGRESS NOTES
[facility] Medicine Daily Progress Note    Date of Service  10/27/2020    Chief Complaint  45 y.o. male admitted 10/19/2020 with abdominal distention.    Hospital Course    45M PMH anemia due to liver cirrhosis, EtOH use disorder, and prior GI bleed, pancreatitis presenting for abdominal pain.  Patient arrived on 10/19/2020 where he had severe abdominal pain, right lower quadrant, worsening progressively in the past 6 to 9 months, had paracentesis 9 months prior to this admission.  Patient had positive alcohol serum level on admission.  Patient was found to have severe ascites requiring paracentesis.  Patient's blood culture was positive 2 out of 2 sets on 10/19/2020 but was negative on repeat on 10/20/2020.  Patient was initially treated with vancomycin, Ancef, daptomycin and linezolid.  Patient subsequently developed acute renal failure and has been slowly recovering.      Interval Problem Update  I have seen and examined this patient this morning. Patient stated he was doing better today after the initial paracentesis.  He mentioned his urinary output is slowly improving.  Denied any pain, HA, abdominal pain.    As per nursing, no acute events overnight.    Last bowel movement yesterday.    Care plan discussed with patient in detail.  Care team notified of plan as well.    Consultants/Specialty  Nephrology  Infectious Disease    Code Status  Full Code    Disposition  D/C in 24 hours    Review of Systems  Review of Systems   Constitutional: Negative for diaphoresis, fever and weight loss.   HENT: Negative for hearing loss and sinus pain.    Eyes: Negative for blurred vision and discharge.   Respiratory: Negative for cough, sputum production, shortness of breath and wheezing.    Cardiovascular: Negative for chest pain, palpitations, claudication and leg swelling.   Gastrointestinal: Negative for abdominal pain, constipation, diarrhea, heartburn, nausea and vomiting.        Abdominal distention +   Genitourinary:  Negative for flank pain, frequency and urgency.   Musculoskeletal: Negative for back pain and joint pain.   Skin: Negative for itching and rash.   Neurological: Negative for dizziness, weakness and headaches.   Psychiatric/Behavioral: Negative for depression. The patient is not nervous/anxious and does not have insomnia.       Physical Exam  Temp:  [35.9 °C (96.7 °F)-36.6 °C (97.9 °F)] 35.9 °C (96.7 °F)  Pulse:  [73-76] 74  Resp:  [16-18] 18  BP: ()/(49-69) 93/49  SpO2:  [98 %-100 %] 100 %    Physical Exam  Vitals signs and nursing note reviewed.   Constitutional:       General: He is not in acute distress.     Appearance: Normal appearance. He is not diaphoretic.   HENT:      Head: Normocephalic and atraumatic.      Right Ear: External ear normal.      Left Ear: External ear normal.      Nose: No congestion.      Mouth/Throat:      Mouth: Mucous membranes are moist.      Pharynx: No oropharyngeal exudate or posterior oropharyngeal erythema.   Eyes:      General: No scleral icterus.     Extraocular Movements: Extraocular movements intact.      Conjunctiva/sclera: Conjunctivae normal.      Pupils: Pupils are equal, round, and reactive to light.   Neck:      Musculoskeletal: Normal range of motion and neck supple.   Cardiovascular:      Rate and Rhythm: Normal rate and regular rhythm.      Pulses: Normal pulses.      Heart sounds: Normal heart sounds. No murmur. No friction rub. No gallop.    Pulmonary:      Effort: Pulmonary effort is normal. No respiratory distress.      Breath sounds: Normal breath sounds. No wheezing.   Abdominal:      General: Bowel sounds are normal. There is distension (diffuse).      Tenderness: There is no abdominal tenderness.   Musculoskeletal: Normal range of motion.         General: No swelling, tenderness or deformity.   Skin:     General: Skin is warm.      Capillary Refill: Capillary refill takes less than 2 seconds.      Coloration: Skin is not jaundiced or pale.      Findings:  No rash.   Neurological:      General: No focal deficit present.      Mental Status: He is alert and oriented to person, place, and time. Mental status is at baseline.   Psychiatric:         Mood and Affect: Mood normal.         Behavior: Behavior normal.       Fluids    Intake/Output Summary (Last 24 hours) at 10/27/2020 1451  Last data filed at 10/27/2020 1300  Gross per 24 hour   Intake 1270 ml   Output 775 ml   Net 495 ml     Laboratory  Recent Labs     10/26/20  0037 10/27/20  0109   WBC 3.1* 2.7*   RBC 3.21* 3.18*   HEMOGLOBIN 8.5* 8.6*   HEMATOCRIT 26.0* 26.3*   MCV 81.0* 82.7   MCH 26.5* 27.0   MCHC 32.7* 32.7*   RDW 56.5* 58.4*   PLATELETCT 54* 56*   MPV 10.3 9.7     Recent Labs     10/25/20  0314 10/26/20  0037 10/27/20  0109   SODIUM 128* 127* 133*   POTASSIUM 3.3* 3.3* 3.7   CHLORIDE 94* 95* 101   CO2 23 20 21   GLUCOSE 91 127* 138*   BUN 38* 41* 44*   CREATININE 4.19* 4.27* 3.98*   CALCIUM 8.0* 7.7* 8.3*     Recent Labs     10/27/20  0109   INR 1.60*               Imaging  US-RENAL   Final Result      1.  Normal appearance of the kidneys      2.  Minimal post void residual of 13 mL      3.  However, with a bladder volume of only 66 mL she had the urge to void which is abnormal      EC-ECHOCARDIOGRAM COMPLETE W/O CONT   Final Result      US-PARACENTESIS, ABD WITH IMAGING   Final Result      1. Ultrasound-guided diagnostic paracentesis of the right lower quadrant of the abdominal wall.      2. 1000 mL of fluid withdrawn.      CT-ABDOMEN-PELVIS WITH   Final Result         1.  Small bowel mucosal thickening, appearance suggests enteritis.   2.  Changes of cirrhosis with massive abdominal ascites   3.  Esophageal and gastric varices   4.  Left inguinal hernia containing ascites   5.  Umbilical hernia containing ascites   6.  Splenomegaly      DX-CHEST-PORTABLE (1 VIEW)   Final Result         1.  No acute cardiopulmonary disease.   2.  Perihilar interstitial prominence and bronchial wall cuffing, appearance  suggests changes of underlying bronchial inflammation, consider bronchitis.      IR-PARACENTESIS, ABD WITH IMAGING    (Results Pending)        Assessment/Plan  * Acute renal failure with tubular necrosis (HCC)  Assessment & Plan  Likely due to vancomycin and large volume paracentesis  CT abdomen/pelvis with normal kidneys and ureters, Renal u/s normal.  UA with positive nitrite, moderate occult blood, negative protein, ucx pending, denies dysuria, ucx neg  Nephrology following: bicarb gtt 10/23, dc 10/24, albumin/midodrine challenge 10/26  Cr 3.77-->4.19-->4.27-->3.98  Improved quantity of urination, as per patient  -continue to monitor, avoid nephrotoxins    Bacteremia  Assessment & Plan  2/2 blood cx growing CoN staph: staph haemolyticus, TTE normal  No hardware, denies back pain or joint pain, abdominal pain on admission s/p para, ascitic cx NGTD  ABX therapy: Vanc-->ancef -->vanc-->daptomycin-->linezolid 10/23-10/26  Repeat bcx 10/20 NGTD  No futher treatment required, patient is not showing signs of sepsis or infection.    Hyponatremia  Assessment & Plan  Likely in setting of AROLDO  Limit hypotonic fluids  CTM    Pancytopenia (HCC)  Assessment & Plan  Likely in setting of cirrhosis, no neutropenia. Consider MDS if patient's labs worsen.  H/H and plt dropped after procedure, but vital signs normal and no obvious bleeding  CTM    Ascites- (present on admission)  Assessment & Plan  therapeutic/diagnostic paracentesis on admission  - ascitic fluid , cx NG  - holding Propranolol, lasix, spironolactone - BP is borderline low.  - continue Midodrine and albumin  - Alcohol cessation counseling  - needs GI outpatient follow up  - ordered repeat paracentesis before discharge    Alcohol abuse- (present on admission)  Assessment & Plan  Pt with alcohol cirrhosis  Denies routine drinking, but admits he drank 2-3 shots on day of admission  Alcohol level 366  - s/p CIWA monitoring  - Multivitamin and RALLY Bag  -  Seizure/Fall precautions  - Alcohol cessation counseling performed on this admission     VTE prophylaxis: Lovenox

## 2020-10-27 NOTE — PROGRESS NOTES
Received report from katarzyna RN and assumed care of pt. Pt currently ambulating in hallways. Denies any acute needs or concerns at this time. Aox4.    Call light within reach and fall precautions in place. Will monitor

## 2020-10-27 NOTE — PROGRESS NOTES
Assumed care at 0700. A/o x 4, OOB independently. Voiding in BR, strict I&O's. Pt in stable condition and resting comfortably. Safety maintained, call light within reach. monitoring labs/kidney function. See flow sheet for details.

## 2020-10-28 ENCOUNTER — APPOINTMENT (OUTPATIENT)
Dept: RADIOLOGY | Facility: MEDICAL CENTER | Age: 45
DRG: 432 | End: 2020-10-28
Attending: INTERNAL MEDICINE
Payer: COMMERCIAL

## 2020-10-28 LAB
ALBUMIN SERPL BCP-MCNC: 3.9 G/DL (ref 3.2–4.9)
ALBUMIN/GLOB SERPL: 1.2 G/DL
ALP SERPL-CCNC: 80 U/L (ref 30–99)
ALT SERPL-CCNC: 12 U/L (ref 2–50)
ANION GAP SERPL CALC-SCNC: 12 MMOL/L (ref 7–16)
AST SERPL-CCNC: 35 U/L (ref 12–45)
BILIRUB SERPL-MCNC: 1.5 MG/DL (ref 0.1–1.5)
BUN SERPL-MCNC: 43 MG/DL (ref 8–22)
CALCIUM SERPL-MCNC: 9.4 MG/DL (ref 8.5–10.5)
CHLORIDE SERPL-SCNC: 101 MMOL/L (ref 96–112)
CO2 SERPL-SCNC: 21 MMOL/L (ref 20–33)
CREAT SERPL-MCNC: 3.15 MG/DL (ref 0.5–1.4)
ERYTHROCYTE [DISTWIDTH] IN BLOOD BY AUTOMATED COUNT: 60.1 FL (ref 35.9–50)
GLOBULIN SER CALC-MCNC: 3.3 G/DL (ref 1.9–3.5)
GLUCOSE SERPL-MCNC: 115 MG/DL (ref 65–99)
HCT VFR BLD AUTO: 29.2 % (ref 42–52)
HGB BLD-MCNC: 9.4 G/DL (ref 14–18)
MAGNESIUM SERPL-MCNC: 2.3 MG/DL (ref 1.5–2.5)
MCH RBC QN AUTO: 27 PG (ref 27–33)
MCHC RBC AUTO-ENTMCNC: 32.2 G/DL (ref 33.7–35.3)
MCV RBC AUTO: 83.9 FL (ref 81.4–97.8)
PHOSPHATE SERPL-MCNC: 4.1 MG/DL (ref 2.5–4.5)
PLATELET # BLD AUTO: 68 K/UL (ref 164–446)
PMV BLD AUTO: 10.4 FL (ref 9–12.9)
POTASSIUM SERPL-SCNC: 4.4 MMOL/L (ref 3.6–5.5)
PROT SERPL-MCNC: 7.2 G/DL (ref 6–8.2)
RBC # BLD AUTO: 3.48 M/UL (ref 4.7–6.1)
SODIUM SERPL-SCNC: 134 MMOL/L (ref 135–145)
WBC # BLD AUTO: 2.6 K/UL (ref 4.8–10.8)

## 2020-10-28 PROCEDURE — 700111 HCHG RX REV CODE 636 W/ 250 OVERRIDE (IP): Performed by: INTERNAL MEDICINE

## 2020-10-28 PROCEDURE — A9270 NON-COVERED ITEM OR SERVICE: HCPCS | Performed by: INTERNAL MEDICINE

## 2020-10-28 PROCEDURE — 84100 ASSAY OF PHOSPHORUS: CPT

## 2020-10-28 PROCEDURE — 700102 HCHG RX REV CODE 250 W/ 637 OVERRIDE(OP): Performed by: NURSE PRACTITIONER

## 2020-10-28 PROCEDURE — 700102 HCHG RX REV CODE 250 W/ 637 OVERRIDE(OP): Performed by: STUDENT IN AN ORGANIZED HEALTH CARE EDUCATION/TRAINING PROGRAM

## 2020-10-28 PROCEDURE — 49083 ABD PARACENTESIS W/IMAGING: CPT

## 2020-10-28 PROCEDURE — 99232 SBSQ HOSP IP/OBS MODERATE 35: CPT | Performed by: INTERNAL MEDICINE

## 2020-10-28 PROCEDURE — 700102 HCHG RX REV CODE 250 W/ 637 OVERRIDE(OP): Performed by: INTERNAL MEDICINE

## 2020-10-28 PROCEDURE — A9270 NON-COVERED ITEM OR SERVICE: HCPCS | Performed by: STUDENT IN AN ORGANIZED HEALTH CARE EDUCATION/TRAINING PROGRAM

## 2020-10-28 PROCEDURE — 770006 HCHG ROOM/CARE - MED/SURG/GYN SEMI*

## 2020-10-28 PROCEDURE — 36415 COLL VENOUS BLD VENIPUNCTURE: CPT

## 2020-10-28 PROCEDURE — 80053 COMPREHEN METABOLIC PANEL: CPT

## 2020-10-28 PROCEDURE — 85027 COMPLETE CBC AUTOMATED: CPT

## 2020-10-28 PROCEDURE — A9270 NON-COVERED ITEM OR SERVICE: HCPCS | Performed by: NURSE PRACTITIONER

## 2020-10-28 PROCEDURE — 0W9G3ZZ DRAINAGE OF PERITONEAL CAVITY, PERCUTANEOUS APPROACH: ICD-10-PCS | Performed by: RADIOLOGY

## 2020-10-28 PROCEDURE — P9047 ALBUMIN (HUMAN), 25%, 50ML: HCPCS | Performed by: INTERNAL MEDICINE

## 2020-10-28 PROCEDURE — 83735 ASSAY OF MAGNESIUM: CPT

## 2020-10-28 PROCEDURE — P9045 ALBUMIN (HUMAN), 5%, 250 ML: HCPCS | Performed by: INTERNAL MEDICINE

## 2020-10-28 RX ORDER — ZOLPIDEM TARTRATE 5 MG/1
5 TABLET ORAL ONCE
Status: COMPLETED | OUTPATIENT
Start: 2020-10-28 | End: 2020-10-28

## 2020-10-28 RX ORDER — ALBUMIN (HUMAN) 12.5 G/50ML
75 SOLUTION INTRAVENOUS ONCE
Status: COMPLETED | OUTPATIENT
Start: 2020-10-28 | End: 2020-10-28

## 2020-10-28 RX ADMIN — MIDODRINE HYDROCHLORIDE 5 MG: 5 TABLET ORAL at 17:57

## 2020-10-28 RX ADMIN — OMEPRAZOLE 40 MG: 20 CAPSULE, DELAYED RELEASE ORAL at 05:48

## 2020-10-28 RX ADMIN — ZOLPIDEM TARTRATE 5 MG: 5 TABLET ORAL at 21:29

## 2020-10-28 RX ADMIN — OMEPRAZOLE 40 MG: 20 CAPSULE, DELAYED RELEASE ORAL at 17:57

## 2020-10-28 RX ADMIN — Medication 400 MG: at 05:48

## 2020-10-28 RX ADMIN — ALBUMIN (HUMAN) 25 G: 2.5 SOLUTION INTRAVENOUS at 00:18

## 2020-10-28 RX ADMIN — ALBUMIN (HUMAN) 75 G: 5 SOLUTION INTRAVENOUS at 16:06

## 2020-10-28 RX ADMIN — LACTULOSE 30 ML: 20 SOLUTION ORAL at 05:48

## 2020-10-28 RX ADMIN — Medication 400 MG: at 17:56

## 2020-10-28 RX ADMIN — MIDODRINE HYDROCHLORIDE 5 MG: 5 TABLET ORAL at 08:25

## 2020-10-28 RX ADMIN — MIDODRINE HYDROCHLORIDE 5 MG: 5 TABLET ORAL at 12:32

## 2020-10-28 RX ADMIN — THERA TABS 1 TABLET: TAB at 05:48

## 2020-10-28 ASSESSMENT — ENCOUNTER SYMPTOMS
PALPITATIONS: 0
ABDOMINAL PAIN: 0
EYE DISCHARGE: 0
BLURRED VISION: 0
DEPRESSION: 0
DIAPHORESIS: 0
WEAKNESS: 0
HEARTBURN: 0
HEADACHES: 0
WHEEZING: 0
CONSTIPATION: 0
INSOMNIA: 0
FLANK PAIN: 0
WEIGHT LOSS: 0
SPUTUM PRODUCTION: 0
CLAUDICATION: 0
COUGH: 0
NERVOUS/ANXIOUS: 0
BACK PAIN: 0
SINUS PAIN: 0
DIARRHEA: 0
VOMITING: 0
FEVER: 0
DIZZINESS: 0
NAUSEA: 0
SHORTNESS OF BREATH: 0

## 2020-10-28 NOTE — PROGRESS NOTES
Nephrology Daily Progress Note    Date of Service  10/28/2020    Chief Complaint  45 y.o. male with liver cirrhosis,admitted 10/19/2020 with abdominal pain worsening ascites, staph bacteremia with hospital course complicated with AROLDO    Interval Problem Update  10/24 -doing well, no complaints  No abdominal pain, no N/V  Acidosis corrected  Creat level improving  10/25 - no complaints  No abdominal pain, no N/V  Creat worse 3.77-4.19  10/26 -urine output 695 mL in the last 24 hours.  Patient denies symptoms such as headache, nausea, vomiting, loss of appetite.  Denies chest pain, shortness of breath.  10/27 -urine output 700 mL in last 24 hours patient denies uremic symptoms.  Patient denies chest pain, shortness of breath.  Hopeful to go home soon.  10/28 -says he is urinating well, only 670 mL recorded.  Denies chest pain, shortness breath, headache, nausea, vomiting, loss of appetite.      Review of Systems  Review of Systems   Constitutional: Negative for fever.   Respiratory: Negative for shortness of breath.    Cardiovascular: Negative for chest pain.   Gastrointestinal: Negative for abdominal pain.   All other systems reviewed and are negative.       Physical Exam  Temp:  [36.4 °C (97.5 °F)-37 °C (98.6 °F)] 37 °C (98.6 °F)  Pulse:  [72-83] 83  Resp:  [16-18] 16  BP: (108-125)/(59-79) 112/59  SpO2:  [99 %-100 %] 100 %    Physical Exam  Constitutional:       General: He is not in acute distress.     Appearance: Normal appearance.   HENT:      Head: Normocephalic and atraumatic.      Nose: Nose normal. No rhinorrhea.      Mouth/Throat:      Mouth: Mucous membranes are moist.      Pharynx: Oropharynx is clear.   Eyes:      General: No scleral icterus.     Extraocular Movements: Extraocular movements intact.      Conjunctiva/sclera: Conjunctivae normal.   Neck:      Musculoskeletal: Normal range of motion and neck supple.   Cardiovascular:      Rate and Rhythm: Normal rate and regular rhythm.      Heart sounds: No  murmur.   Pulmonary:      Effort: Pulmonary effort is normal.      Breath sounds: Normal breath sounds. No rales.   Abdominal:      General: Abdomen is flat. There is distension.      Palpations: Abdomen is soft.      Tenderness: There is no abdominal tenderness.   Musculoskeletal:      Right lower leg: No edema.      Left lower leg: No edema.   Skin:     General: Skin is warm and dry.   Neurological:      General: No focal deficit present.      Mental Status: He is alert and oriented to person, place, and time. Mental status is at baseline.   Psychiatric:         Mood and Affect: Mood normal.         Behavior: Behavior normal.         Fluids    Intake/Output Summary (Last 24 hours) at 10/28/2020 1637  Last data filed at 10/28/2020 1002  Gross per 24 hour   Intake 600 ml   Output 370 ml   Net 230 ml       Laboratory  Recent Labs     10/26/20  0037 10/27/20  0109 10/28/20  1005   WBC 3.1* 2.7* 2.6*   RBC 3.21* 3.18* 3.48*   HEMOGLOBIN 8.5* 8.6* 9.4*   HEMATOCRIT 26.0* 26.3* 29.2*   MCV 81.0* 82.7 83.9   MCH 26.5* 27.0 27.0   MCHC 32.7* 32.7* 32.2*   RDW 56.5* 58.4* 60.1*   PLATELETCT 54* 56* 68*   MPV 10.3 9.7 10.4     Recent Labs     10/26/20  0037 10/27/20  0109 10/28/20  1005   SODIUM 127* 133* 134*   POTASSIUM 3.3* 3.7 4.4   CHLORIDE 95* 101 101   CO2 20 21 21   GLUCOSE 127* 138* 115*   BUN 41* 44* 43*   CREATININE 4.27* 3.98* 3.15*   CALCIUM 7.7* 8.3* 9.4     Recent Labs     10/27/20  0109   INR 1.60*     No results for input(s): NTPROBNP in the last 72 hours.        Imaging  Renal US reviewed    Assessment/Plan  45 y.o. male with liver cirrhosis,admitted 10/19/2020 with abdominal pain worsening ascites, staph bacteremia with hospital course complicated with AROLDO    1. acute kidney injury, nonoliguric, improving.  No history of CKD.  AORLDO improved with volume repletion with 5% albumin.  No need for dialysis.  Avoid nephrotoxins.  Check labs daily.    2.  Alcoholic liver disease, complicated by ascites.  I  encouraged 100% cessation and abstinence from alcohol.  If patient gets large-volume paracentesis greater than 4 L, recommend albumin replacement of 25% albumin 6 to 8 g/L removed.  Further management per primary team.    3.  Hyponatremia, improving.  Limit hypotonic fluids.  Check labs daily.    4.  Hypokalemia, resolved.  Check labs daily.    5.  Normocytic anemia, improving.  Unclear etiology of anemia.  Check CBC daily.    6.  Leukocytopenia, worsening.  Unclear etiology.  Check CBC daily.    7.  Thrombocytopenia, persistent, but slightly improving.  Thrombocytopenia likely from liver disease.  Check CBC daily while inpatient.    Discussed with Dr. Augustus Carranza MD  Nephrology

## 2020-10-28 NOTE — PROGRESS NOTES
Patient AxO x 4. Up independently.  Respirations normal and unlabored. VSS.  Abdominal distention with hyperactive bowel sounds q4.   Fall risk protocol in place. Bed locked and in lowest position. Non-skid socks on.  Rounded on hourly. Call light with in reach.

## 2020-10-28 NOTE — PROGRESS NOTES
Assumed care at 0700, report received from NOC RN.  Pt A&O x 4, denies any pain. Pt has increased ascites and is anticipating a paracentesis today. Bed locked, 2 rails up, bed in lowest position. Call light in place, belongings at bedside, no needs at this time and hourly rounding in place.

## 2020-10-28 NOTE — PROGRESS NOTES
Nephrology Daily Progress Note    Date of Service  10/27/2020    Chief Complaint  45 y.o. male with liver cirrhosis,admitted 10/19/2020 with abdominal pain worsening ascites, staph bacteremia with hospital course complicated with AROLDO    Interval Problem Update  10/24 -doing well, no complaints  No abdominal pain, no N/V  Acidosis corrected  Creat level improving  10/25 - no complaints  No abdominal pain, no N/V  Creat worse 3.77-4.19  10/26 -urine output 695 mL in the last 24 hours.  Patient denies symptoms such as headache, nausea, vomiting, loss of appetite.  Denies chest pain, shortness of breath.  10/27 -urine output 700 mL in last 24 hours patient denies uremic symptoms.  Patient denies chest pain, shortness of breath.  Hopeful to go home soon.      Review of Systems  Review of Systems   Constitutional: Negative for fever.   Respiratory: Negative for shortness of breath.    Cardiovascular: Negative for chest pain.   Gastrointestinal: Negative for abdominal pain.   All other systems reviewed and are negative.       Physical Exam  Temp:  [35.9 °C (96.7 °F)-36.6 °C (97.9 °F)] 36.3 °C (97.3 °F)  Pulse:  [73-77] 77  Resp:  [16-18] 18  BP: ()/(49-72) 120/72  SpO2:  [98 %-100 %] 100 %    Physical Exam  Constitutional:       General: He is not in acute distress.     Appearance: Normal appearance.   HENT:      Head: Normocephalic and atraumatic.      Nose: Nose normal. No rhinorrhea.      Mouth/Throat:      Mouth: Mucous membranes are moist.      Pharynx: Oropharynx is clear.   Eyes:      General: No scleral icterus.     Extraocular Movements: Extraocular movements intact.      Conjunctiva/sclera: Conjunctivae normal.   Neck:      Musculoskeletal: Normal range of motion and neck supple.   Cardiovascular:      Rate and Rhythm: Normal rate and regular rhythm.      Heart sounds: No murmur.   Pulmonary:      Effort: Pulmonary effort is normal.      Breath sounds: Normal breath sounds. No rales.   Abdominal:       General: Abdomen is flat. There is distension.      Palpations: Abdomen is soft.      Tenderness: There is no abdominal tenderness.   Musculoskeletal:      Right lower leg: Edema present.      Left lower leg: Edema present.   Skin:     General: Skin is warm and dry.   Neurological:      General: No focal deficit present.      Mental Status: He is alert and oriented to person, place, and time. Mental status is at baseline.   Psychiatric:         Mood and Affect: Mood normal.         Behavior: Behavior normal.         Fluids    Intake/Output Summary (Last 24 hours) at 10/27/2020 1807  Last data filed at 10/27/2020 1300  Gross per 24 hour   Intake 430 ml   Output 550 ml   Net -120 ml       Laboratory  Recent Labs     10/26/20  0037 10/27/20  0109   WBC 3.1* 2.7*   RBC 3.21* 3.18*   HEMOGLOBIN 8.5* 8.6*   HEMATOCRIT 26.0* 26.3*   MCV 81.0* 82.7   MCH 26.5* 27.0   MCHC 32.7* 32.7*   RDW 56.5* 58.4*   PLATELETCT 54* 56*   MPV 10.3 9.7     Recent Labs     10/25/20  0314 10/26/20  0037 10/27/20  0109   SODIUM 128* 127* 133*   POTASSIUM 3.3* 3.3* 3.7   CHLORIDE 94* 95* 101   CO2 23 20 21   GLUCOSE 91 127* 138*   BUN 38* 41* 44*   CREATININE 4.19* 4.27* 3.98*   CALCIUM 8.0* 7.7* 8.3*     Recent Labs     10/27/20  0109   INR 1.60*     No results for input(s): NTPROBNP in the last 72 hours.        Imaging  Renal US reviewed    Assessment/Plan  1. acute kidney injury, nonoliguric, slightly improving.  No history of CKD.  Mildly improving with albumin 5%, suggesting volume depletion.  Continue day 2 of albumin 5% 1 g/kg/day.  No acute need for dialysis.  Check labs daily.  Avoid nephrotoxins.    2.  Alcoholic liver disease.  I encouraged 100% cessation and abstinence from alcohol.  Further management per primary team.    3.  Hyponatremia, improving.  Limit hypotonic fluids.  Check labs daily.    4.  Hypokalemia, improved.  Check labs daily.    5.  Normocytic anemia, persistent, but slightly improving.  Unclear etiology of anemia.   Check CBC daily.    6.  Leukocytopenia, worsening.  Unclear etiology.  Check CBC daily.    7.  Thrombocytopenia, persistent, but slightly improving.  Thrombocytopenia likely due to underlying liver disease.  Check CBC daily.      Kaleb Carranza MD  Nephrology

## 2020-10-28 NOTE — CARE PLAN
Problem: Communication  Goal: The ability to communicate needs accurately and effectively will improve  Outcome: PROGRESSING AS EXPECTED  Intervention: Educate patient and significant other/support system about the plan of care, procedures, treatments, medications and allow for questions  Flowsheets (Taken 10/28/2020 1631)  Pt & Family Have Been Educated on Methods Available to Report Concerns Related to Care, Treatment, Services, and Patient Safety Issues: Yes     Problem: Infection  Goal: Will remain free from infection  Outcome: PROGRESSING AS EXPECTED  Note: Pt educated on proper hand hygiene.

## 2020-10-28 NOTE — PROGRESS NOTES
The patient was transported to ultrasound for a therapeutic ultrasound guided paracentesis. Dr. Burr consented the patient and a time out was performed. Dr. Burr performed the procedure and vitals were monitored throughout. 9700 mL were removed and 0 mL were sent to the lab. The RN, Lexi, was updated. The patient tolerated the procedure and left in a stable condition.

## 2020-10-28 NOTE — PROGRESS NOTES
[facility] Medicine Daily Progress Note    Date of Service  10/28/2020    Chief Complaint  45 y.o. male admitted 10/19/2020 with abdominal distention.    Hospital Course    45M PMH anemia due to liver cirrhosis, EtOH use disorder, and prior GI bleed, pancreatitis presenting for abdominal pain.  Patient arrived on 10/19/2020 where he had severe abdominal pain, right lower quadrant, worsening progressively in the past 6 to 9 months, had paracentesis 9 months prior to this admission.  Patient had positive alcohol serum level on admission.  Patient was found to have severe ascites requiring paracentesis.  Patient's blood culture was positive 2 out of 2 sets on 10/19/2020 but was negative on repeat on 10/20/2020.  Patient was initially treated with vancomycin, Ancef, daptomycin and linezolid.  Patient subsequently developed acute renal failure and has been slowly recovering.      Interval Problem Update  I have seen and examined this patient this morning. Patient mentioned he was more distended today in his abdomen. Informed him that he will have paracentesis today.  He also mentioned he has increased urine output since yesterday.    As per nursing, no acute events overnight.    Last bowel movement yesterday.    Care plan discussed with patient in detail.  Care team notified of plan as well.    Consultants/Specialty  Nephrology  Infectious Disease  IR - paracentesis    Code Status  Full Code    Disposition  D/C in 24 hours    Review of Systems  Review of Systems   Constitutional: Negative for diaphoresis, fever and weight loss.   HENT: Negative for hearing loss and sinus pain.    Eyes: Negative for blurred vision and discharge.   Respiratory: Negative for cough, sputum production, shortness of breath and wheezing.    Cardiovascular: Negative for chest pain, palpitations, claudication and leg swelling.   Gastrointestinal: Negative for abdominal pain, constipation, diarrhea, heartburn, nausea and vomiting.        Abdominal  distention +   Genitourinary: Negative for flank pain, frequency and urgency.   Musculoskeletal: Negative for back pain and joint pain.   Skin: Negative for itching and rash.   Neurological: Negative for dizziness, weakness and headaches.   Psychiatric/Behavioral: Negative for depression. The patient is not nervous/anxious and does not have insomnia.       Physical Exam  Temp:  [36.3 °C (97.3 °F)-36.9 °C (98.5 °F)] 36.4 °C (97.5 °F)  Pulse:  [74-82] 82  Resp:  [16-18] 16  BP: (108-125)/(60-79) 111/60  SpO2:  [99 %-100 %] 99 %    Physical Exam  Vitals signs and nursing note reviewed.   Constitutional:       General: He is not in acute distress.     Appearance: Normal appearance. He is not diaphoretic.   HENT:      Head: Normocephalic and atraumatic.      Right Ear: External ear normal.      Left Ear: External ear normal.      Nose: No congestion.      Mouth/Throat:      Mouth: Mucous membranes are moist.      Pharynx: No oropharyngeal exudate or posterior oropharyngeal erythema.   Eyes:      General: No scleral icterus.     Extraocular Movements: Extraocular movements intact.      Conjunctiva/sclera: Conjunctivae normal.      Pupils: Pupils are equal, round, and reactive to light.   Neck:      Musculoskeletal: Normal range of motion and neck supple.   Cardiovascular:      Rate and Rhythm: Normal rate and regular rhythm.      Pulses: Normal pulses.      Heart sounds: Normal heart sounds. No murmur. No friction rub. No gallop.    Pulmonary:      Effort: Pulmonary effort is normal. No respiratory distress.      Breath sounds: Normal breath sounds. No wheezing.   Abdominal:      General: Bowel sounds are normal. There is distension (diffuse).      Tenderness: There is no abdominal tenderness.   Musculoskeletal: Normal range of motion.         General: No swelling, tenderness or deformity.   Skin:     General: Skin is warm.      Capillary Refill: Capillary refill takes less than 2 seconds.      Coloration: Skin is not  jaundiced or pale.      Findings: No rash.   Neurological:      General: No focal deficit present.      Mental Status: He is alert and oriented to person, place, and time. Mental status is at baseline.   Psychiatric:         Mood and Affect: Mood normal.         Behavior: Behavior normal.       Fluids    Intake/Output Summary (Last 24 hours) at 10/28/2020 1242  Last data filed at 10/28/2020 1002  Gross per 24 hour   Intake 600 ml   Output 470 ml   Net 130 ml     Laboratory  Recent Labs     10/26/20  0037 10/27/20  0109 10/28/20  1005   WBC 3.1* 2.7* 2.6*   RBC 3.21* 3.18* 3.48*   HEMOGLOBIN 8.5* 8.6* 9.4*   HEMATOCRIT 26.0* 26.3* 29.2*   MCV 81.0* 82.7 83.9   MCH 26.5* 27.0 27.0   MCHC 32.7* 32.7* 32.2*   RDW 56.5* 58.4* 60.1*   PLATELETCT 54* 56* 68*   MPV 10.3 9.7 10.4     Recent Labs     10/26/20  0037 10/27/20  0109 10/28/20  1005   SODIUM 127* 133* 134*   POTASSIUM 3.3* 3.7 4.4   CHLORIDE 95* 101 101   CO2 20 21 21   GLUCOSE 127* 138* 115*   BUN 41* 44* 43*   CREATININE 4.27* 3.98* 3.15*   CALCIUM 7.7* 8.3* 9.4     Recent Labs     10/27/20  0109   INR 1.60*               Imaging  US-RENAL   Final Result      1.  Normal appearance of the kidneys      2.  Minimal post void residual of 13 mL      3.  However, with a bladder volume of only 66 mL she had the urge to void which is abnormal      EC-ECHOCARDIOGRAM COMPLETE W/O CONT   Final Result      US-PARACENTESIS, ABD WITH IMAGING   Final Result      1. Ultrasound-guided diagnostic paracentesis of the right lower quadrant of the abdominal wall.      2. 1000 mL of fluid withdrawn.      CT-ABDOMEN-PELVIS WITH   Final Result         1.  Small bowel mucosal thickening, appearance suggests enteritis.   2.  Changes of cirrhosis with massive abdominal ascites   3.  Esophageal and gastric varices   4.  Left inguinal hernia containing ascites   5.  Umbilical hernia containing ascites   6.  Splenomegaly      DX-CHEST-PORTABLE (1 VIEW)   Final Result         1.  No acute  cardiopulmonary disease.   2.  Perihilar interstitial prominence and bronchial wall cuffing, appearance suggests changes of underlying bronchial inflammation, consider bronchitis.      US-PARACENTESIS, ABD WITH IMAGING    (Results Pending)        Assessment/Plan  * Acute renal failure with tubular necrosis (HCC)  Assessment & Plan  Likely due to vancomycin and large volume paracentesis  Imaging showing medical renal disease, otherwise normal.  Cr 3.77-->4.19-->4.27-->3.98-->3.15 (downtrending)  Improved quantity of urination, as per patient  -continue to monitor, avoid nephrotoxins    Hyponatremia  Assessment & Plan  Likely in setting of AROLDO  Limit hypotonic fluids  CTM    Pancytopenia (HCC)  Assessment & Plan  Likely in setting of cirrhosis, no neutropenia. Consider MDS if patient's labs worsen.  H/H and plt dropped after procedure, but vital signs normal and no obvious bleeding  CTM    Ascites- (present on admission)  Assessment & Plan  therapeutic/diagnostic paracentesis on admission  - ascitic fluid , cx NG  - holding Propranolol, lasix, spironolactone - BP is borderline low.  - continue Midodrine and albumin  - Alcohol cessation counseling  - needs GI outpatient follow up  - ordered repeat paracentesis before discharge    Bacteremia  Assessment & Plan  2/2 blood cx growing CoN staph: staph haemolyticus, TTE normal  No hardware, denies back pain or joint pain, abdominal pain on admission s/p para, ascitic cx NGTD  ABX therapy: Vanc-->ancef -->vanc-->daptomycin-->linezolid 10/23-10/26  Repeat bcx 10/20 NGTD  No futher treatment required, patient is not showing signs of sepsis or infection.    Alcohol abuse- (present on admission)  Assessment & Plan  Pt with alcohol cirrhosis  Denies routine drinking, but admits he drank 2-3 shots on day of admission  Alcohol level 366  - s/p CIWA monitoring  - Multivitamin and RALLY Bag  - Seizure/Fall precautions  - Alcohol cessation counseling performed on this  admission     VTE prophylaxis: Lovenox

## 2020-10-28 NOTE — PROGRESS NOTES
Received bedside report from offgoing Nurse, Raisa. Assumed care at 7723. Patient is sleeping. Pt on room air. Bed locked in lowest position. Call light within reach. Whiteboard updates with nurse's and CNA's numbers. Hourly rounding in place. Strict I/Os in place. Pt has planned paracentesis tomorrow.

## 2020-10-29 ENCOUNTER — PHARMACY VISIT (OUTPATIENT)
Dept: PHARMACY | Facility: MEDICAL CENTER | Age: 45
End: 2020-10-29
Payer: COMMERCIAL

## 2020-10-29 VITALS
DIASTOLIC BLOOD PRESSURE: 59 MMHG | RESPIRATION RATE: 16 BRPM | TEMPERATURE: 98.6 F | HEART RATE: 92 BPM | BODY MASS INDEX: 28.53 KG/M2 | HEIGHT: 68 IN | OXYGEN SATURATION: 100 % | WEIGHT: 188.27 LBS | SYSTOLIC BLOOD PRESSURE: 117 MMHG

## 2020-10-29 PROBLEM — E87.1 HYPONATREMIA: Status: RESOLVED | Noted: 2020-10-24 | Resolved: 2020-10-29

## 2020-10-29 LAB
ANION GAP SERPL CALC-SCNC: 12 MMOL/L (ref 7–16)
BUN SERPL-MCNC: 44 MG/DL (ref 8–22)
CALCIUM SERPL-MCNC: 8.9 MG/DL (ref 8.5–10.5)
CHLORIDE SERPL-SCNC: 103 MMOL/L (ref 96–112)
CO2 SERPL-SCNC: 18 MMOL/L (ref 20–33)
CREAT SERPL-MCNC: 2.53 MG/DL (ref 0.5–1.4)
ERYTHROCYTE [DISTWIDTH] IN BLOOD BY AUTOMATED COUNT: 57.8 FL (ref 35.9–50)
EST. AVERAGE GLUCOSE BLD GHB EST-MCNC: 97 MG/DL
GLUCOSE SERPL-MCNC: 128 MG/DL (ref 65–99)
HBA1C MFR BLD: 5 % (ref 0–5.6)
HCT VFR BLD AUTO: 24.3 % (ref 42–52)
HGB BLD-MCNC: 8 G/DL (ref 14–18)
MCH RBC QN AUTO: 27.2 PG (ref 27–33)
MCHC RBC AUTO-ENTMCNC: 32.9 G/DL (ref 33.7–35.3)
MCV RBC AUTO: 82.7 FL (ref 81.4–97.8)
MORPHOLOGY BLD-IMP: NORMAL
PLATELET # BLD AUTO: 55 K/UL (ref 164–446)
PLATELETS.RETICULATED NFR BLD AUTO: 1.7 K/UL (ref 0.6–13.1)
PMV BLD AUTO: 10 FL (ref 9–12.9)
POTASSIUM SERPL-SCNC: 3.6 MMOL/L (ref 3.6–5.5)
RBC # BLD AUTO: 2.94 M/UL (ref 4.7–6.1)
SODIUM SERPL-SCNC: 133 MMOL/L (ref 135–145)
WBC # BLD AUTO: 1.8 K/UL (ref 4.8–10.8)

## 2020-10-29 PROCEDURE — A9270 NON-COVERED ITEM OR SERVICE: HCPCS | Performed by: INTERNAL MEDICINE

## 2020-10-29 PROCEDURE — 700111 HCHG RX REV CODE 636 W/ 250 OVERRIDE (IP): Performed by: INTERNAL MEDICINE

## 2020-10-29 PROCEDURE — 80048 BASIC METABOLIC PNL TOTAL CA: CPT

## 2020-10-29 PROCEDURE — 700102 HCHG RX REV CODE 250 W/ 637 OVERRIDE(OP): Performed by: STUDENT IN AN ORGANIZED HEALTH CARE EDUCATION/TRAINING PROGRAM

## 2020-10-29 PROCEDURE — 85027 COMPLETE CBC AUTOMATED: CPT

## 2020-10-29 PROCEDURE — 99239 HOSP IP/OBS DSCHRG MGMT >30: CPT | Performed by: INTERNAL MEDICINE

## 2020-10-29 PROCEDURE — 85055 RETICULATED PLATELET ASSAY: CPT

## 2020-10-29 PROCEDURE — 700102 HCHG RX REV CODE 250 W/ 637 OVERRIDE(OP): Performed by: INTERNAL MEDICINE

## 2020-10-29 PROCEDURE — 83036 HEMOGLOBIN GLYCOSYLATED A1C: CPT

## 2020-10-29 PROCEDURE — RXMED WILLOW AMBULATORY MEDICATION CHARGE: Performed by: INTERNAL MEDICINE

## 2020-10-29 PROCEDURE — A9270 NON-COVERED ITEM OR SERVICE: HCPCS | Performed by: STUDENT IN AN ORGANIZED HEALTH CARE EDUCATION/TRAINING PROGRAM

## 2020-10-29 PROCEDURE — 36415 COLL VENOUS BLD VENIPUNCTURE: CPT

## 2020-10-29 RX ORDER — SPIRONOLACTONE 100 MG/1
100 TABLET, FILM COATED ORAL DAILY
Qty: 30 TAB | Refills: 3 | Status: SHIPPED | OUTPATIENT
Start: 2020-10-29 | End: 2023-01-18

## 2020-10-29 RX ORDER — MIDODRINE HYDROCHLORIDE 5 MG/1
5 TABLET ORAL
Qty: 90 TAB | Refills: 0 | Status: SHIPPED | OUTPATIENT
Start: 2020-10-29 | End: 2020-11-28

## 2020-10-29 RX ORDER — FUROSEMIDE 20 MG/1
60 TABLET ORAL DAILY
Qty: 90 TAB | Refills: 0 | Status: SHIPPED | OUTPATIENT
Start: 2020-10-29 | End: 2020-11-28

## 2020-10-29 RX ORDER — MIDODRINE HYDROCHLORIDE 5 MG/1
5 TABLET ORAL
Qty: 90 TAB | Refills: 0 | Status: SHIPPED | OUTPATIENT
Start: 2020-10-29 | End: 2020-10-29

## 2020-10-29 RX ORDER — PROPRANOLOL HYDROCHLORIDE 10 MG/1
10 TABLET ORAL EVERY 8 HOURS
Qty: 90 TAB | Refills: 11 | Status: ON HOLD | OUTPATIENT
Start: 2020-10-29 | End: 2023-01-20

## 2020-10-29 RX ORDER — PROPRANOLOL HYDROCHLORIDE 10 MG/1
10 TABLET ORAL EVERY 8 HOURS
Status: DISCONTINUED | OUTPATIENT
Start: 2020-10-29 | End: 2020-10-29 | Stop reason: HOSPADM

## 2020-10-29 RX ORDER — PROPRANOLOL HYDROCHLORIDE 10 MG/1
10 TABLET ORAL EVERY 8 HOURS
Qty: 90 TAB | Refills: 11 | Status: SHIPPED | OUTPATIENT
Start: 2020-10-29 | End: 2020-10-29 | Stop reason: SDUPTHER

## 2020-10-29 RX ORDER — OMEPRAZOLE 40 MG/1
40 CAPSULE, DELAYED RELEASE ORAL 2 TIMES DAILY
Qty: 60 CAP | Refills: 3 | Status: SHIPPED | OUTPATIENT
Start: 2020-10-29

## 2020-10-29 RX ORDER — MAGNESIUM OXIDE 400 MG/1
400 TABLET ORAL 2 TIMES DAILY
Qty: 30 TAB | Refills: 2 | Status: SHIPPED | OUTPATIENT
Start: 2020-10-29 | End: 2023-04-26

## 2020-10-29 RX ADMIN — Medication 400 MG: at 05:01

## 2020-10-29 RX ADMIN — ENOXAPARIN SODIUM 30 MG: 30 INJECTION SUBCUTANEOUS at 05:03

## 2020-10-29 RX ADMIN — THERA TABS 1 TABLET: TAB at 05:01

## 2020-10-29 RX ADMIN — PROPRANOLOL HYDROCHLORIDE 10 MG: 10 TABLET ORAL at 09:16

## 2020-10-29 RX ADMIN — MIDODRINE HYDROCHLORIDE 5 MG: 5 TABLET ORAL at 07:53

## 2020-10-29 RX ADMIN — OMEPRAZOLE 40 MG: 20 CAPSULE, DELAYED RELEASE ORAL at 05:01

## 2020-10-29 NOTE — PROGRESS NOTES
Bedside report received from Meg NAIK. Assumed care of pt at 0645 . Pt is awake at this time with equal chest rise and no signs of distress. Plan of care discussed with pt. Pt is A&O x 4. Pt is on not in use. Bed alarm is not in use, bed in lowest position, bed rails up x 2, belongings and call light within reach. Hourly rounding in place.

## 2020-10-29 NOTE — DISCHARGE SUMMARY
Discharge Summary    CHIEF COMPLAINT ON ADMISSION  Chief Complaint   Patient presents with   • Abdominal Pain       Reason for Admission  EMS     Admission Date  10/19/2020    CODE STATUS  Full Code    HPI & HOSPITAL COURSE     45M PMH anemia due to liver cirrhosis, EtOH use disorder, and prior GI bleed, pancreatitis presenting for abdominal pain.  Patient arrived on 10/19/2020 where he had severe abdominal pain, right lower quadrant, worsening progressively in the past 6 to 9 months, had paracentesis 9 months prior to this admission.  Patient had positive alcohol serum level on admission.  Patient was found to have severe ascites requiring paracentesis.  Patient's blood culture was positive 2 out of 2 sets on 10/19/2020 but was negative on repeat on 10/20/2020.  Patient was initially treated with vancomycin, Ancef, daptomycin and linezolid.  Patient subsequently developed acute renal failure and has been slowly recovering.     Patient completed short course in hospital of Linezolid, completed by 10/25 in regards with ID note.  Informed patient on WBC, platelets, recheck immediately with PCP labs, may be downtrending due to liver cirrhosis and linezolid. Recommendations listed below.    Repeat paracentesis performed prior to discharge. 9.7L removed, albumin 75g given.  Patient discharged with stable BP.  Spironolactone on hold until renal function recovers, recheck BMP with PCP. Recommended to continue Lasix and Midodrine at home, check BP at home.  Propranolol on hold due to hypotension after paracentesis, recommended to restart with PCP.    Patient's renal function is improving, along with UOP. Likely ATN, in recovery phase now. Creatinine levels improving, eGFR increasing. Recommended to refer for nephrology given liver cirrhosis and renal function decline.    Note given for work release to patient before discharge.    Therefore, he is discharged in fair and stable condition to home with close outpatient  follow-up.    The patient met 2-midnight criteria for an inpatient stay at the time of discharge.    Discharge Date  10/29/2020    FOLLOW UP ITEMS POST DISCHARGE  Listed below with PCP.    DISCHARGE DIAGNOSES  Principal Problem:    Acute renal failure with tubular necrosis (HCC) POA: No  Active Problems:    Ascites POA: Yes    Pancytopenia (HCC) POA: Yes    Alcohol abuse POA: Yes      Overview: Alc Abuse up to 1 month ago with 2 episodes for GI bleeding, working in       [facility] and in [place]            Urgent care appt 1 month ago to tell patient to stop drinking            Last Drink Dec 4 th                        Last Assessment & Plan:       Dear [PATIENT],            Due to your diagnosis of advanced liver disease, the Hepatology Team in       conjunction with The Liver Transplant Team at your Medical Center has made       the following recommendations for you, both for good health and to be       potentially considered for a liver transplant in the future.            The team recommendations are as follows:            1. If you are currently drinking alcohol, you must stop all alcohol use       immediately.  You must agree to never drink any alcohol, including       non-alcoholic drinks, or use any products with alcohol.  Alcohol is       contained in some over the counter products i.e.: cough syrup, mouthwash,       etc.  You must also agree to not use any illegal drugs, including       marijuana (even if marijuana has been prescribed).         2. In order to be considered for liver transplantation in the future, you       will need to attend Alcoholics Anonymous (AA) meetings twice a week and       keep logs of your attendance.  You will be asked to present these logs in       clinic. It these logs are not available for review in clinic, we will       presume that you have not attended AA.  If sessions are missed they may be       made up, however this must be documented.  You may have and evaluation at        any time, but once you have attended AA for approximately six months, with       documentation, your case can be discussed with your liver transplant       center for potential liver transplantation candidacy and listing.        However, attendance at AA will need to continue indefinitely, unless       otherwise determined by the Liver Transplant Team.       3. It is recommended that you have regular follow-up with your primary       care physician, including regular lab work. You should also continue to       have regular follow-up with our  Hepatology team, either in our central       office or in one of our outreach clinic sites.              It is your responsibility to make sure these items are completed.              Sincerely,            Hepatology Group and Liver Transplant Team            I have read and understand the above letter.            _______________________________________________________________________      Patient Signature         Bacteremia POA: Unknown  Resolved Problems:    Enteritis POA: Yes    Hyponatremia POA: Yes      FOLLOW UP  No future appointments.  [facility]  [address] [address] [phone]  Schedule an appointment as soon as possible for a visit  F/U liver cirrhosis, ascites. Needs repeat paracentesis.    Tomy Wilde M.D.  [address] [facility], [place] [address] [phone]    Schedule an appointment as soon as possible for a visit  F/U Bacteremia. Completed linezolid course as per ID. IF repeat paracentesis needed, limit 9L continue Midodrine for now, but restart Propranolol when BP stabilizes.    Tomy Wilde M.D.  [address] [address] [phone]    Schedule an appointment as soon as possible for a visit  Pancytopenia maybe due to linezolid & cirrhosis, consider MDS and referral to heme if no improvement in cell lines.    Tomy Wilde M.D.  [address]  [place] [address] [phone]    Schedule an  appointment as soon as possible for a visit  Check BMP, CBC, Phos. F/U Acute tubular necrosis that occurred after paracentesis + Vanco. F/U UOP, currently in recovery phase. Please refer to nephrology. Restart Spironolactone when Cr normalizes.      MEDICATIONS ON DISCHARGE     Medication List      START taking these medications      Instructions   magnesium oxide 400 MG Tabs tablet  Commonly known as: MAG-OX   Take 1 Tab by mouth 2 Times a Day.  Dose: 400 mg     midodrine 5 MG Tabs  Commonly known as: PROAMATINE   Take 1 Tab by mouth 3 times a day, with meals for 30 days. Use if SBP < 120.  Stop taking if resuming Propranolol.  Dose: 5 mg        CHANGE how you take these medications      Instructions   propranolol 10 MG Tabs  What changed: additional instructions  Commonly known as: INDERAL   Take 1 Tab by mouth every 8 hours. Hold for SBP <100  or DBP < 55  Dose: 10 mg        CONTINUE taking these medications      Instructions   furosemide 20 MG Tabs  Commonly known as: LASIX   Take 3 Tabs by mouth every day for 30 days.  Dose: 60 mg     lactulose 10 GM/15ML Soln   Take 20 g by mouth 2 times a day. 15-30 mL BID  Dose: 20 g     omeprazole 40 MG delayed-release capsule  Commonly known as: PRILOSEC   Take 1 Cap by mouth 2 Times a Day.  Dose: 40 mg     spironolactone 100 MG Tabs  Commonly known as: ALDACTONE   Take 1 Tab by mouth every day.  Dose: 100 mg            Allergies  No Known Allergies    DIET  Orders Placed This Encounter   Procedures   • Diet Order Regular     Standing Status:   Standing     Number of Occurrences:   1     Order Specific Question:   Diet:     Answer:   Regular [1]     Order Specific Question:   Electrolyte modifications:     Answer:   1.5 g Sodium [1]       ACTIVITY  As tolerated.  Weight bearing as tolerated    CONSULTATIONS  Nephrology  IR  ID    PROCEDURES  Paracentesis 10/19/20 and 10/28/20    LABORATORY  Lab Results   Component Value Date    SODIUM 133 (L) 10/29/2020    POTASSIUM 3.6  10/29/2020    CHLORIDE 103 10/29/2020    CO2 18 (L) 10/29/2020    GLUCOSE 128 (H) 10/29/2020    BUN 44 (H) 10/29/2020    CREATININE 2.53 (H) 10/29/2020        Lab Results   Component Value Date    WBC 1.8 (LL) 10/29/2020    HEMOGLOBIN 8.0 (L) 10/29/2020    HEMATOCRIT 24.3 (L) 10/29/2020    PLATELETCT 55 (L) 10/29/2020        Total time of the discharge process exceeds 40 minutes.  Included reviewed hospital course with patient, treatment goals upon discharge, recommendations to PCP, medications and adverse reactions, and nursing instructions for patient.

## 2020-10-29 NOTE — PROGRESS NOTES
Patient AxO x 4  Respirations normal and unlabored. VSS.  Fall risk protocol in place. Bed locked and in lowest position.  Non-skid socks on.  Rounded on hourly. Call light with in reach.

## 2020-10-29 NOTE — PROGRESS NOTES
Pt was given discharge instructions and meds to bed prescriptions. Pt IV d/c'd tip intact.  Pt verbalized understanding of instructions, new medication instructions and follow up.  Pt left with self in taxi.

## 2020-10-29 NOTE — DISCHARGE INSTRUCTIONS
Discharge Instructions    Discharged to home by taxi with self. Discharged via walking, hospital escort: Refused.  Special equipment needed: Not Applicable    Be sure to schedule a follow-up appointment with your primary care doctor or any specialists as instructed.     Discharge Plan:   Influenza Vaccine Indication: Patient Refuses    I understand that a diet low in cholesterol, fat, and sodium is recommended for good health. Unless I have been given specific instructions below for another diet, I accept this instruction as my diet prescription.   Other diet: sodium restriction.     Special Instructions: None    · Is patient discharged on Warfarin / Coumadin?   No     Depression / Suicide Risk    As you are discharged from this [facility] facility, it is important to learn how to keep safe from harming yourself.    Recognize the warning signs:  · Abrupt changes in personality, positive or negative- including increase in energy   · Giving away possessions  · Change in eating patterns- significant weight changes-  positive or negative  · Change in sleeping patterns- unable to sleep or sleeping all the time   · Unwillingness or inability to communicate  · Depression  · Unusual sadness, discouragement and loneliness  · Talk of wanting to die  · Neglect of personal appearance   · Rebelliousness- reckless behavior  · Withdrawal from people/activities they love  · Confusion- inability to concentrate     If you or a loved one observes any of these behaviors or has concerns about self-harm, here's what you can do:  · Talk about it- your feelings and reasons for harming yourself  · Remove any means that you might use to hurt yourself (examples: pills, rope, extension cords, firearm)  · Get professional help from the community (Mental Health, Substance Abuse, psychological counseling)  · Do not be alone:Call your Safe Contact- someone whom you trust who will be there for you.  · Call your local CRISIS HOTLINE 187-5738 or  251-804-1208  · Call your local Children's Mobile Crisis Response Team [place] (289) 532-1105 or www.[employer].Hoopla  · Call the toll free National Suicide Prevention Hotlines   · National Suicide Prevention Lifeline 530-152-UUSR (9089)  · National [place] Mailing Line Network 800-SUICIDE (621-5200)

## 2020-10-30 NOTE — DISCHARGE PLANNING
Medication reconcilliation completed. Medications delivered to patient at bedside. Patient counseled.       Jimbo Santacruz   Home Medication Instructions JOE:[id number]    Printed on:10/29/20 9065   Medication Information                      furosemide (LASIX) 20 MG Tab  Take 3 Tabs by mouth every day for 30 days.             magnesium oxide (MAG-OX) 400 MG Tab tablet  Take 1 Tab by mouth 2 Times a Day.             midodrine (PROAMATINE) 5 MG Tab  Take 1 Tab by mouth 3 times a day, with meals for 30 days. Use if SBP < 120.  Stop taking if resuming Propranolol.             omeprazole (PRILOSEC) 40 MG delayed-release capsule  Take 1 Cap by mouth 2 Times a Day.             propranolol (INDERAL) 10 MG Tab  Take 1 Tab by mouth every 8 hours. Hold for SBP <100  or DBP < 55             spironolactone (ALDACTONE) 100 MG Tab  Take 1 Tab by mouth every day.

## 2020-11-14 LAB — EKG IMPRESSION: NORMAL

## 2021-01-01 NOTE — ED NOTES
Report received from Masoud NAIK. Whiteboard updated. Purple top drawn and tubed to lab. NS bolus started.    2021 04:52

## 2023-01-18 ENCOUNTER — HOSPITAL ENCOUNTER (INPATIENT)
Facility: MEDICAL CENTER | Age: 48
LOS: 2 days | DRG: 442 | End: 2023-01-20
Attending: EMERGENCY MEDICINE | Admitting: HOSPITALIST
Payer: MEDICAID

## 2023-01-18 ENCOUNTER — APPOINTMENT (OUTPATIENT)
Dept: RADIOLOGY | Facility: MEDICAL CENTER | Age: 48
DRG: 442 | End: 2023-01-18
Attending: EMERGENCY MEDICINE
Payer: MEDICAID

## 2023-01-18 DIAGNOSIS — K92.2 GASTROINTESTINAL HEMORRHAGE, UNSPECIFIED GASTROINTESTINAL HEMORRHAGE TYPE: Primary | ICD-10-CM

## 2023-01-18 DIAGNOSIS — I85.11 SECONDARY ESOPHAGEAL VARICES WITH BLEEDING (HCC): ICD-10-CM

## 2023-01-18 DIAGNOSIS — E86.0 DEHYDRATION: ICD-10-CM

## 2023-01-18 DIAGNOSIS — F10.10 ALCOHOL ABUSE: ICD-10-CM

## 2023-01-18 DIAGNOSIS — R00.0 SINUS TACHYCARDIA: ICD-10-CM

## 2023-01-18 DIAGNOSIS — K74.60 DECOMPENSATED HEPATIC CIRRHOSIS (HCC): ICD-10-CM

## 2023-01-18 DIAGNOSIS — K70.31 ASCITES DUE TO ALCOHOLIC CIRRHOSIS (HCC): ICD-10-CM

## 2023-01-18 DIAGNOSIS — K72.90 DECOMPENSATED HEPATIC CIRRHOSIS (HCC): ICD-10-CM

## 2023-01-18 DIAGNOSIS — R74.01 TRANSAMINITIS: ICD-10-CM

## 2023-01-18 LAB
ABO GROUP BLD: NORMAL
ALBUMIN FLD-MCNC: 0.4 G/DL
ALBUMIN SERPL BCP-MCNC: 3 G/DL (ref 3.2–4.9)
ALBUMIN/GLOB SERPL: 0.7 G/DL
ALP SERPL-CCNC: 124 U/L (ref 30–99)
ALT SERPL-CCNC: 25 U/L (ref 2–50)
AMMONIA PLAS-SCNC: 19 UMOL/L (ref 11–45)
ANION GAP SERPL CALC-SCNC: 14 MMOL/L (ref 7–16)
APTT PPP: 35.7 SEC (ref 24.7–36)
AST SERPL-CCNC: 74 U/L (ref 12–45)
BASOPHILS # BLD AUTO: 1.6 % (ref 0–1.8)
BASOPHILS # BLD: 0.08 K/UL (ref 0–0.12)
BILIRUB SERPL-MCNC: 2.6 MG/DL (ref 0.1–1.5)
BLD GP AB SCN SERPL QL: NORMAL
BUN SERPL-MCNC: 14 MG/DL (ref 8–22)
CALCIUM ALBUM COR SERPL-MCNC: 8.7 MG/DL (ref 8.5–10.5)
CALCIUM SERPL-MCNC: 7.9 MG/DL (ref 8.5–10.5)
CHLORIDE SERPL-SCNC: 103 MMOL/L (ref 96–112)
CO2 SERPL-SCNC: 13 MMOL/L (ref 20–33)
CREAT SERPL-MCNC: 0.68 MG/DL (ref 0.5–1.4)
EKG IMPRESSION: NORMAL
EOSINOPHIL # BLD AUTO: 0.07 K/UL (ref 0–0.51)
EOSINOPHIL NFR BLD: 1.4 % (ref 0–6.9)
ERYTHROCYTE [DISTWIDTH] IN BLOOD BY AUTOMATED COUNT: 52.7 FL (ref 35.9–50)
ETHANOL BLD-MCNC: 214.5 MG/DL
GFR SERPLBLD CREATININE-BSD FMLA CKD-EPI: 116 ML/MIN/1.73 M 2
GLOBULIN SER CALC-MCNC: 4.2 G/DL (ref 1.9–3.5)
GLUCOSE SERPL-MCNC: 96 MG/DL (ref 65–99)
HCT VFR BLD AUTO: 28.5 % (ref 42–52)
HGB BLD-MCNC: 10.2 G/DL (ref 14–18)
HGB BLD-MCNC: 9.5 G/DL (ref 14–18)
IMM GRANULOCYTES # BLD AUTO: 0.02 K/UL (ref 0–0.11)
IMM GRANULOCYTES NFR BLD AUTO: 0.4 % (ref 0–0.9)
INR PPP: 1.7 (ref 0.87–1.13)
LACTATE SERPL-SCNC: 3.8 MMOL/L (ref 0.5–2)
LIPASE SERPL-CCNC: 100 U/L (ref 11–82)
LYMPHOCYTES # BLD AUTO: 0.76 K/UL (ref 1–4.8)
LYMPHOCYTES NFR BLD: 15.6 % (ref 22–41)
MAGNESIUM SERPL-MCNC: 1.6 MG/DL (ref 1.5–2.5)
MCH RBC QN AUTO: 28.4 PG (ref 27–33)
MCHC RBC AUTO-ENTMCNC: 33.3 G/DL (ref 33.7–35.3)
MCV RBC AUTO: 85.1 FL (ref 81.4–97.8)
MONOCYTES # BLD AUTO: 0.31 K/UL (ref 0–0.85)
MONOCYTES NFR BLD AUTO: 6.4 % (ref 0–13.4)
NEUTROPHILS # BLD AUTO: 3.64 K/UL (ref 1.82–7.42)
NEUTROPHILS NFR BLD: 74.6 % (ref 44–72)
NRBC # BLD AUTO: 0 K/UL
NRBC BLD-RTO: 0 /100 WBC
PLATELET # BLD AUTO: 76 K/UL (ref 164–446)
PMV BLD AUTO: 10.6 FL (ref 9–12.9)
POTASSIUM SERPL-SCNC: 5.2 MMOL/L (ref 3.6–5.5)
PROT SERPL-MCNC: 7.2 G/DL (ref 6–8.2)
PROTHROMBIN TIME: 19.6 SEC (ref 12–14.6)
RBC # BLD AUTO: 3.35 M/UL (ref 4.7–6.1)
RH BLD: NORMAL
SODIUM SERPL-SCNC: 130 MMOL/L (ref 135–145)
TROPONIN T SERPL-MCNC: 10 NG/L (ref 6–19)
WBC # BLD AUTO: 4.9 K/UL (ref 4.8–10.8)

## 2023-01-18 PROCEDURE — 99285 EMERGENCY DEPT VISIT HI MDM: CPT

## 2023-01-18 PROCEDURE — 86850 RBC ANTIBODY SCREEN: CPT

## 2023-01-18 PROCEDURE — 96374 THER/PROPH/DIAG INJ IV PUSH: CPT

## 2023-01-18 PROCEDURE — 700105 HCHG RX REV CODE 258: Performed by: EMERGENCY MEDICINE

## 2023-01-18 PROCEDURE — 88305 TISSUE EXAM BY PATHOLOGIST: CPT

## 2023-01-18 PROCEDURE — 86900 BLOOD TYPING SEROLOGIC ABO: CPT

## 2023-01-18 PROCEDURE — 85018 HEMOGLOBIN: CPT

## 2023-01-18 PROCEDURE — 89051 BODY FLUID CELL COUNT: CPT

## 2023-01-18 PROCEDURE — 0W9G3ZZ DRAINAGE OF PERITONEAL CAVITY, PERCUTANEOUS APPROACH: ICD-10-PCS | Performed by: EMERGENCY MEDICINE

## 2023-01-18 PROCEDURE — 87040 BLOOD CULTURE FOR BACTERIA: CPT | Mod: 91

## 2023-01-18 PROCEDURE — 700111 HCHG RX REV CODE 636 W/ 250 OVERRIDE (IP): Performed by: HOSPITALIST

## 2023-01-18 PROCEDURE — 71045 X-RAY EXAM CHEST 1 VIEW: CPT

## 2023-01-18 PROCEDURE — C9113 INJ PANTOPRAZOLE SODIUM, VIA: HCPCS | Performed by: HOSPITALIST

## 2023-01-18 PROCEDURE — 94760 N-INVAS EAR/PLS OXIMETRY 1: CPT

## 2023-01-18 PROCEDURE — 84484 ASSAY OF TROPONIN QUANT: CPT

## 2023-01-18 PROCEDURE — 96375 TX/PRO/DX INJ NEW DRUG ADDON: CPT

## 2023-01-18 PROCEDURE — 83690 ASSAY OF LIPASE: CPT

## 2023-01-18 PROCEDURE — 84157 ASSAY OF PROTEIN OTHER: CPT

## 2023-01-18 PROCEDURE — 82042 OTHER SOURCE ALBUMIN QUAN EA: CPT

## 2023-01-18 PROCEDURE — C9113 INJ PANTOPRAZOLE SODIUM, VIA: HCPCS | Performed by: EMERGENCY MEDICINE

## 2023-01-18 PROCEDURE — 85610 PROTHROMBIN TIME: CPT

## 2023-01-18 PROCEDURE — 85025 COMPLETE CBC W/AUTO DIFF WBC: CPT

## 2023-01-18 PROCEDURE — 770020 HCHG ROOM/CARE - TELE (206)

## 2023-01-18 PROCEDURE — 99223 1ST HOSP IP/OBS HIGH 75: CPT | Performed by: HOSPITALIST

## 2023-01-18 PROCEDURE — 700111 HCHG RX REV CODE 636 W/ 250 OVERRIDE (IP): Performed by: EMERGENCY MEDICINE

## 2023-01-18 PROCEDURE — 88112 CYTOPATH CELL ENHANCE TECH: CPT

## 2023-01-18 PROCEDURE — 83735 ASSAY OF MAGNESIUM: CPT

## 2023-01-18 PROCEDURE — 700105 HCHG RX REV CODE 258: Performed by: HOSPITALIST

## 2023-01-18 PROCEDURE — 80053 COMPREHEN METABOLIC PANEL: CPT

## 2023-01-18 PROCEDURE — 96376 TX/PRO/DX INJ SAME DRUG ADON: CPT

## 2023-01-18 PROCEDURE — 82077 ASSAY SPEC XCP UR&BREATH IA: CPT

## 2023-01-18 PROCEDURE — 86901 BLOOD TYPING SEROLOGIC RH(D): CPT

## 2023-01-18 PROCEDURE — 83605 ASSAY OF LACTIC ACID: CPT

## 2023-01-18 PROCEDURE — 82140 ASSAY OF AMMONIA: CPT

## 2023-01-18 PROCEDURE — 36415 COLL VENOUS BLD VENIPUNCTURE: CPT

## 2023-01-18 PROCEDURE — 307738 PARACENTESIS ABDOMINAL KIT: Performed by: EMERGENCY MEDICINE

## 2023-01-18 PROCEDURE — 93005 ELECTROCARDIOGRAM TRACING: CPT | Performed by: EMERGENCY MEDICINE

## 2023-01-18 PROCEDURE — 85730 THROMBOPLASTIN TIME PARTIAL: CPT

## 2023-01-18 PROCEDURE — 87070 CULTURE OTHR SPECIMN AEROBIC: CPT

## 2023-01-18 PROCEDURE — 49082 ABD PARACENTESIS: CPT

## 2023-01-18 PROCEDURE — 87205 SMEAR GRAM STAIN: CPT

## 2023-01-18 RX ORDER — PROMETHAZINE HYDROCHLORIDE 25 MG/1
12.5-25 TABLET ORAL EVERY 4 HOURS PRN
Status: DISCONTINUED | OUTPATIENT
Start: 2023-01-18 | End: 2023-01-20 | Stop reason: HOSPADM

## 2023-01-18 RX ORDER — PROMETHAZINE HYDROCHLORIDE 25 MG/1
12.5-25 SUPPOSITORY RECTAL EVERY 4 HOURS PRN
Status: DISCONTINUED | OUTPATIENT
Start: 2023-01-18 | End: 2023-01-20 | Stop reason: HOSPADM

## 2023-01-18 RX ORDER — ONDANSETRON 4 MG/1
4 TABLET, ORALLY DISINTEGRATING ORAL EVERY 4 HOURS PRN
Status: DISCONTINUED | OUTPATIENT
Start: 2023-01-18 | End: 2023-01-20 | Stop reason: HOSPADM

## 2023-01-18 RX ORDER — PROCHLORPERAZINE EDISYLATE 5 MG/ML
5-10 INJECTION INTRAMUSCULAR; INTRAVENOUS EVERY 4 HOURS PRN
Status: DISCONTINUED | OUTPATIENT
Start: 2023-01-18 | End: 2023-01-20 | Stop reason: HOSPADM

## 2023-01-18 RX ORDER — AMOXICILLIN 250 MG
2 CAPSULE ORAL 2 TIMES DAILY
Status: DISCONTINUED | OUTPATIENT
Start: 2023-01-18 | End: 2023-01-19

## 2023-01-18 RX ORDER — ALBUMIN (HUMAN) 12.5 G/50ML
50 SOLUTION INTRAVENOUS ONCE
Status: COMPLETED | OUTPATIENT
Start: 2023-01-18 | End: 2023-01-19

## 2023-01-18 RX ORDER — MAGNESIUM SULFATE HEPTAHYDRATE 40 MG/ML
2 INJECTION, SOLUTION INTRAVENOUS ONCE
Status: COMPLETED | OUTPATIENT
Start: 2023-01-18 | End: 2023-01-19

## 2023-01-18 RX ORDER — BISACODYL 10 MG
10 SUPPOSITORY, RECTAL RECTAL
Status: DISCONTINUED | OUTPATIENT
Start: 2023-01-18 | End: 2023-01-19

## 2023-01-18 RX ORDER — OCTREOTIDE ACETATE 100 UG/ML
50 INJECTION, SOLUTION INTRAVENOUS; SUBCUTANEOUS ONCE
Status: COMPLETED | OUTPATIENT
Start: 2023-01-18 | End: 2023-01-18

## 2023-01-18 RX ORDER — CEFTRIAXONE 2 G/1
2000 INJECTION, POWDER, FOR SOLUTION INTRAMUSCULAR; INTRAVENOUS ONCE
Status: COMPLETED | OUTPATIENT
Start: 2023-01-18 | End: 2023-01-18

## 2023-01-18 RX ORDER — POLYETHYLENE GLYCOL 3350 17 G/17G
1 POWDER, FOR SOLUTION ORAL
Status: DISCONTINUED | OUTPATIENT
Start: 2023-01-18 | End: 2023-01-19

## 2023-01-18 RX ORDER — SODIUM CHLORIDE 9 MG/ML
1000 INJECTION, SOLUTION INTRAVENOUS ONCE
Status: COMPLETED | OUTPATIENT
Start: 2023-01-18 | End: 2023-01-19

## 2023-01-18 RX ORDER — ONDANSETRON 2 MG/ML
4 INJECTION INTRAMUSCULAR; INTRAVENOUS EVERY 4 HOURS PRN
Status: DISCONTINUED | OUTPATIENT
Start: 2023-01-18 | End: 2023-01-20 | Stop reason: HOSPADM

## 2023-01-18 RX ADMIN — SODIUM CHLORIDE 1000 ML: 9 INJECTION, SOLUTION INTRAVENOUS at 19:00

## 2023-01-18 RX ADMIN — CEFTRIAXONE SODIUM 2000 MG: 2 INJECTION, POWDER, FOR SOLUTION INTRAMUSCULAR; INTRAVENOUS at 18:21

## 2023-01-18 RX ADMIN — OCTREOTIDE ACETATE 50 MCG/HR: 200 INJECTION INTRAVENOUS at 20:29

## 2023-01-18 RX ADMIN — OCTREOTIDE ACETATE 50 MCG: 100 INJECTION, SOLUTION INTRAVENOUS; SUBCUTANEOUS at 20:25

## 2023-01-18 RX ADMIN — MAGNESIUM SULFATE HEPTAHYDRATE 2 G: 40 INJECTION, SOLUTION INTRAVENOUS at 23:09

## 2023-01-18 RX ADMIN — SODIUM CHLORIDE 80 MG: 9 INJECTION, SOLUTION INTRAVENOUS at 18:46

## 2023-01-18 RX ADMIN — SODIUM CHLORIDE 8 MG/HR: 9 INJECTION, SOLUTION INTRAVENOUS at 22:33

## 2023-01-18 ASSESSMENT — COGNITIVE AND FUNCTIONAL STATUS - GENERAL
MOBILITY SCORE: 24
SUGGESTED CMS G CODE MODIFIER DAILY ACTIVITY: CH
SUGGESTED CMS G CODE MODIFIER MOBILITY: CH
DAILY ACTIVITIY SCORE: 24

## 2023-01-18 ASSESSMENT — PAIN DESCRIPTION - PAIN TYPE: TYPE: ACUTE PAIN

## 2023-01-18 ASSESSMENT — ENCOUNTER SYMPTOMS
NECK PAIN: 0
HEADACHES: 0
DIZZINESS: 1
PHOTOPHOBIA: 0
CONSTIPATION: 0
STRIDOR: 0
FLANK PAIN: 0
BLOOD IN STOOL: 1
SHORTNESS OF BREATH: 0
ABDOMINAL PAIN: 1
PALPITATIONS: 0
DIAPHORESIS: 0
MYALGIAS: 0
VOMITING: 0
ORTHOPNEA: 0
TINGLING: 0
EYE PAIN: 0
SORE THROAT: 0
SINUS PAIN: 0
PND: 0
HEMOPTYSIS: 0
BLURRED VISION: 0
WHEEZING: 0
BACK PAIN: 0
FEVER: 0
NAUSEA: 0
BRUISES/BLEEDS EASILY: 0
WEAKNESS: 0
DEPRESSION: 0
COUGH: 0
POLYDIPSIA: 0
SPUTUM PRODUCTION: 0
CHILLS: 0
FALLS: 0
DOUBLE VISION: 0
CLAUDICATION: 0
HALLUCINATIONS: 0
HEARTBURN: 0
DIARRHEA: 0
TREMORS: 0

## 2023-01-18 ASSESSMENT — FIBROSIS 4 INDEX
FIB4 SCORE: 8.76
FIB4 SCORE: 8.28

## 2023-01-18 ASSESSMENT — LIFESTYLE VARIABLES
TOTAL SCORE: 4
ON A TYPICAL DAY WHEN YOU DRINK ALCOHOL HOW MANY DRINKS DO YOU HAVE: 7
AVERAGE NUMBER OF DAYS PER WEEK YOU HAVE A DRINK CONTAINING ALCOHOL: 7
HAVE PEOPLE ANNOYED YOU BY CRITICIZING YOUR DRINKING: YES
DOES PATIENT WANT TO STOP DRINKING: YES
ALCOHOL_USE: YES
EVER HAD A DRINK FIRST THING IN THE MORNING TO STEADY YOUR NERVES TO GET RID OF A HANGOVER: YES
DOES PATIENT WANT TO TALK TO SOMEONE ABOUT QUITTING: NO
SUBSTANCE_ABUSE: 0
EVER FELT BAD OR GUILTY ABOUT YOUR DRINKING: YES
HOW MANY TIMES IN THE PAST YEAR HAVE YOU HAD 5 OR MORE DRINKS IN A DAY: 365
TOTAL SCORE: 4
TOTAL SCORE: 4
CONSUMPTION TOTAL: POSITIVE
HAVE YOU EVER FELT YOU SHOULD CUT DOWN ON YOUR DRINKING: YES

## 2023-01-19 ENCOUNTER — ANESTHESIA (OUTPATIENT)
Dept: SURGERY | Facility: MEDICAL CENTER | Age: 48
DRG: 442 | End: 2023-01-19
Payer: MEDICAID

## 2023-01-19 ENCOUNTER — ANESTHESIA EVENT (OUTPATIENT)
Dept: SURGERY | Facility: MEDICAL CENTER | Age: 48
DRG: 442 | End: 2023-01-19
Payer: MEDICAID

## 2023-01-19 LAB
ALBUMIN SERPL BCP-MCNC: 3.1 G/DL (ref 3.2–4.9)
ALBUMIN/GLOB SERPL: 1 G/DL
ALP SERPL-CCNC: 94 U/L (ref 30–99)
ALT SERPL-CCNC: 19 U/L (ref 2–50)
ANION GAP SERPL CALC-SCNC: 11 MMOL/L (ref 7–16)
APPEARANCE FLD: CLEAR
AST SERPL-CCNC: 53 U/L (ref 12–45)
BARCODED ABORH UBTYP: 7300
BARCODED PRD CODE UBPRD: NORMAL
BARCODED UNIT NUM UBUNT: NORMAL
BASOPHILS # BLD AUTO: 1.2 % (ref 0–1.8)
BASOPHILS # BLD: 0.03 K/UL (ref 0–0.12)
BILIRUB SERPL-MCNC: 2.3 MG/DL (ref 0.1–1.5)
BODY FLD TYPE: NORMAL
BODY FLD TYPE: NORMAL
BUN SERPL-MCNC: 16 MG/DL (ref 8–22)
CALCIUM ALBUM COR SERPL-MCNC: 8.3 MG/DL (ref 8.5–10.5)
CALCIUM SERPL-MCNC: 7.6 MG/DL (ref 8.5–10.5)
CHLORIDE SERPL-SCNC: 100 MMOL/L (ref 96–112)
CO2 SERPL-SCNC: 14 MMOL/L (ref 20–33)
COLOR FLD: YELLOW
COMPONENT P 8504P: NORMAL
CREAT SERPL-MCNC: 0.7 MG/DL (ref 0.5–1.4)
CYTOLOGY REG CYTOL: NORMAL
EOSINOPHIL # BLD AUTO: 0.03 K/UL (ref 0–0.51)
EOSINOPHIL NFR BLD: 1.2 % (ref 0–6.9)
ERYTHROCYTE [DISTWIDTH] IN BLOOD BY AUTOMATED COUNT: 49.9 FL (ref 35.9–50)
GFR SERPLBLD CREATININE-BSD FMLA CKD-EPI: 115 ML/MIN/1.73 M 2
GLOBULIN SER CALC-MCNC: 3.2 G/DL (ref 1.9–3.5)
GLUCOSE SERPL-MCNC: 167 MG/DL (ref 65–99)
GRAM STN SPEC: NORMAL
HCT VFR BLD AUTO: 22.1 % (ref 42–52)
HCT VFR BLD AUTO: 28 % (ref 42–52)
HGB BLD-MCNC: 7.6 G/DL (ref 14–18)
HGB BLD-MCNC: 9.4 G/DL (ref 14–18)
IMM GRANULOCYTES # BLD AUTO: 0.01 K/UL (ref 0–0.11)
IMM GRANULOCYTES NFR BLD AUTO: 0.4 % (ref 0–0.9)
LACTATE SERPL-SCNC: 1.3 MMOL/L (ref 0.5–2)
LYMPHOCYTES # BLD AUTO: 0.44 K/UL (ref 1–4.8)
LYMPHOCYTES NFR BLD: 18.3 % (ref 22–41)
LYMPHOCYTES NFR FLD: 20 %
MCH RBC QN AUTO: 28.8 PG (ref 27–33)
MCHC RBC AUTO-ENTMCNC: 34.4 G/DL (ref 33.7–35.3)
MCV RBC AUTO: 83.7 FL (ref 81.4–97.8)
MONOCYTES # BLD AUTO: 0.27 K/UL (ref 0–0.85)
MONOCYTES NFR BLD AUTO: 11.2 % (ref 0–13.4)
MONONUC CELLS NFR FLD: 76 %
NEUTROPHILS # BLD AUTO: 1.63 K/UL (ref 1.82–7.42)
NEUTROPHILS NFR BLD: 67.7 % (ref 44–72)
NEUTROPHILS NFR FLD: 4 %
NRBC # BLD AUTO: 0 K/UL
NRBC BLD-RTO: 0 /100 WBC
PLATELET # BLD AUTO: 40 K/UL (ref 164–446)
PMV BLD AUTO: 12.2 FL (ref 9–12.9)
POTASSIUM SERPL-SCNC: 5.5 MMOL/L (ref 3.6–5.5)
PRODUCT TYPE UPROD: NORMAL
PROT FLD-MCNC: 0.8 G/DL
PROT SERPL-MCNC: 6.3 G/DL (ref 6–8.2)
RBC # BLD AUTO: 2.64 M/UL (ref 4.7–6.1)
RBC # FLD: <2000 CELLS/UL
SIGNIFICANT IND 70042: NORMAL
SITE SITE: NORMAL
SODIUM SERPL-SCNC: 125 MMOL/L (ref 135–145)
SOURCE SOURCE: NORMAL
UNIT STATUS USTAT: NORMAL
WBC # BLD AUTO: 2.4 K/UL (ref 4.8–10.8)
WBC # FLD: 45 CELLS/UL

## 2023-01-19 PROCEDURE — 06L38CZ OCCLUSION OF ESOPHAGEAL VEIN WITH EXTRALUMINAL DEVICE, VIA NATURAL OR ARTIFICIAL OPENING ENDOSCOPIC: ICD-10-PCS | Performed by: INTERNAL MEDICINE

## 2023-01-19 PROCEDURE — 160048 HCHG OR STATISTICAL LEVEL 1-5: Performed by: INTERNAL MEDICINE

## 2023-01-19 PROCEDURE — P9047 ALBUMIN (HUMAN), 25%, 50ML: HCPCS | Performed by: HOSPITALIST

## 2023-01-19 PROCEDURE — 160002 HCHG RECOVERY MINUTES (STAT): Performed by: INTERNAL MEDICINE

## 2023-01-19 PROCEDURE — 700111 HCHG RX REV CODE 636 W/ 250 OVERRIDE (IP): Performed by: STUDENT IN AN ORGANIZED HEALTH CARE EDUCATION/TRAINING PROGRAM

## 2023-01-19 PROCEDURE — 85025 COMPLETE CBC W/AUTO DIFF WBC: CPT

## 2023-01-19 PROCEDURE — 85014 HEMATOCRIT: CPT

## 2023-01-19 PROCEDURE — 160009 HCHG ANES TIME/MIN: Performed by: INTERNAL MEDICINE

## 2023-01-19 PROCEDURE — A9270 NON-COVERED ITEM OR SERVICE: HCPCS | Performed by: HOSPITALIST

## 2023-01-19 PROCEDURE — 700111 HCHG RX REV CODE 636 W/ 250 OVERRIDE (IP): Performed by: HOSPITALIST

## 2023-01-19 PROCEDURE — 85018 HEMOGLOBIN: CPT

## 2023-01-19 PROCEDURE — P9034 PLATELETS, PHERESIS: HCPCS

## 2023-01-19 PROCEDURE — 83605 ASSAY OF LACTIC ACID: CPT

## 2023-01-19 PROCEDURE — 160203 HCHG ENDO MINUTES - 1ST 30 MINS LEVEL 4: Performed by: INTERNAL MEDICINE

## 2023-01-19 PROCEDURE — 80053 COMPREHEN METABOLIC PANEL: CPT

## 2023-01-19 PROCEDURE — 99254 IP/OBS CNSLTJ NEW/EST MOD 60: CPT | Mod: 25 | Performed by: INTERNAL MEDICINE

## 2023-01-19 PROCEDURE — 160035 HCHG PACU - 1ST 60 MINS PHASE I: Performed by: INTERNAL MEDICINE

## 2023-01-19 PROCEDURE — 43244 EGD VARICES LIGATION: CPT | Performed by: INTERNAL MEDICINE

## 2023-01-19 PROCEDURE — 700111 HCHG RX REV CODE 636 W/ 250 OVERRIDE (IP)

## 2023-01-19 PROCEDURE — 99140 ANES COMP EMERGENCY COND: CPT | Performed by: STUDENT IN AN ORGANIZED HEALTH CARE EDUCATION/TRAINING PROGRAM

## 2023-01-19 PROCEDURE — 30233R1 TRANSFUSION OF NONAUTOLOGOUS PLATELETS INTO PERIPHERAL VEIN, PERCUTANEOUS APPROACH: ICD-10-PCS | Performed by: HOSPITALIST

## 2023-01-19 PROCEDURE — 700101 HCHG RX REV CODE 250: Performed by: STUDENT IN AN ORGANIZED HEALTH CARE EDUCATION/TRAINING PROGRAM

## 2023-01-19 PROCEDURE — 36430 TRANSFUSION BLD/BLD COMPNT: CPT

## 2023-01-19 PROCEDURE — 99233 SBSQ HOSP IP/OBS HIGH 50: CPT | Performed by: HOSPITALIST

## 2023-01-19 PROCEDURE — 700102 HCHG RX REV CODE 250 W/ 637 OVERRIDE(OP): Performed by: HOSPITALIST

## 2023-01-19 PROCEDURE — 00731 ANES UPR GI NDSC PX NOS: CPT | Performed by: STUDENT IN AN ORGANIZED HEALTH CARE EDUCATION/TRAINING PROGRAM

## 2023-01-19 PROCEDURE — 770020 HCHG ROOM/CARE - TELE (206)

## 2023-01-19 PROCEDURE — 700105 HCHG RX REV CODE 258: Performed by: STUDENT IN AN ORGANIZED HEALTH CARE EDUCATION/TRAINING PROGRAM

## 2023-01-19 PROCEDURE — 700105 HCHG RX REV CODE 258: Performed by: HOSPITALIST

## 2023-01-19 RX ORDER — SPIRONOLACTONE 25 MG/1
50 TABLET ORAL
Status: DISCONTINUED | OUTPATIENT
Start: 2023-01-20 | End: 2023-01-19

## 2023-01-19 RX ORDER — OMEPRAZOLE 20 MG/1
20 CAPSULE, DELAYED RELEASE ORAL 2 TIMES DAILY
Status: DISCONTINUED | OUTPATIENT
Start: 2023-01-19 | End: 2023-01-20 | Stop reason: HOSPADM

## 2023-01-19 RX ORDER — PANTOPRAZOLE SODIUM 40 MG/10ML
40 INJECTION, POWDER, LYOPHILIZED, FOR SOLUTION INTRAVENOUS 2 TIMES DAILY
Status: DISCONTINUED | OUTPATIENT
Start: 2023-01-19 | End: 2023-01-19

## 2023-01-19 RX ORDER — DEXAMETHASONE SODIUM PHOSPHATE 4 MG/ML
INJECTION, SOLUTION INTRA-ARTICULAR; INTRALESIONAL; INTRAMUSCULAR; INTRAVENOUS; SOFT TISSUE PRN
Status: DISCONTINUED | OUTPATIENT
Start: 2023-01-19 | End: 2023-01-19 | Stop reason: SURG

## 2023-01-19 RX ORDER — SULFAMETHOXAZOLE AND TRIMETHOPRIM 800; 160 MG/1; MG/1
1 TABLET ORAL EVERY 12 HOURS
Status: DISCONTINUED | OUTPATIENT
Start: 2023-01-20 | End: 2023-01-20 | Stop reason: HOSPADM

## 2023-01-19 RX ORDER — SUCCINYLCHOLINE CHLORIDE 20 MG/ML
INJECTION INTRAMUSCULAR; INTRAVENOUS PRN
Status: DISCONTINUED | OUTPATIENT
Start: 2023-01-19 | End: 2023-01-19 | Stop reason: SURG

## 2023-01-19 RX ORDER — HYDROMORPHONE HYDROCHLORIDE 1 MG/ML
0.5 INJECTION, SOLUTION INTRAMUSCULAR; INTRAVENOUS; SUBCUTANEOUS ONCE
Status: COMPLETED | OUTPATIENT
Start: 2023-01-19 | End: 2023-01-19

## 2023-01-19 RX ORDER — FUROSEMIDE 10 MG/ML
40 SOLUTION ORAL
Status: DISCONTINUED | OUTPATIENT
Start: 2023-01-19 | End: 2023-01-19 | Stop reason: SURG

## 2023-01-19 RX ORDER — FUROSEMIDE 40 MG/1
40 TABLET ORAL
Status: DISCONTINUED | OUTPATIENT
Start: 2023-01-20 | End: 2023-01-19

## 2023-01-19 RX ORDER — SPIRONOLACTONE 25 MG/1
100 TABLET ORAL
Status: DISCONTINUED | OUTPATIENT
Start: 2023-01-20 | End: 2023-01-20 | Stop reason: HOSPADM

## 2023-01-19 RX ORDER — GAUZE BANDAGE 2" X 2"
100 BANDAGE TOPICAL DAILY
Status: DISCONTINUED | OUTPATIENT
Start: 2023-01-20 | End: 2023-01-20 | Stop reason: HOSPADM

## 2023-01-19 RX ORDER — LIDOCAINE HYDROCHLORIDE 20 MG/ML
INJECTION, SOLUTION EPIDURAL; INFILTRATION; INTRACAUDAL; PERINEURAL PRN
Status: DISCONTINUED | OUTPATIENT
Start: 2023-01-19 | End: 2023-01-19 | Stop reason: SURG

## 2023-01-19 RX ORDER — HYDROMORPHONE HYDROCHLORIDE 1 MG/ML
0.5 INJECTION, SOLUTION INTRAMUSCULAR; INTRAVENOUS; SUBCUTANEOUS EVERY 4 HOURS PRN
Status: DISCONTINUED | OUTPATIENT
Start: 2023-01-19 | End: 2023-01-20 | Stop reason: HOSPADM

## 2023-01-19 RX ORDER — SPIRONOLACTONE 25 MG/1
100 TABLET ORAL
Status: DISCONTINUED | OUTPATIENT
Start: 2023-01-19 | End: 2023-01-19 | Stop reason: SURG

## 2023-01-19 RX ORDER — PHYTONADIONE 5 MG/1
5 TABLET ORAL ONCE
Status: COMPLETED | OUTPATIENT
Start: 2023-01-19 | End: 2023-01-19

## 2023-01-19 RX ORDER — LACTULOSE 20 G/30ML
30 SOLUTION ORAL 2 TIMES DAILY
Status: DISCONTINUED | OUTPATIENT
Start: 2023-01-19 | End: 2023-01-20 | Stop reason: HOSPADM

## 2023-01-19 RX ORDER — FUROSEMIDE 40 MG/1
40 TABLET ORAL
Status: DISCONTINUED | OUTPATIENT
Start: 2023-01-20 | End: 2023-01-20 | Stop reason: HOSPADM

## 2023-01-19 RX ORDER — ONDANSETRON 2 MG/ML
INJECTION INTRAMUSCULAR; INTRAVENOUS PRN
Status: DISCONTINUED | OUTPATIENT
Start: 2023-01-19 | End: 2023-01-19 | Stop reason: SURG

## 2023-01-19 RX ORDER — SPIRONOLACTONE 25 MG/1
100 TABLET ORAL
Status: DISCONTINUED | OUTPATIENT
Start: 2023-01-20 | End: 2023-01-19

## 2023-01-19 RX ORDER — SODIUM CHLORIDE, SODIUM LACTATE, POTASSIUM CHLORIDE, CALCIUM CHLORIDE 600; 310; 30; 20 MG/100ML; MG/100ML; MG/100ML; MG/100ML
INJECTION, SOLUTION INTRAVENOUS
Status: DISCONTINUED | OUTPATIENT
Start: 2023-01-19 | End: 2023-01-19 | Stop reason: SURG

## 2023-01-19 RX ADMIN — OMEPRAZOLE 20 MG: 20 CAPSULE, DELAYED RELEASE ORAL at 18:18

## 2023-01-19 RX ADMIN — PHYTONADIONE 5 MG: 5 TABLET ORAL at 12:01

## 2023-01-19 RX ADMIN — ALBUMIN (HUMAN) 50 G: 0.25 INJECTION, SOLUTION INTRAVENOUS at 01:27

## 2023-01-19 RX ADMIN — SODIUM CHLORIDE, POTASSIUM CHLORIDE, SODIUM LACTATE AND CALCIUM CHLORIDE: 600; 310; 30; 20 INJECTION, SOLUTION INTRAVENOUS at 14:12

## 2023-01-19 RX ADMIN — THIAMINE HYDROCHLORIDE 100 MG: 100 INJECTION, SOLUTION INTRAMUSCULAR; INTRAVENOUS at 05:16

## 2023-01-19 RX ADMIN — FENTANYL CITRATE 100 MCG: 50 INJECTION, SOLUTION INTRAMUSCULAR; INTRAVENOUS at 14:14

## 2023-01-19 RX ADMIN — HYDROMORPHONE HYDROCHLORIDE 0.5 MG: 1 INJECTION, SOLUTION INTRAMUSCULAR; INTRAVENOUS; SUBCUTANEOUS at 07:56

## 2023-01-19 RX ADMIN — PROPOFOL 150 MG: 10 INJECTION, EMULSION INTRAVENOUS at 14:16

## 2023-01-19 RX ADMIN — LIDOCAINE HYDROCHLORIDE 100 MG: 20 INJECTION, SOLUTION EPIDURAL; INFILTRATION; INTRACAUDAL at 14:16

## 2023-01-19 RX ADMIN — SUCCINYLCHOLINE CHLORIDE 40 MG: 20 INJECTION, SOLUTION INTRAMUSCULAR; INTRAVENOUS; PARENTERAL at 14:16

## 2023-01-19 RX ADMIN — LACTULOSE 30 ML: 20 SOLUTION ORAL at 18:18

## 2023-01-19 RX ADMIN — ONDANSETRON 4 MG: 2 INJECTION INTRAMUSCULAR; INTRAVENOUS at 14:19

## 2023-01-19 RX ADMIN — DEXAMETHASONE SODIUM PHOSPHATE 4 MG: 4 INJECTION, SOLUTION INTRA-ARTICULAR; INTRALESIONAL; INTRAMUSCULAR; INTRAVENOUS; SOFT TISSUE at 14:19

## 2023-01-19 RX ADMIN — HYDROMORPHONE HYDROCHLORIDE 0.5 MG: 1 INJECTION, SOLUTION INTRAMUSCULAR; INTRAVENOUS; SUBCUTANEOUS at 19:35

## 2023-01-19 ASSESSMENT — ENCOUNTER SYMPTOMS
PALPITATIONS: 0
HEARTBURN: 0
EYE PAIN: 0
BLOOD IN STOOL: 1
MYALGIAS: 0
HEADACHES: 0
EYES NEGATIVE: 1
SHORTNESS OF BREATH: 1
NAUSEA: 0
FEVER: 0
VOMITING: 1
ABDOMINAL PAIN: 1
CARDIOVASCULAR NEGATIVE: 1
VOMITING: 0
EYE DISCHARGE: 0
DIZZINESS: 0
WHEEZING: 0
WEIGHT LOSS: 0
CONSTIPATION: 0
ORTHOPNEA: 0
PND: 0
DIARRHEA: 0
RESPIRATORY NEGATIVE: 1
NECK PAIN: 0
NEUROLOGICAL NEGATIVE: 1
CHILLS: 0
COUGH: 0
MUSCULOSKELETAL NEGATIVE: 1

## 2023-01-19 ASSESSMENT — PATIENT HEALTH QUESTIONNAIRE - PHQ9
1. LITTLE INTEREST OR PLEASURE IN DOING THINGS: NOT AT ALL
2. FEELING DOWN, DEPRESSED, IRRITABLE, OR HOPELESS: NOT AT ALL
SUM OF ALL RESPONSES TO PHQ9 QUESTIONS 1 AND 2: 0

## 2023-01-19 ASSESSMENT — PAIN DESCRIPTION - PAIN TYPE
TYPE: ACUTE PAIN
TYPE: ACUTE PAIN
TYPE: SURGICAL PAIN

## 2023-01-19 ASSESSMENT — PAIN SCALES - GENERAL: PAIN_LEVEL: 2

## 2023-01-19 NOTE — ANESTHESIA PROCEDURE NOTES
Airway    Date/Time: 1/19/2023 2:18 PM  Performed by: Prashant Hill M.D.  Authorized by: Prashant Hill M.D.     Location:  OR  Urgency:  Elective  Indications for Airway Management:  Anesthesia      Spontaneous Ventilation: absent    Sedation Level:  Deep  Preoxygenated: Yes    Patient Position:  Sniffing  Mask Difficulty Assessment:  0 - not attempted  Final Airway Type:  Endotracheal airway  Final Endotracheal Airway:  ETT  Cuffed: Yes    Technique Used for Successful ETT Placement:  Direct laryngoscopy    Insertion Site:  Oral  Blade Type:  Rita  Laryngoscope Blade/Videolaryngoscope Blade Size:  3  ETT Size (mm):  7.0  Measured from:  Teeth  ETT to Teeth (cm):  21  Placement Verified by: auscultation and capnometry    Cormack-Lehane Classification:  Grade I - full view of glottis  Number of Attempts at Approach:  1

## 2023-01-19 NOTE — PROGRESS NOTES
4 Eyes Skin Assessment Completed by HEENA Phelan and HEENA Rosenbaum.    Head WDL  Ears WDL  Nose WDL  Mouth WDL  Neck WDL  Breast/Chest WDL  Shoulder Blades WDL  Spine WDL  (R) Arm/Elbow/Hand WDL  (L) Arm/Elbow/Hand WDL  Abdomen Incision (puncture from paracentesis)  Groin WDL  Scrotum/Coccyx/Buttocks WDL  (R) Leg WDL  (L) Leg WDL  (R) Heel/Foot/Toe WDL  (L) Heel/Foot/Toe WDL          Devices In Places Tele Box      Interventions In Place Pillows and Heels Loaded W/Pillows    Possible Skin Injury No    Pictures Uploaded Into Epic N/A  Wound Consult Placed N/A  RN Wound Prevention Protocol Ordered Yes

## 2023-01-19 NOTE — ASSESSMENT & PLAN NOTE
With hematemesis and melena, likely upper GI source  Continuous cardiac monitoring  NPO  Patient is started on IV Protonix, octreotide and ceftriaxone  Monitor H&H every 8 hours, transfuse for hemoglobin less than 7  GI in the morning for endoscopic evaluation

## 2023-01-19 NOTE — ED NOTES
Pt transported to the floor via gurney by RN. Pt alert and oriented at this time. Pt belongings and paper work sent with patient.

## 2023-01-19 NOTE — ASSESSMENT & PLAN NOTE
Secondary to alcoholic cirrhosis  Meld 22  Start IV Lasix and Aldactone once bleeding has improved  Check ammonia

## 2023-01-19 NOTE — CARE PLAN
Problem: Psychosocial  Goal: Patient's ability to verbalize feelings about condition will improve  Outcome: Progressing     Problem: Psychosocial  Goal: Patient's level of anxiety will decrease  Outcome: Progressing   The patient is Stable - Low risk of patient condition declining or worsening       Clinical goal: monitor vital signs/ blood result values  Patient goal: rest and increase comfort  Family: feel better and dc home

## 2023-01-19 NOTE — ANESTHESIA PREPROCEDURE EVALUATION
Case: [id number] Date/Time: 01/19/23 1320    Procedures:       GASTROSCOPY with banding (Esophagus)      GASTROSCOPY, WITH VARICEAL BANDING (Esophagus)    Anesthesia type: General    Location: [facility] ROOM 26 / SURGERY SAME DAY [facility]    Surgeons: Ivon Clark M.D.          Relevant Problems      (positive) Acute renal failure with tubular necrosis (HCC)   (positive) Alcoholic liver disease (HCC)   (positive) Decompensated hepatic cirrhosis (HCC)       Physical Exam    Airway   Mallampati: II  TM distance: >3 FB  Neck ROM: full       Cardiovascular - normal exam  Rhythm: regular  Rate: normal  (-) murmur     Dental - normal exam           Pulmonary - normal exam  Breath sounds clear to auscultation     Abdominal    Neurological - normal exam                 Anesthesia Plan    ASA 3- EMERGENT   ASA physical status 3 criteria: alcohol and/or substance dependence or abuseASA physical status emergent criteria: acute hemorrhage    Plan - general       Airway plan will be ETT          Induction: intravenous    Postoperative Plan: Postoperative administration of opioids is intended.    Pertinent diagnostic labs and testing reviewed    Informed Consent:    Anesthetic plan and risks discussed with patient.

## 2023-01-19 NOTE — H&P
Hospital Medicine History & Physical Note    Date of Service  1/18/2023    Primary Care Physician  Tomy Wilde M.D.    Consultants  Gastroenterology    Specialist Names:     Code Status  Full Code    Chief Complaint  Chief Complaint   Patient presents with    Sent by MD     Transferred from [facility] in [place] for GI consult and resources.     Abdominal Swelling     Hx of alcoholic cirrhosis with ascites, pt gets paracentesis 2x a week in [place].     N/V     Pt presented to ER in [place] with hematemesis. Hx of esophageal varices with recent scope in [place] 6 months ago.        History of Presenting Illness  Jimbo Santacruz is a 47 y.o. male who presented 1/18/2023 with Past medical history of alcoholic cirrhosis, history of esophageal varices who presents to the hospital for hematemesis and abdominal distention.  She states that 3 days ago he started developing right lower quadrant abdominal pain which prompted him to drink alcohol.  For the past 2 days he has been having multiple episodes of vomiting with p.o. blood.  He also noticed dark stools.  He has been feeling lightheaded and dizzy.  He does not remember the last time he ate food but believes it must have been 4 days ago.  He denies any chest pain, shortness of breath, fever.  Patient did have a endoscopy and 2019 which showed portal gastropathy and 3 chains of esophageal varices that were banded.    He presented to hospital tachycardic and with a blood pressure 116/75.  Hemoglobin 9.5.    I discussed the plan of care with patient.    Review of Systems  Review of Systems   Constitutional:  Positive for malaise/fatigue. Negative for chills, diaphoresis and fever.   HENT:  Negative for congestion, ear discharge, ear pain, hearing loss, nosebleeds, sinus pain, sore throat and tinnitus.    Eyes:  Negative for blurred vision, double vision, photophobia and pain.   Respiratory:  Negative for cough, hemoptysis, sputum production, shortness of  breath, wheezing and stridor.    Cardiovascular:  Negative for chest pain, palpitations, orthopnea, claudication, leg swelling and PND.   Gastrointestinal:  Positive for abdominal pain, blood in stool and melena. Negative for constipation, diarrhea, heartburn, nausea and vomiting.   Genitourinary:  Negative for dysuria, flank pain, frequency, hematuria and urgency.   Musculoskeletal:  Negative for back pain, falls, joint pain, myalgias and neck pain.   Skin:  Negative for itching and rash.   Neurological:  Positive for dizziness. Negative for tingling, tremors, weakness and headaches.   Endo/Heme/Allergies:  Negative for environmental allergies and polydipsia. Does not bruise/bleed easily.   Psychiatric/Behavioral:  Negative for depression, hallucinations, substance abuse and suicidal ideas.      Past Medical History   has a past medical history of Anemia, Cirrhosis (HCC), ETOH abuse, GI bleed, Liver disease, and Pancreatitis.    Surgical History   has a past surgical history that includes hernia repair; other; gastroscopy-endo (N/A, 4/13/2019); and gastroscopy-endo (9/27/2019).     Family History  family history includes Diabetes in his father.   Family history reviewed with patient. There is no family history that is pertinent to the chief complaint.     Social History   reports that he has never smoked. He has never used smokeless tobacco. He reports current alcohol use. He reports that he does not use drugs.    Allergies  No Known Allergies    Medications  Prior to Admission Medications   Prescriptions Last Dose Informant Patient Reported? Taking?   lactulose 10 GM/15ML Solution 1/17/2023 at PM Patient Yes No   Sig: Take 20 g by mouth 3 times a day. 15-30 mL BID   magnesium oxide (MAG-OX) 400 MG Tab tablet 1/17/2023 at AM Patient No No   Sig: Take 1 Tab by mouth 2 Times a Day.   omeprazole (PRILOSEC) 40 MG delayed-release capsule 1/17/2023 at AM Patient No No   Sig: Take 1 Cap by mouth 2 Times a Day.    propranolol (INDERAL) 10 MG Tab 1/17/2023 at AM Patient No No   Sig: Take 1 Tab by mouth every 8 hours. Hold for SBP <100  or DBP < 55      Facility-Administered Medications: None       Physical Exam  Temp:  [37.1 °C (98.8 °F)] 37.1 °C (98.8 °F)  Pulse:  [110-111] 111  Resp:  [18] 18  BP: (116-135)/(75-78) 116/75  SpO2:  [97 %-98 %] 97 %  Blood Pressure: 116/75   Temperature: 37.1 °C (98.8 °F)   Pulse: (!) 111   Respiration: 18   Pulse Oximetry: 97 %       Physical Exam  Vitals and nursing note reviewed.   Constitutional:       General: He is not in acute distress.     Appearance: Normal appearance. He is not ill-appearing, toxic-appearing or diaphoretic.   HENT:      Head: Normocephalic and atraumatic.      Nose: No congestion or rhinorrhea.      Mouth/Throat:      Pharynx: No posterior oropharyngeal erythema.   Eyes:      General: No scleral icterus.        Right eye: No discharge.   Neck:      Vascular: Hepatojugular reflux and JVD present.   Cardiovascular:      Rate and Rhythm: Normal rate and regular rhythm.      Pulses: Normal pulses.      Heart sounds: Normal heart sounds. No murmur heard.    No friction rub. No gallop.   Pulmonary:      Effort: Pulmonary effort is normal. No respiratory distress.      Breath sounds: Normal breath sounds. No stridor. No wheezing, rhonchi or rales.   Abdominal:      General: There is distension.      Tenderness: There is abdominal tenderness.   Musculoskeletal:         General: Swelling present. No tenderness, deformity or signs of injury.      Cervical back: Normal range of motion.      Right lower leg: Edema present.      Left lower leg: Edema present.   Skin:     Capillary Refill: Capillary refill takes more than 3 seconds.      Coloration: Skin is not jaundiced or pale.      Findings: No bruising, erythema, lesion or rash.   Neurological:      General: No focal deficit present.      Mental Status: He is alert and oriented to person, place, and time.        Laboratory:  Recent Labs     01/18/23  1729   WBC 4.9   RBC 3.35*   HEMOGLOBIN 9.5*   HEMATOCRIT 28.5*   MCV 85.1   MCH 28.4   MCHC 33.3*   RDW 52.7*   PLATELETCT 76*   MPV 10.6     Recent Labs     01/18/23  1729   SODIUM 130*   POTASSIUM 5.2   CHLORIDE 103   CO2 13*   GLUCOSE 96   BUN 14   CREATININE 0.68   CALCIUM 7.9*     Recent Labs     01/18/23  1729   ALTSGPT 25   ASTSGOT 74*   ALKPHOSPHAT 124*   TBILIRUBIN 2.6*   LIPASE 100*   GLUCOSE 96     Recent Labs     01/18/23  1729   APTT 35.7   INR 1.70*     No results for input(s): NTPROBNP in the last 72 hours.      Recent Labs     01/18/23  1729   TROPONINT 10       Imaging:  DX-CHEST-PORTABLE (1 VIEW)   Final Result      Linear bibasilar atelectasis.          X-Ray:  I have personally reviewed the images and compared with prior images.    Assessment/Plan:  Justification for Admission Status  I anticipate this patient will require at least two midnights for appropriate medical management, necessitating inpatient admission because acute GI bleed    Patient will need a Telemetry bed on MEDICAL service .  The need is secondary to acute GI bleed.    * GI bleed- (present on admission)  Assessment & Plan  With hematemesis and melena, likely upper GI source  Continuous cardiac monitoring  NPO  Patient is started on IV Protonix, octreotide and ceftriaxone  Monitor H&H every 8 hours, transfuse for hemoglobin less than 7  GI in the morning for endoscopic evaluation      Decompensated hepatic cirrhosis (HCC)  Assessment & Plan  Secondary to alcoholic cirrhosis  Meld 22  Start IV Lasix and Aldactone once bleeding has improved  Check ammonia    Ascites- (present on admission)  Assessment & Plan  Paracentesis completed in ER with 1.3 L  Check cell count  Albumin has been ordered      Thrombocytopenia (HCC)- (present on admission)  Assessment & Plan  Continue to trend and transfuse if less than 50    Alcohol abuse- (present on admission)  Assessment & Plan  IV thiamine has  been started  Monitor for signs of withdrawal  Check ammonia        VTE prophylaxis: SCDs/TEDs

## 2023-01-19 NOTE — ED PROVIDER NOTES
ER Provider Note    Scribed for Johan Mcghee by Margarita Pratt. 2023  6:24 PM    Primary Care Provider: Tomy Wilde M.D.    CHIEF COMPLAINT  Chief Complaint   Patient presents with    Sent by MD     Transferred from [facility] in [place] for GI consult and resources.     Abdominal Swelling     Hx of alcoholic cirrhosis with ascites, pt gets paracentesis 2x a week in [place].     N/V     Pt presented to ER in [place] with hematemesis. Hx of esophageal varices with recent scope in [place] 6 months ago.      EXTERNAL RECORDS REVIEWED  I reviewed the ED, transfer and EMS notes from [facility] in [place] which showed he was admitted previously with GI bleeding. He has a history of alcohol abuse and alcoholic cirrhosis. Additionally, he has a history of large esophageal varices. He has previously had banding procedures for these. He has a history of thrombocythemia and anemia. Hospital records show he had a Hemoglobin of 10.9 and ETOH of 427 today. He has therapeutic paracentesis every few days. 6 months ago he had an upper endoscopy done. He was on a bolus of 100 mcg of octreotide and IV rocephin. He had a normal creatine and a bilirubin of 2.5. He had a MELD score of 17 at the time. He was stable from outside facility and transferred here for GI. No leukocytosis. Platelets were 103.     HPI/ROS  LIMITATION TO HISTORY   Select: : None  OUTSIDE HISTORIAN(S):  None    Jimbo Santacruz is a 47 y.o. male who presents to the ED as a transfer for GI complaining of hematemesis. He states he has been dry heaving without any vomiting. He endorses associated shortness of breath, bilateral leg swelling and testicle swelling with standing. The patient states his close friend  yesterday and it caused him to drink alcohol for the first time in 5 years. He notes having difficulty ambulating secondary to his symptoms. He denies any diarrhea, chest pain, dark, black or tarry stools. He notes he had 2 bowel movements this  morning which were normal. He denies any drug or tobacco use. He takes his normal regimen of 40 mg Lasix daily.     PAST MEDICAL HISTORY  Past Medical History:   Diagnosis Date    Anemia     Cirrhosis (HCC)     ETOH abuse     GI bleed     Liver disease     Pancreatitis        SURGICAL HISTORY  Past Surgical History:   Procedure Laterality Date    GASTROSCOPY-ENDO  9/27/2019    Procedure: GASTROSCOPY;  Surgeon: Roberto Hankins M.D.;  Location: ENDOSCOPY [facility];  Service: Gastroenterology    GASTROSCOPY-ENDO N/A 4/13/2019    Procedure: GASTROSCOPY with Banding;  Surgeon: Jose Andrade D.O.;  Location: SURGERY [facility];  Service: Gastroenterology    HERNIA REPAIR      OTHER      right leg fx repair       FAMILY HISTORY  Family History   Problem Relation Age of Onset    Diabetes Father        SOCIAL HISTORY   reports that he has never smoked. He has never used smokeless tobacco. He reports current alcohol use. He reports that he does not use drugs.    CURRENT MEDICATIONS  Previous Medications    LACTULOSE 10 GM/15ML SOLUTION    Take 20 g by mouth 3 times a day. 15-30 mL BID    MAGNESIUM OXIDE (MAG-OX) 400 MG TAB TABLET    Take 1 Tab by mouth 2 Times a Day.    OMEPRAZOLE (PRILOSEC) 40 MG DELAYED-RELEASE CAPSULE    Take 1 Cap by mouth 2 Times a Day.    PROPRANOLOL (INDERAL) 10 MG TAB    Take 1 Tab by mouth every 8 hours. Hold for SBP <100  or DBP < 55       ALLERGIES  Patient has no known allergies.    PHYSICAL EXAM  Vitals:    01/18/23 1902 01/18/23 1959 01/18/23 2029 01/18/23 2059   BP: 116/75 125/69 126/77 116/63   Pulse: (!) 111 100 92 95   Resp:    16   Temp:       TempSrc:       SpO2: 97% 99% 100% 97%   Weight:       Height:         Vitals: My interpretation: normotensive, tachycardic, afebrile, not hypoxic    Reinterpretation of vitals: Improved    Constitutional: Well developed, Well nourished, No acute distress, Non-toxic appearance.   HENT: Normocephalic, Atraumatic, Bilateral external ears  normal, Oropharynx is clear mucous membranes are moist. No oral exudates or nasal discharge.   Eyes: Pupils are equal round and reactive, EOMI, Conjunctiva normal, No discharge.   Neck: Normal range of motion, No tenderness, Supple, No stridor. No meningismus.  Lymphatic: No lymphadenopathy noted.   Cardiovascular: Regular rate and rhythm without murmur rub or gallop.  Thorax & Lungs: Clear breath sounds bilaterally without wheezes, rhonchi or rales. There is no chest wall tenderness.   Abdomen: Significantly distended, tight abdomen, minimally tender in all quadrants, large volume of ascites fluid in the abdomen of left lower quadrant. There is no rebound or guarding. No organomegaly is appreciated. Bowel sounds are normal.  Skin: Normal without rash.   Back: No CVA or spinal tenderness.   Extremities: Intact distal pulses, No edema, No tenderness, No cyanosis, No clubbing. Capillary refill is less than 2 seconds.  Musculoskeletal: Good range of motion in all major joints. No tenderness to palpation or major deformities noted.   Neurologic: Alert & oriented x 3, Normal motor function, Normal sensory function, No focal deficits noted. Reflexes are normal.  Psychiatric: Affect normal, Judgment normal, Mood normal. There is no suicidal ideation or patient reported hallucinations.     DIAGNOSTIC STUDIES    Labs:   Results for orders placed or performed during the hospital encounter of 01/18/23   CBC WITH DIFFERENTIAL   Result Value Ref Range    WBC 4.9 4.8 - 10.8 K/uL    RBC 3.35 (L) 4.70 - 6.10 M/uL    Hemoglobin 9.5 (L) 14.0 - 18.0 g/dL    Hematocrit 28.5 (L) 42.0 - 52.0 %    MCV 85.1 81.4 - 97.8 fL    MCH 28.4 27.0 - 33.0 pg    MCHC 33.3 (L) 33.7 - 35.3 g/dL    RDW 52.7 (H) 35.9 - 50.0 fL    Platelet Count 76 (L) 164 - 446 K/uL    MPV 10.6 9.0 - 12.9 fL    Neutrophils-Polys 74.60 (H) 44.00 - 72.00 %    Lymphocytes 15.60 (L) 22.00 - 41.00 %    Monocytes 6.40 0.00 - 13.40 %    Eosinophils 1.40 0.00 - 6.90 %     Basophils 1.60 0.00 - 1.80 %    Immature Granulocytes 0.40 0.00 - 0.90 %    Nucleated RBC 0.00 /100 WBC    Neutrophils (Absolute) 3.64 1.82 - 7.42 K/uL    Lymphs (Absolute) 0.76 (L) 1.00 - 4.80 K/uL    Monos (Absolute) 0.31 0.00 - 0.85 K/uL    Eos (Absolute) 0.07 0.00 - 0.51 K/uL    Baso (Absolute) 0.08 0.00 - 0.12 K/uL    Immature Granulocytes (abs) 0.02 0.00 - 0.11 K/uL    NRBC (Absolute) 0.00 K/uL   COMP METABOLIC PANEL   Result Value Ref Range    Sodium 130 (L) 135 - 145 mmol/L    Potassium 5.2 3.6 - 5.5 mmol/L    Chloride 103 96 - 112 mmol/L    Co2 13 (L) 20 - 33 mmol/L    Anion Gap 14.0 7.0 - 16.0    Glucose 96 65 - 99 mg/dL    Bun 14 8 - 22 mg/dL    Creatinine 0.68 0.50 - 1.40 mg/dL    Calcium 7.9 (L) 8.5 - 10.5 mg/dL    AST(SGOT) 74 (H) 12 - 45 U/L    ALT(SGPT) 25 2 - 50 U/L    Alkaline Phosphatase 124 (H) 30 - 99 U/L    Total Bilirubin 2.6 (H) 0.1 - 1.5 mg/dL    Albumin 3.0 (L) 3.2 - 4.9 g/dL    Total Protein 7.2 6.0 - 8.2 g/dL    Globulin 4.2 (H) 1.9 - 3.5 g/dL    A-G Ratio 0.7 g/dL   LIPASE   Result Value Ref Range    Lipase 100 (H) 11 - 82 U/L   TROPONIN   Result Value Ref Range    Troponin T 10 6 - 19 ng/L   LACTIC ACID   Result Value Ref Range    Lactic Acid 3.8 (H) 0.5 - 2.0 mmol/L   PROTHROMBIN TIME (INR)   Result Value Ref Range    PT 19.6 (H) 12.0 - 14.6 sec    INR 1.70 (H) 0.87 - 1.13   APTT   Result Value Ref Range    APTT 35.7 24.7 - 36.0 sec   COD (ADULT)   Result Value Ref Range    ABO Grouping Only A     Rh Grouping Only POS     Antibody Screen-Cod NEG    DIAGNOSTIC ALCOHOL   Result Value Ref Range    Diagnostic Alcohol 214.5 (H) <10.1 mg/dL   CORRECTED CALCIUM   Result Value Ref Range    Correct Calcium 8.7 8.5 - 10.5 mg/dL   ESTIMATED GFR   Result Value Ref Range    GFR (CKD-EPI) 116 >60 mL/min/1.73 m 2   EKG (NOW)   Result Value Ref Range    Report       [facility] Emergency Dept[facility]    Test Date:  2023-01-18  Pt Name:    JHON TURNER              Department: ER  MRN:         [id number]                      Room:       [facility]  Gender:     Male                         Technician: [id number]  :        [id number]                   Requested By:MARTA NOLEN  Order #:    [id number]                    Reading MD: Marta Nolen    Measurements  Intervals                                Axis  Rate:       107                          P:          23  AK:         129                          QRS:        18  QRSD:       89                           T:          28  QT:         343  QTc:        458    Interpretive Statements  Sinus tachycardia  Abnormal R-wave progression, early transition  Baseline wander in lead(s) V1  Compared to ECG 10/19/2020 07:45:10  Atrial fibrillation no longer present  Atrial flutter no longer present  Intraventricular conduction delay no longer present  Electronically Signed On 2023 19:31:05 PST  by Marta Nolen       All labs reviewed by myself. Significant for no leukocytosis, mild anemia, thrombocytopenia, hyponatremia, bicarb 13, normal glucose, normal renal function, transaminitis with AST predominance, bilirubin is elevated, lipase of 100, troponin negative, lactic of 3.8, PT/INR are elevated    Radiology:   The attending emergency physician has independently interpreted the diagnostic imaging associated with this visit and am waiting the final reading from the radiologist.   DX-CHEST-PORTABLE (1 VIEW)   Final Result      Linear bibasilar atelectasis.         Paracentesis Procedure    Indication: Ascites    Consent: The patient was counseled regarding the procedure, it's indications, risks, potential complications and alternatives and any questions were answered. Consent was obtained.    Procedure: The patient was placed in the supine position with the head of the bed slightly elevated and the appropriate landmarks were identified. The skin over the puncture site in the left lower quadrant region was prepped with chlorhexidine. Local anesthesia was  obtained by infiltration using 1% Lidocaine without epinephrine. A paracentesis catheter was then advanced into the abdominal cavity over a needle and the needle was withdrawn. Fluid was returned which was straw-colored.  A total volume of 8 liters was withdrawn. The catheter was then withdrawn and a sterile dressing was placed over the site.     The patient tolerated the procedure well.    Complications: None    COURSE & MEDICAL DECISION MAKING     6:44 PM I discussed the patient's case and the above findings with Dr. Clark (GI) who will consult on the patient.     7:42 PM  Paracentesis procedure performed as detailed above.    7:10 PM - Bedside ultrasound Independently interpretted by myself and shows a large volume of ascites. He has a large volume of ascites fluid in the abdomen of left lower quadrant.    7:17 PM - I discussed the patient's case and the above findings with Dr. Dr. Hill (Hospitalist) who will accept the patient for hospitalization.     INITIAL ASSESSMENT AND PLAN    MDM: 8:06 PM Jimbo Santacruz is a 47 y.o. male who presented with transfer for hematemesis likely secondary to esophageal varices with chronic alcohol abuse, liver failure and cirrhosis, meld score 17 at outside facility.  Transferred here hemodynamically stable for GI consultation, admission and likely endoscopy.  Patient has had banding procedures in the past successfully.  Upon arrival here he is uncomfortable from abdominal distention but his vital signs are fairly unremarkable.  He is intoxicated and drink heavily today and yesterday.  He underwent repeat evaluation here but has no recurrent hematemesis.  He underwent a therapeutic paracentesis with 8 L taken off.  Discussed with GI, started octreotide, Protonix, ceftriaxone and they will take patient to endoscopy in the morning unless he becomes destabilized overnight.   All labs reviewed by myself. Significant for no leukocytosis, mild anemia, thrombocytopenia,  hyponatremia, bicarb 13, normal glucose, normal renal function, transaminitis with AST predominance, bilirubin is elevated, lipase of 100, troponin negative, lactic of 3.8, PT/INR are elevated.  Patient's hemoglobin has dropped by about one-point, however he remained stable and no negation for blood transfusion at this time although this was considered.  EKG is without ischemic changes.  Chest x-ray within normal limits.  Heart score is low.  Patient feels significantly improved after paracentesis.  We will plan to admit to the hospitalist, who after discussed with, is amenable to admission process.  Patient resting comfortably, admission.  He is critical but stable.  This is an acute exacerbation of a chronic illness.  Care was gathered from EMS report and prior ED records were reviewed by myself.  Chest x-ray is interpreted by myself and independently.    ADDITIONAL PROBLEM LIST AND DISPOSITION    HYDRATION: Based on the patient's presentation of Dehydration the patient was given IV fluids. IV Hydration was used because oral hydration was not adequate alone. Upon recheck following hydration, the patient was improved.    CRITICAL CARE TIME 35 minutes: Acute GI bleed, esophageal varices, consultation multiple specialist, frequent reevaluation  There was a very real possibility of deterioration of the patient's condition.  This patient required the highest level of care.  I provided critical care services which included: review of the medical record, treatment orders, ordering and reviewing test results, frequent reevaluation of the patient's condition and response to treatment, as well as discussing the case with appropriate personnel and various consultants. The critical care time associated with the care of this patient is exclusive of any procedures or specific interventions.    DISPOSITION:  Patient will be hospitalized by Dr. Hill  in guarded condition.    FINAL DIANGOSIS  1. Gastrointestinal hemorrhage,  unspecified gastrointestinal hemorrhage type Acute   2. Secondary esophageal varices with bleeding (HCC) Acute   3. Sinus tachycardia Acute   4. Alcohol abuse Acute   5. Dehydration Acute   6. Ascites due to alcoholic cirrhosis (HCC) Acute   7. Transaminitis Acute      The note accurately reflects work and decisions made by me.  Johan Mcghee  1/18/2023  9:45 PM

## 2023-01-19 NOTE — PROGRESS NOTES
Telemetry Report:    Rhythm: sinus tachycardia  Hear Rate:  bpm    NE: 0.16 sec  QRS: 0.086sec  QT: 0.324 sec      Per telemetry room monitor

## 2023-01-19 NOTE — ANESTHESIA POSTPROCEDURE EVALUATION
Patient: Jimbo Santacruz    Procedure Summary     Date: 01/19/23 Room / Location: [facility] ROOM 26 / SURGERY SAME DAY [facility]    Anesthesia Start: 1412 Anesthesia Stop: 1439    Procedures:       GASTROSCOPY (Esophagus)      GASTROSCOPY, WITH VARICEAL BANDING (Esophagus) Diagnosis: (Esophageal Varices )    Surgeons: Ivon Clark M.D. Responsible Provider: Prashant Hill M.D.    Anesthesia Type: general ASA Status: 3 - Emergent          Final Anesthesia Type: general  Last vitals  BP   Blood Pressure: 112/67    Temp   36.9 °C (98.4 °F)    Pulse   89   Resp   18    SpO2   98 %      Anesthesia Post Evaluation    Patient location during evaluation: PACU  Patient participation: complete - patient participated  Level of consciousness: awake and alert  Pain score: 2    Airway patency: patent  Anesthetic complications: no  Cardiovascular status: hemodynamically stable  Respiratory status: acceptable  Hydration status: euvolemic    PONV: none          No notable events documented.     Nurse Pain Score: 0 (NPRS)

## 2023-01-19 NOTE — ANESTHESIA TIME REPORT
Anesthesia Start and Stop Event Times     Date Time Event    1/19/2023 1356 Ready for Procedure     1412 Anesthesia Start     1439 Anesthesia Stop        Responsible Staff  01/19/23    Name Role Begin End    Prashant Hill M.D. Anesth 1412 1433        Overtime Reason:  no overtime (within assigned shift)    Comments:

## 2023-01-19 NOTE — ED NOTES
Med Rec Complete per patient  Allergies Reviewed with patient  No antibiotics within the last 30 days  Patient's Preferred Pharmacy:  [facility] [place]   ([facility])                or         [facility] in [place], [place]          Patient would like to receive medications via Meds to Beds if receiving prescriptions upon discharge..

## 2023-01-19 NOTE — ED TRIAGE NOTES
"Chief Complaint   Patient presents with    Sent by MD     Transferred from [facility] in [place] for GI consult and resources.     Abdominal Swelling     Hx of alcoholic cirrhosis with ascites, pt gets paracentesis 2x a week in [place].     N/V     Pt presented to ER in [place] with hematemesis. Hx of esophageal varices with recent scope in [place] 6 months ago.      Pt BIB  Reach from [place], [place] for above complaint. Pt has hx of chronic alcohol related cirrhosis with ascites. Pt last drink was 1/17 2200. Pt received 100mcg octreotide and rocephin prior to arrival.  No hematemesis since last night.     /78   Pulse (!) 110   Temp 37.1 °C (98.8 °F) (Temporal)   Resp 18   Ht 1.727 m (5' 8\")   Wt 79.3 kg (174 lb 13.2 oz)   SpO2 98%   BMI 26.58 kg/m²     "

## 2023-01-19 NOTE — PROCEDURES
OPERATIVE REPORT    PATIENT:   Jimbo Santacruz   1975       PREOPERATIVE DIAGNOSES/INDICATIONS: Decompensated cirrhosis, history of varices    POSTOPERATIVE DIAGNOSIS: medium sized esophageal varices, s/p banding x 6, portal hypertensive gastropathy    PROCEDURE:  ESOPHAGOGASTRODUODENOSCOPY with variceal band ligation    PHYSICIAN:  Ivon Clark MD    ANESTHESIA:  Per anesthesiologist.    LOCATION: [facility]    CONSENT:  OBTAINED. The risks, benefits and alternatives of the procedure were discussed in details. The risks include and are not limited to bleeding, infection, perforation, missed lesions, and sedations risks (cardiopulmonary compromise and allergic reaction to medications).    DESCRIPTION: The patient presented to the procedure room.  After routine checkup was performed, patient was brought into the endoscopy suite.  Patient was placed on his left lateral decubitus position. A bite block was placed in patient's mouth. Patient was sedated by anesthesia.  Vital signs were monitored throughout the procedure.  Oxygenation support was provided throughout the procedure. Upper endoscope was inserted into patient's mouth and advanced to the second portion of the duodenum under direct visualization.      Once the site was reached and examined, the upper endoscope was withdrawn.  Retroflexion was made within the stomach.  The stomach was decompressed, scope was withdrawn and the procedure was terminated.     The patient tolerated the procedure well.  There were no immediate complications.    OPERATIVE FINDINGS:    1. Esophagus: 3 columns of medium sized varices with red nicholas, no active bleeding, 6 bands placed and 1 misfired  2. Stomach: vascular ectasia predominantly in fundus, no blood in lumen  3. Duodenum: normal    IMPRESSION/RECOMMENDATIONS:    Follow up with outpatient GI for repeat EGD in 4-6 weeks  Dc propranolol  Lasix 40 mg + spironolactone 100 mg daily  Signing off.

## 2023-01-19 NOTE — OR NURSING
*This is a Running Note*    1437 Pt to PACU 01 from OR. Bedside report from anesthesiologist and RN.  Attached to monitoring, VSS, breathing is calm and unlabored. Pt denies pain or nausea.  Remains on 6 L oxygen via mask with an Oral airway in place.      1438 Oral Airway out     1443 Pt Now on RA.    1515 Criteria met to transition patient to phase 2 recovery. Called report to HEENA Wasserman, and called tele 8 monitors to verif pt can be seen. They verified yes. Pt is on for transport. Paged MD Clark for updated Diet. Pt complaining of abd pain, heat packs and warm blankets provided.    1524 MD at bedside, updated pt and gave a verbal on Diet orders.  1530 Pt is on transport with Tele monitor. Called RN to update her pt in route.

## 2023-01-19 NOTE — CONSULTS
"Gastroenterology Initial Consult Note               Author:  Ivon Clark M.D. Date & Time Created: 1/19/2023 10:16 AM       Patient ID:  Name:             Jimbo Santacruz    YOB: 1975  Age:                 47 y.o.  male  MRN:               [id number]      Referring Provider:  Dr. Mcghee        Presenting Chief Complaint:  Hematemesis      History of Present Illness:    This is a 47 y.o. male with decompensated alcoholic cirrhosis, history of ascites, was transferred from hospital in [place].  He has been getting frequent paracenteses.  He presented to the OSH with hematemesis and has history of esophageal varices.  He has not had an endoscopy for a few years.  His ETOH level yesterday was 427.  Patient has propranolol listed as a medication and states he takes a medication that is 20 mg.  He does not know the name of his medicaitons.  States he is compliant with low sodium diet.  Has an appointment possibly with [facility] in February.  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        Review of Systems:  Review of Systems   Constitutional:  Positive for malaise/fatigue. Negative for chills and fever.   HENT: Negative.     Eyes: Negative.    Respiratory: Negative.     Cardiovascular: Negative.    Gastrointestinal:  Positive for blood in stool and vomiting.   Genitourinary: Negative.    Musculoskeletal: Negative.    Skin:  Negative for itching and rash.   Neurological: Negative.            Past Medical History:  Past Medical History:   Diagnosis Date    Anemia     Cirrhosis (HCC)     ETOH abuse     GI bleed     Liver disease     Pancreatitis      Active Hospital Problems    Diagnosis     Decompensated hepatic cirrhosis (HCC) [K72.90, K74.60]     Ascites [R18.8]     GI bleed [K92.2]     Thrombocytopenia (HCC) [D69.6]     Alcohol abuse [F10.10]          Past Surgical History:  Past Surgical History:   Procedure Laterality Date    GASTROSCOPY-ENDO  9/27/2019    Procedure: GASTROSCOPY;  Surgeon: Roberto HENLEY" JAY JAY Hankins;  Location: ENDOSCOPY [facility];  Service: Gastroenterology    GASTROSCOPY-ENDO N/A 4/13/2019    Procedure: GASTROSCOPY with Banding;  Surgeon: Jose Andrade D.O.;  Location: SURGERY [facility];  Service: Gastroenterology    HERNIA REPAIR      OTHER      right leg fx repair           Hospital Medications:  Current Facility-Administered Medications   Medication Dose Frequency Provider Last Rate Last Admin    pantoprazole (Protonix) injection 40 mg  40 mg BID Samm Olson M.D.        phytonadione (MEPHYTON) tablet 5 mg  5 mg Once aSmm Olson M.D.        lactulose 20 GM/30ML solution 30 mL  30 mL BID Samm Olson M.D.        octreotide (Sandostatin) 1,250 mcg in  mL Infusion  50 mcg/hr Continuous Johan Mcghee 10 mL/hr at 01/18/23 2029 50 mcg/hr at 01/18/23 2029    cefTRIAXone (Rocephin) syringe 1,000 mg  1,000 mg Q24HRS Joleen Hill M.D.        Pharmacy Consult Request   PHARMACY TO DOSE Joleen Hill M.D.        ondansetron (ZOFRAN) syringe/vial injection 4 mg  4 mg Q4HRS PRN Joleen Hill M.D.        ondansetron (ZOFRAN ODT) dispertab 4 mg  4 mg Q4HRS PRN Joleen Hill M.D.        promethazine (PHENERGAN) tablet 12.5-25 mg  12.5-25 mg Q4HRS PRN Joleen Hill M.D.        promethazine (PHENERGAN) suppository 12.5-25 mg  12.5-25 mg Q4HRS PRN Joleen Hill M.D.        prochlorperazine (COMPAZINE) injection 5-10 mg  5-10 mg Q4HRS PRN Joleen Hill M.D.        thiamine (B-1) IVPB 100 mg in 100 mL D5W (premix)  100 mg DAILY Joleen Hill M.D.   Stopped at 01/19/23 0546   Last reviewed on 1/18/2023  6:59 PM by Salma Guan       Current Outpatient Medications:  Medications Prior to Admission   Medication Sig Dispense Refill Last Dose    propranolol (INDERAL) 10 MG Tab Take 1 Tab by mouth every 8 hours. Hold for SBP <100  or DBP < 55 90 Tab 11 1/17/2023 at AM    magnesium oxide (MAG-OX) 400 MG Tab tablet Take 1 Tab by mouth 2 Times a Day. 30 Tab 2 1/17/2023 at AM     "omeprazole (PRILOSEC) 40 MG delayed-release capsule Take 1 Cap by mouth 2 Times a Day. 60 Cap 3 1/17/2023 at AM    lactulose 10 GM/15ML Solution Take 20 g by mouth 3 times a day. 15-30 mL BID   1/17/2023 at PM         Medication Allergies:  No Known Allergies      Family Medical History:  Family History   Problem Relation Age of Onset    Diabetes Father          Social History:  Social History     Socioeconomic History    Marital status:      Spouse name: Not on file    Number of children: Not on file    Years of education: Not on file    Highest education level: Not on file   Occupational History    Not on file   Tobacco Use    Smoking status: Never    Smokeless tobacco: Never   Vaping Use    Vaping Use: Never used   Substance and Sexual Activity    Alcohol use: Yes     Comment: daily    Drug use: No    Sexual activity: Not on file   Other Topics Concern    Not on file   Social History Narrative    Not on file     Social Determinants of Health     Financial Resource Strain: Not on file   Food Insecurity: Not on file   Transportation Needs: Not on file   Physical Activity: Not on file   Stress: Not on file   Social Connections: Not on file   Intimate Partner Violence: Not on file   Housing Stability: Not on file         Vital signs:  Weight/BMI: Body mass index is 26.59 kg/m².  /78   Pulse 98   Temp 36.9 °C (98.5 °F) (Temporal)   Resp 20   Ht 1.727 m (5' 7.99\")   Wt 79.3 kg (174 lb 13.2 oz)   SpO2 99%   Vitals:    01/19/23 0356 01/19/23 0819 01/19/23 0915 01/19/23 0930   BP: 109/60 117/68 138/79 134/78   Pulse: (!) 102 (!) 103 97 98   Resp: 18 (!) 22 18 20   Temp: 36.9 °C (98.4 °F) 36.8 °C (98.3 °F) 37.1 °C (98.8 °F) 36.9 °C (98.5 °F)   TempSrc: Temporal Temporal  Temporal   SpO2: 99% 100% 99% 99%   Weight:       Height:         Oxygen Therapy:  Pulse Oximetry: 99 %, O2 (LPM): 0, O2 Delivery Device: None - Room Air  No intake or output data in the 24 hours ending 01/19/23 1016      Physical " Exam:  Physical Exam  Constitutional:       Comments: Sarcopenic with large abdomen   HENT:      Head: Normocephalic and atraumatic.      Nose: Nose normal.      Mouth/Throat:      Mouth: Mucous membranes are moist.   Eyes:      Pupils: Pupils are equal, round, and reactive to light.   Cardiovascular:      Rate and Rhythm: Normal rate and regular rhythm.   Pulmonary:      Effort: Pulmonary effort is normal.      Breath sounds: Normal breath sounds.   Abdominal:      General: Bowel sounds are normal. There is distension.      Palpations: Abdomen is soft.   Musculoskeletal:         General: Normal range of motion.      Cervical back: Neck supple.   Skin:     General: Skin is warm and dry.   Neurological:      General: No focal deficit present.      Mental Status: He is alert.      Comments: No asterixis               Labs:  Recent Labs     01/18/23 1729 01/18/23 2146 01/19/23  0356   SODIUM 130*  --  125*   POTASSIUM 5.2  --  5.5   CHLORIDE 103  --  100   CO2 13*  --  14*   BUN 14  --  16   CREATININE 0.68  --  0.70   MAGNESIUM  --  1.6  --    CALCIUM 7.9*  --  7.6*     Recent Labs     01/18/23 1729 01/19/23  0356   ALTSGPT 25 19   ASTSGOT 74* 53*   ALKPHOSPHAT 124* 94   TBILIRUBIN 2.6* 2.3*   LIPASE 100*  --    GLUCOSE 96 167*     Recent Labs     01/18/23 1729 01/19/23  0356   WBC 4.9 2.4*   NEUTSPOLYS 74.60* 67.70   LYMPHOCYTES 15.60* 18.30*   MONOCYTES 6.40 11.20   EOSINOPHILS 1.40 1.20   BASOPHILS 1.60 1.20   ASTSGOT 74* 53*   ALTSGPT 25 19   ALKPHOSPHAT 124* 94   TBILIRUBIN 2.6* 2.3*     Recent Labs     01/18/23 1729 01/18/23 2146 01/19/23  0356   RBC 3.35*  --  2.64*   HEMOGLOBIN 9.5* 10.2* 7.6*   HEMATOCRIT 28.5*  --  22.1*   PLATELETCT 76*  --  40*   PROTHROMBTM 19.6*  --   --    APTT 35.7  --   --    INR 1.70*  --   --      Recent Results (from the past 24 hour(s))   COD (ADULT)    Collection Time: 01/18/23  5:25 PM   Result Value Ref Range    ABO Grouping Only A     Rh Grouping Only POS     Antibody  Screen-Cod NEG    CBC WITH DIFFERENTIAL    Collection Time: 01/18/23  5:29 PM   Result Value Ref Range    WBC 4.9 4.8 - 10.8 K/uL    RBC 3.35 (L) 4.70 - 6.10 M/uL    Hemoglobin 9.5 (L) 14.0 - 18.0 g/dL    Hematocrit 28.5 (L) 42.0 - 52.0 %    MCV 85.1 81.4 - 97.8 fL    MCH 28.4 27.0 - 33.0 pg    MCHC 33.3 (L) 33.7 - 35.3 g/dL    RDW 52.7 (H) 35.9 - 50.0 fL    Platelet Count 76 (L) 164 - 446 K/uL    MPV 10.6 9.0 - 12.9 fL    Neutrophils-Polys 74.60 (H) 44.00 - 72.00 %    Lymphocytes 15.60 (L) 22.00 - 41.00 %    Monocytes 6.40 0.00 - 13.40 %    Eosinophils 1.40 0.00 - 6.90 %    Basophils 1.60 0.00 - 1.80 %    Immature Granulocytes 0.40 0.00 - 0.90 %    Nucleated RBC 0.00 /100 WBC    Neutrophils (Absolute) 3.64 1.82 - 7.42 K/uL    Lymphs (Absolute) 0.76 (L) 1.00 - 4.80 K/uL    Monos (Absolute) 0.31 0.00 - 0.85 K/uL    Eos (Absolute) 0.07 0.00 - 0.51 K/uL    Baso (Absolute) 0.08 0.00 - 0.12 K/uL    Immature Granulocytes (abs) 0.02 0.00 - 0.11 K/uL    NRBC (Absolute) 0.00 K/uL   COMP METABOLIC PANEL    Collection Time: 01/18/23  5:29 PM   Result Value Ref Range    Sodium 130 (L) 135 - 145 mmol/L    Potassium 5.2 3.6 - 5.5 mmol/L    Chloride 103 96 - 112 mmol/L    Co2 13 (L) 20 - 33 mmol/L    Anion Gap 14.0 7.0 - 16.0    Glucose 96 65 - 99 mg/dL    Bun 14 8 - 22 mg/dL    Creatinine 0.68 0.50 - 1.40 mg/dL    Calcium 7.9 (L) 8.5 - 10.5 mg/dL    AST(SGOT) 74 (H) 12 - 45 U/L    ALT(SGPT) 25 2 - 50 U/L    Alkaline Phosphatase 124 (H) 30 - 99 U/L    Total Bilirubin 2.6 (H) 0.1 - 1.5 mg/dL    Albumin 3.0 (L) 3.2 - 4.9 g/dL    Total Protein 7.2 6.0 - 8.2 g/dL    Globulin 4.2 (H) 1.9 - 3.5 g/dL    A-G Ratio 0.7 g/dL   LIPASE    Collection Time: 01/18/23  5:29 PM   Result Value Ref Range    Lipase 100 (H) 11 - 82 U/L   TROPONIN    Collection Time: 01/18/23  5:29 PM   Result Value Ref Range    Troponin T 10 6 - 19 ng/L   LACTIC ACID    Collection Time: 01/18/23  5:29 PM   Result Value Ref Range    Lactic Acid 3.8 (H) 0.5 - 2.0 mmol/L    PROTHROMBIN TIME (INR)    Collection Time: 23  5:29 PM   Result Value Ref Range    PT 19.6 (H) 12.0 - 14.6 sec    INR 1.70 (H) 0.87 - 1.13   APTT    Collection Time: 23  5:29 PM   Result Value Ref Range    APTT 35.7 24.7 - 36.0 sec   DIAGNOSTIC ALCOHOL    Collection Time: 23  5:29 PM   Result Value Ref Range    Diagnostic Alcohol 214.5 (H) <10.1 mg/dL   CORRECTED CALCIUM    Collection Time: 23  5:29 PM   Result Value Ref Range    Correct Calcium 8.7 8.5 - 10.5 mg/dL   ESTIMATED GFR    Collection Time: 23  5:29 PM   Result Value Ref Range    GFR (CKD-EPI) 116 >60 mL/min/1.73 m 2   BLOOD CULTURE    Collection Time: 23  6:13 PM    Specimen: Peripheral; Blood   Result Value Ref Range    Significant Indicator NEG     Source BLD     Site PERIPHERAL     Culture Result       No Growth  Note: Blood cultures are incubated for 5 days and  are monitored continuously.Positive blood cultures  are called to the RN and reported as soon as  they are identified.     BLOOD CULTURE    Collection Time: 23  6:20 PM    Specimen: Peripheral; Blood   Result Value Ref Range    Significant Indicator NEG     Source BLD     Site PERIPHERAL     Culture Result       No Growth  Note: Blood cultures are incubated for 5 days and  are monitored continuously.Positive blood cultures  are called to the RN and reported as soon as  they are identified.     EKG (NOW)    Collection Time: 23  6:43 PM   Result Value Ref Range    Report       [facility] Emergency Dept.    Test Date:  2023  Pt Name:    JHON TURNER              Department: ER  MRN:        [id number]                      Room:       [facility]  Gender:     Male                         Technician: [id number]  :        1975                   Requested By:MARTA NOLEN  Order #:    [id number]                    Reading MD: Marta Nolen    Measurements  Intervals                                Axis  Rate:       107                           P:          23  WI:         129                          QRS:        18  QRSD:       89                           T:          28  QT:         343  QTc:        458    Interpretive Statements  Sinus tachycardia  Abnormal R-wave progression, early transition  Baseline wander in lead(s) V1  Compared to ECG 10/19/2020 07:45:10  Atrial fibrillation no longer present  Atrial flutter no longer present  Intraventricular conduction delay no longer present  Electronically Signed On 1- 19:31:05 PST  by Johan Mcghee     Fluid Total Protein    Collection Time: 01/18/23  8:21 PM   Result Value Ref Range    Fluid Type Peritoneal     Total Protein Fluid 0.8 g/dL   Magnesium    Collection Time: 01/18/23  9:46 PM   Result Value Ref Range    Magnesium 1.6 1.5 - 2.5 mg/dL   HGB (Hemoglobin) for 48 hours    Collection Time: 01/18/23  9:46 PM   Result Value Ref Range    Hemoglobin 10.2 (L) 14.0 - 18.0 g/dL   AMMONIA    Collection Time: 01/18/23  9:46 PM   Result Value Ref Range    Ammonia 19 11 - 45 umol/L   Fluid Cell Count w/Diff    Collection Time: 01/18/23 10:57 PM   Result Value Ref Range    Fluid Type Peritoneal     Color-Body Fluid Yellow     Character-Body Fluid Clear     Total RBC Count <2000 cells/uL    Total WBC 45 cells/uL    Polys 4 %    Lymphs 20 %    Mononuclear Cells - Fluid 76 %   Fluid Culture with Gram Stain    Collection Time: 01/18/23 10:57 PM    Specimen: Peritoneal Fluid; Body Fluid   Result Value Ref Range    Significant Indicator NEG     Source BF     Site PERITONEAL FLUID     Culture Result -     Gram Stain Result No organisms seen.    FLUID ALBUMIN    Collection Time: 01/18/23 10:57 PM   Result Value Ref Range    Albumin 0.4 g/dL   GRAM STAIN    Collection Time: 01/18/23 10:57 PM    Specimen: Body Fluid   Result Value Ref Range    Significant Indicator .     Source BF     Site PERITONEAL FLUID     Gram Stain Result No organisms seen.    CBC with Differential    Collection  Time: 01/19/23  3:56 AM   Result Value Ref Range    WBC 2.4 (L) 4.8 - 10.8 K/uL    RBC 2.64 (L) 4.70 - 6.10 M/uL    Hemoglobin 7.6 (L) 14.0 - 18.0 g/dL    Hematocrit 22.1 (L) 42.0 - 52.0 %    MCV 83.7 81.4 - 97.8 fL    MCH 28.8 27.0 - 33.0 pg    MCHC 34.4 33.7 - 35.3 g/dL    RDW 49.9 35.9 - 50.0 fL    Platelet Count 40 (L) 164 - 446 K/uL    MPV 12.2 9.0 - 12.9 fL    Neutrophils-Polys 67.70 44.00 - 72.00 %    Lymphocytes 18.30 (L) 22.00 - 41.00 %    Monocytes 11.20 0.00 - 13.40 %    Eosinophils 1.20 0.00 - 6.90 %    Basophils 1.20 0.00 - 1.80 %    Immature Granulocytes 0.40 0.00 - 0.90 %    Nucleated RBC 0.00 /100 WBC    Neutrophils (Absolute) 1.63 (L) 1.82 - 7.42 K/uL    Lymphs (Absolute) 0.44 (L) 1.00 - 4.80 K/uL    Monos (Absolute) 0.27 0.00 - 0.85 K/uL    Eos (Absolute) 0.03 0.00 - 0.51 K/uL    Baso (Absolute) 0.03 0.00 - 0.12 K/uL    Immature Granulocytes (abs) 0.01 0.00 - 0.11 K/uL    NRBC (Absolute) 0.00 K/uL   Comp Metabolic Panel (CMP)    Collection Time: 01/19/23  3:56 AM   Result Value Ref Range    Sodium 125 (L) 135 - 145 mmol/L    Potassium 5.5 3.6 - 5.5 mmol/L    Chloride 100 96 - 112 mmol/L    Co2 14 (L) 20 - 33 mmol/L    Anion Gap 11.0 7.0 - 16.0    Glucose 167 (H) 65 - 99 mg/dL    Bun 16 8 - 22 mg/dL    Creatinine 0.70 0.50 - 1.40 mg/dL    Calcium 7.6 (L) 8.5 - 10.5 mg/dL    AST(SGOT) 53 (H) 12 - 45 U/L    ALT(SGPT) 19 2 - 50 U/L    Alkaline Phosphatase 94 30 - 99 U/L    Total Bilirubin 2.3 (H) 0.1 - 1.5 mg/dL    Albumin 3.1 (L) 3.2 - 4.9 g/dL    Total Protein 6.3 6.0 - 8.2 g/dL    Globulin 3.2 1.9 - 3.5 g/dL    A-G Ratio 1.0 g/dL   CORRECTED CALCIUM    Collection Time: 01/19/23  3:56 AM   Result Value Ref Range    Correct Calcium 8.3 (L) 8.5 - 10.5 mg/dL   ESTIMATED GFR    Collection Time: 01/19/23  3:56 AM   Result Value Ref Range    GFR (CKD-EPI) 115 >60 mL/min/1.73 m 2   PLATELETS REQUEST    Collection Time: 01/19/23  9:10 AM   Result Value Ref Range    Component P       P71                  Plts,Pheresis       [id number]   issued       01/19/23   09:11      Product Type Platelets Pheresis LR     Dispense Status issued     Unit Number (Barcoded) [id number]     Product Code (Barcoded) D6626Y58     Blood Type (Barcoded) 7300          Radiology Review:  DX-CHEST-PORTABLE (1 VIEW)   Final Result      Linear bibasilar atelectasis.            MDM (Data Review):   -Records reviewed and summarized in current documentation  -I personally reviewed and interpreted the laboratory results  -I personally reviewed the radiology images    Assessment/Recommendations:  Decompensated cirrhosis, MELD 17  Ongoing alcohol abuse.  Intoxicated upon admission  Ascites, not adequately treated vs. Refractory  ---s/p paracentesis last night, 8L removed.  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  Will assess with presence of varices and if treatment needed     Reviewed prognosis with ongoing alcohol abuse          Ivon Clark M.D.          Core Quality Measures   Reviewed items:  Labs, Medications and Radiology reports reviewed

## 2023-01-20 ENCOUNTER — PHARMACY VISIT (OUTPATIENT)
Dept: PHARMACY | Facility: MEDICAL CENTER | Age: 48
End: 2023-01-20
Payer: COMMERCIAL

## 2023-01-20 VITALS
HEART RATE: 88 BPM | BODY MASS INDEX: 26.5 KG/M2 | DIASTOLIC BLOOD PRESSURE: 84 MMHG | TEMPERATURE: 98.8 F | OXYGEN SATURATION: 100 % | HEIGHT: 68 IN | WEIGHT: 174.82 LBS | RESPIRATION RATE: 16 BRPM | SYSTOLIC BLOOD PRESSURE: 121 MMHG

## 2023-01-20 LAB
ALBUMIN SERPL BCP-MCNC: 3.4 G/DL (ref 3.2–4.9)
ALBUMIN/GLOB SERPL: 1 G/DL
ALP SERPL-CCNC: 108 U/L (ref 30–99)
ALT SERPL-CCNC: 21 U/L (ref 2–50)
ANION GAP SERPL CALC-SCNC: 11 MMOL/L (ref 7–16)
AST SERPL-CCNC: 61 U/L (ref 12–45)
BASOPHILS # BLD AUTO: 0 % (ref 0–1.8)
BASOPHILS # BLD: 0 K/UL (ref 0–0.12)
BILIRUB SERPL-MCNC: 2.8 MG/DL (ref 0.1–1.5)
BUN SERPL-MCNC: 21 MG/DL (ref 8–22)
CALCIUM ALBUM COR SERPL-MCNC: 9.2 MG/DL (ref 8.5–10.5)
CALCIUM SERPL-MCNC: 8.7 MG/DL (ref 8.5–10.5)
CHLORIDE SERPL-SCNC: 102 MMOL/L (ref 96–112)
CO2 SERPL-SCNC: 16 MMOL/L (ref 20–33)
CREAT SERPL-MCNC: 1.07 MG/DL (ref 0.5–1.4)
EOSINOPHIL # BLD AUTO: 0 K/UL (ref 0–0.51)
EOSINOPHIL NFR BLD: 0 % (ref 0–6.9)
ERYTHROCYTE [DISTWIDTH] IN BLOOD BY AUTOMATED COUNT: 49.2 FL (ref 35.9–50)
GFR SERPLBLD CREATININE-BSD FMLA CKD-EPI: 87 ML/MIN/1.73 M 2
GLOBULIN SER CALC-MCNC: 3.4 G/DL (ref 1.9–3.5)
GLUCOSE SERPL-MCNC: 148 MG/DL (ref 65–99)
HCT VFR BLD AUTO: 25.8 % (ref 42–52)
HGB BLD-MCNC: 8.6 G/DL (ref 14–18)
IMM GRANULOCYTES # BLD AUTO: 0.02 K/UL (ref 0–0.11)
IMM GRANULOCYTES NFR BLD AUTO: 0.6 % (ref 0–0.9)
LYMPHOCYTES # BLD AUTO: 0.22 K/UL (ref 1–4.8)
LYMPHOCYTES NFR BLD: 6.8 % (ref 22–41)
MAGNESIUM SERPL-MCNC: 1.9 MG/DL (ref 1.5–2.5)
MCH RBC QN AUTO: 28.5 PG (ref 27–33)
MCHC RBC AUTO-ENTMCNC: 33.3 G/DL (ref 33.7–35.3)
MCV RBC AUTO: 85.4 FL (ref 81.4–97.8)
MONOCYTES # BLD AUTO: 0.1 K/UL (ref 0–0.85)
MONOCYTES NFR BLD AUTO: 3.1 % (ref 0–13.4)
NEUTROPHILS # BLD AUTO: 2.89 K/UL (ref 1.82–7.42)
NEUTROPHILS NFR BLD: 89.5 % (ref 44–72)
NRBC # BLD AUTO: 0 K/UL
NRBC BLD-RTO: 0 /100 WBC
PHOSPHATE SERPL-MCNC: 3.1 MG/DL (ref 2.5–4.5)
PLATELET # BLD AUTO: 41 K/UL (ref 164–446)
PMV BLD AUTO: 11.2 FL (ref 9–12.9)
POTASSIUM SERPL-SCNC: 5.4 MMOL/L (ref 3.6–5.5)
PROT SERPL-MCNC: 6.8 G/DL (ref 6–8.2)
RBC # BLD AUTO: 3.02 M/UL (ref 4.7–6.1)
SODIUM SERPL-SCNC: 129 MMOL/L (ref 135–145)
WBC # BLD AUTO: 3.2 K/UL (ref 4.8–10.8)

## 2023-01-20 PROCEDURE — A9270 NON-COVERED ITEM OR SERVICE: HCPCS | Performed by: HOSPITALIST

## 2023-01-20 PROCEDURE — 700102 HCHG RX REV CODE 250 W/ 637 OVERRIDE(OP): Performed by: HOSPITALIST

## 2023-01-20 PROCEDURE — 80053 COMPREHEN METABOLIC PANEL: CPT

## 2023-01-20 PROCEDURE — A9270 NON-COVERED ITEM OR SERVICE: HCPCS

## 2023-01-20 PROCEDURE — 83735 ASSAY OF MAGNESIUM: CPT

## 2023-01-20 PROCEDURE — 85025 COMPLETE CBC W/AUTO DIFF WBC: CPT

## 2023-01-20 PROCEDURE — 700102 HCHG RX REV CODE 250 W/ 637 OVERRIDE(OP)

## 2023-01-20 PROCEDURE — 84100 ASSAY OF PHOSPHORUS: CPT

## 2023-01-20 PROCEDURE — RXMED WILLOW AMBULATORY MEDICATION CHARGE: Performed by: HOSPITALIST

## 2023-01-20 PROCEDURE — 99239 HOSP IP/OBS DSCHRG MGMT >30: CPT | Performed by: HOSPITALIST

## 2023-01-20 RX ORDER — SULFAMETHOXAZOLE AND TRIMETHOPRIM 800; 160 MG/1; MG/1
1 TABLET ORAL EVERY 12 HOURS
Qty: 10 TABLET | Refills: 0 | Status: ACTIVE | OUTPATIENT
Start: 2023-01-20 | End: 2023-04-26

## 2023-01-20 RX ORDER — FUROSEMIDE 40 MG/1
40 TABLET ORAL DAILY
Qty: 30 TABLET | Refills: 1 | Status: ON HOLD | OUTPATIENT
Start: 2023-01-21 | End: 2023-04-28

## 2023-01-20 RX ORDER — SPIRONOLACTONE 100 MG/1
100 TABLET, FILM COATED ORAL DAILY
Qty: 30 TABLET | Refills: 3 | Status: ON HOLD | OUTPATIENT
Start: 2023-01-21 | End: 2023-04-28

## 2023-01-20 RX ADMIN — Medication 100 MG: at 05:06

## 2023-01-20 RX ADMIN — SPIRONOLACTONE 100 MG: 25 TABLET ORAL at 09:37

## 2023-01-20 RX ADMIN — FUROSEMIDE 40 MG: 40 TABLET ORAL at 05:06

## 2023-01-20 RX ADMIN — OMEPRAZOLE 20 MG: 20 CAPSULE, DELAYED RELEASE ORAL at 05:06

## 2023-01-20 RX ADMIN — SULFAMETHOXAZOLE AND TRIMETHOPRIM 1 TABLET: 800; 160 TABLET ORAL at 05:05

## 2023-01-20 RX ADMIN — LACTULOSE 30 ML: 20 SOLUTION ORAL at 05:05

## 2023-01-20 NOTE — CARE PLAN
The patient is Stable - Low risk of patient condition declining or worsening    Shift Goals: Monitor hemoglobin; monitor pain and for signs and symptoms of GI bleed; pending DC  Clinical Goals: monitor/ trend hemoglobin  Patient Goals: reduce pain/ rest  Family Goals: feel better and DC    Progress made toward(s) clinical / shift goals:  see above    Problem: Knowledge Deficit - Standard  Goal: Patient and family/care givers will demonstrate understanding of plan of care, disease process/condition, diagnostic tests and medications  Outcome: Progressing     Problem: Psychosocial  Goal: Patient's level of anxiety will decrease  Outcome: Progressing  Goal: Patient's ability to verbalize feelings about condition will improve  Outcome: Progressing  Goal: Patient's ability to re-evaluate and adapt role responsibilities will improve  Outcome: Progressing  Goal: Patient and family will demonstrate ability to cope with life altering diagnosis and/or procedure  Outcome: Progressing  Goal: Spiritual and cultural needs incorporated into hospitalization  Outcome: Progressing     Problem: Communication  Goal: The ability to communicate needs accurately and effectively will improve  Outcome: Progressing     Problem: Discharge Barriers/Planning  Goal: Patient's continuum of care needs are met  Outcome: Progressing       Patient is not progressing towards the following goals:

## 2023-01-20 NOTE — DISCHARGE PLANNING
Case Management Discharge Planning    Admission Date: 1/18/2023  GMLOS: 4.9  ALOS: 2    6-Clicks ADL Score: 24  6-Clicks Mobility Score: 24      Anticipated Discharge Dispo:      DME Needed: No    Action(s) Taken: Updated Provider/Nurse on Discharge Plan    Escalations Completed: None    Medically Clear: Yes    Next Steps:     Barriers to Discharge: None    Is the patient up for discharge tomorrow: No today  Informed by staff pt to DC today- no CM needs identified.   DC plan: home w/ spouse

## 2023-01-20 NOTE — DOCUMENTATION QUERY
"                                                                         [facility]                                                                       Query Response Note      PATIENT:               JHON TURNER  ACCT #:                  [id number]  MRN:                     [id number]  :                      1975  ADMIT DATE:       2023 5:12 PM  DISCH DATE:          RESPONDING  PROVIDER #:        [id number]           QUERY TEXT:    Anemia is documented in the Medical Record. Please specify the underlying cause of the anemia.    NOTE:  If the appropriate response is not listed below, please respond with a new note.    Note: If you agree with a diagnosis listed above, please remember to include it in your concurrent daily documentation and onto the Discharge Summary.                The patient's Clinical Indicators include:  47 year old male admitted with hematemesis.     Matteoni \"Anemia, suspect acute on chronic\"     HGB 9.5, PLT 76   HGB 7.6, PLT 40    Risk factors: hematemesis, cirrhosis, coagulopathy, Thrombocytopenia  Treatment: labs, CXR, Platelets Pheresis    If you have any questions please contact:  Melissa Ceron RN CDI [facility]  [email or web address]  Melissa Ceron [address]  Options provided:   -- Blood loss anemia, acute   -- Blood loss anemia, chronic   -- Iron deficiency anemia   -- Other explanation, please specify   -- Unable to determine      Query created by: Melissa Ceron on 2023 7:34 AM    RESPONSE TEXT:    Blood loss anemia, acute          Electronically signed by:  YUNIOR PRESLEY MD 2023 10:10 AM              "

## 2023-01-20 NOTE — PROGRESS NOTES
Daily Progress Note:     Date of Service: 1/19/2023  Primary Team: [facility] Blue Team   Attending: Samm Olson M.D.   Senior Resident: Dr. Barber  Intern: Dr. Kimberly Kinsey  Contact:  [phone]    Hospital Course:   48 yo M with alcoholic cirrhosis with ascites with paracentesis biweekly, esophageal varices and hypertensive portal gastropathy on EGD s/p banding 2019 who was transferred from OSH 1/18/23 due to decompensated alcoholic cirrhosis and hematemesis and admitted for upper GI bleed secondary to esophageal varices requiring GI consult.    Interval Update:  -patient report some improvement in SOB s/p para in ER   -complains of pain at paracentesis site  -states that his hematemesis has stopped  -had BM this AM    Consultants/Specialty:  GI    Review of Systems:  Review of Systems   Constitutional:  Negative for chills, fever, malaise/fatigue and weight loss.   HENT:  Negative for ear discharge and ear pain.    Eyes:  Negative for pain and discharge.   Respiratory:  Positive for shortness of breath. Negative for cough and wheezing.    Cardiovascular:  Negative for chest pain, palpitations, orthopnea, leg swelling and PND.   Gastrointestinal:  Positive for abdominal pain, blood in stool and melena. Negative for constipation, diarrhea, heartburn, nausea and vomiting.   Genitourinary:  Negative for dysuria and urgency.   Musculoskeletal:  Negative for myalgias and neck pain.   Neurological:  Negative for dizziness and headaches.     Objective:   Vitals:   Temp:  [36.2 °C (97.1 °F)-37.1 °C (98.8 °F)] 36.3 °C (97.3 °F)  Pulse:  [] 90  Resp:  [12-22] 20  BP: (109-138)/(60-82) 127/74  SpO2:  [96 %-100 %] 100 %    Physical Exam  Constitutional:       General: He is in acute distress (mild due to abdominal discomfort).      Appearance: He is ill-appearing.   HENT:      Head: Normocephalic and atraumatic.      Mouth/Throat:      Mouth: Mucous membranes are moist.      Pharynx: Oropharynx is clear.   Eyes:       General: Scleral icterus present.      Extraocular Movements: Extraocular movements intact.   Cardiovascular:      Rate and Rhythm: Regular rhythm. Tachycardia present.      Heart sounds: No murmur heard.  Pulmonary:      Effort: Pulmonary effort is normal. No respiratory distress.      Breath sounds: Normal breath sounds. No wheezing.   Abdominal:      General: There is no distension.      Palpations: Abdomen is soft.      Tenderness: There is no abdominal tenderness. There is no guarding.   Musculoskeletal:         General: No signs of injury.      Cervical back: Normal range of motion and neck supple.      Right lower leg: No edema.      Left lower leg: No edema.   Skin:     Coloration: Skin is not jaundiced.      Findings: No erythema or rash.   Neurological:      General: No focal deficit present.      Mental Status: He is alert and oriented to person, place, and time.   Psychiatric:         Mood and Affect: Mood normal.         Behavior: Behavior normal.     Labs:   Lab Results   Component Value Date/Time    WBC 2.4 (L) 01/19/2023 03:56 AM    RBC 2.64 (L) 01/19/2023 03:56 AM    HEMOGLOBIN 7.6 (L) 01/19/2023 03:56 AM    HEMATOCRIT 22.1 (L) 01/19/2023 03:56 AM    MCV 83.7 01/19/2023 03:56 AM    MCH 28.8 01/19/2023 03:56 AM    MCHC 34.4 01/19/2023 03:56 AM    MPV 12.2 01/19/2023 03:56 AM    NEUTSPOLYS 67.70 01/19/2023 03:56 AM    LYMPHOCYTES 18.30 (L) 01/19/2023 03:56 AM    MONOCYTES 11.20 01/19/2023 03:56 AM    EOSINOPHILS 1.20 01/19/2023 03:56 AM    BASOPHILS 1.20 01/19/2023 03:56 AM    HYPOCHROMIA 1+ 09/26/2019 07:04 PM    ANISOCYTOSIS 1+ 10/20/2020 08:54 AM        Lab Results   Component Value Date/Time    SODIUM 125 (L) 01/19/2023 03:56 AM    POTASSIUM 5.5 01/19/2023 03:56 AM    CHLORIDE 100 01/19/2023 03:56 AM    CO2 14 (L) 01/19/2023 03:56 AM    GLUCOSE 167 (H) 01/19/2023 03:56 AM    BUN 16 01/19/2023 03:56 AM    CREATININE 0.70 01/19/2023 03:56 AM    GLOMRATE 74 12/16/2022 03:51 PM        No results found  for: DUIPZ95J, RDPDQH355K, TVCAH051L, ARTHCO3, ARTBE, GAPBG, FRQ8FHM2    Lab Results   Component Value Date/Time    PROTHROMBTM 19.6 (H) 01/18/2023 05:29 PM    INR 1.70 (H) 01/18/2023 05:29 PM      Imaging:   DX-CHEST-PORTABLE (1 VIEW)   Final Result      Linear bibasilar atelectasis.        Assessment and Plan:  * GI bleed- (present on admission)  Assessment & Plan  With hematemesis and melena, likely upper GI source  History of esophageal varices s/p banding and portal hypertensive gastropathy  Continue Protonix, octreotide and ceftriaxone (1/19-1/24)  1/19 EGD medium sized esophageal varices s/p banding and portal hypertensive gastropathy    Monitor H&H every 12 hours, transfuse for hemoglobin less than 7    Decompensated hepatic cirrhosis (HCC)  Assessment & Plan  Secondary to alcoholic cirrhosis  Meld 26, 19.6% 6 month mortality  1/18 s/p paracentesis with 8L fluid taken off. Albumin 50mg replaced  Blood culture, fluid culture negative  Continue lactulose with goal 2 BMs/day  Started Lasix 40mg and Sprinolactone 100mg     Thrombocytopenia (HCC)- (present on admission)  Assessment & Plan  40, which seems to be patient's baseline but due to active bleeding, transfused if <50    Alcohol abuse- (present on admission)  Assessment & Plan  IV thiamine has been started  Monitor for signs of withdrawal    Code Status: FULL  DVT prophylaxis: held due to GI bleed  Diet: Liquid  GI: pantoprazole  Disposition: to remain inpatient    Kimberly Kinsey DO  PGY-1 Internal Medicine

## 2023-01-20 NOTE — PROGRESS NOTES
0730: Went in to see patient this morning and the patient was alert and orientated x4 and had no complaints of abdominal pain and stated that he had no black or bloody stools. The patient stated that he also didn't have nausea at this time.    0900: Discussed all labs with [facility] resident with hemoglobin levels, platelets and sodium from this morning. [facility] resident was aware and said that he would round with attending and that patient was a potential discharge to home.     1030: [facility] medical came by and rounded and said that patient was cleared for discharge. They updated [id number] and said that patient was DC lounge appropriate.     1300: Discharge lounge orders were placed and [id number] said that they would deliver meds to DC lounge.    1315: Discharge lounge came to  patient and patient was showered, Ivs were discontinued and the patient and taken downstairs to discharge lounge.

## 2023-01-20 NOTE — DISCHARGE INSTRUCTIONS
Discharge Instructions:    Start Taking:  Spironolactone (Aldactone) 100 mg                                                             i.      Take one pill daily in morning                                                            ii.      Take another pill If you notice: swelling in abdomen, legs or testicles, or have a overnight weight gain of more than 3 lbs.                                                        iii.      This will help with your swelling but will make you urinate    Furosemide (Lasix) 40 mg                                                             i.      Take one pill daily in morning                                                            ii.      Take another pill If you notice: swelling in abdomen, legs or testicles, or have a overnight weight gain of more than 3 lbs.                                                        iii.      This will help with your swelling but will make you urinate    Sulfamethoxazole-trimethoprim (Bactrim) 800-160 mg                                                             i.      Take one pill of this antibiotic in morning and night for 5 days until completed                                                            ii.      This is to help prevent an infection after your procedure    STOP Taking  Propranolol 10 mg  Continue Taking:  Lactulose 10 gm/15ml                                                             i.      Take 15-30 ml two times a day                                                            ii.      This is to prevent neurological problems associated with your cirrhosis    Magnesium Oxide 400 mg                                                             i.      Take one pill at morning and night                                                            ii.      Prevents magnesium deficiency    Omeprazole 40 mg                                                             i.      Take one pill at morning and night                                                             ii.      This is to protect you from bleeding in your gastrointestinal system    Appointments:  Follow-up with your GI doctor for another procedure in 4-6 weeks                                                             i.      Bring this paperwork with you or have your doctor call to request the records    Follow-up with your primary care physician within a month                                                             i.      Bring this paperwork with you or have your doctor call to request the records    Return to hospital if:  You cannot control swelling with the furosemide and spironolactone as directed above  Coughing or vomiting blood  Bowel movements that appear darker or red in color  Feel dizzy, short of breath, pass out, or have chest pain      Esophageal Varices    Esophageal varices are enlarged veins in the part of the body that moves food from the mouth to the stomach (esophagus). They develop when extra blood is forced to flow through these veins because the blood's normal pathway is blocked. Without treatment, esophageal varices eventually break and bleed (hemorrhage), which can be life-threatening.  What are the causes?  This condition may be caused by:  Scarring of the liver (cirrhosis) due to alcoholism. This is the most common cause.  Long-term (chronic) liver disease.  Severe heart failure.  A blood clot in a vein that supplies the liver (portal vein).  A disease that causes inflammation in the organs and other body areas (sarcoidosis).  A parasitic infection that can cause liver damage (schistosomiasis).  What are the signs or symptoms?  Esophageal varices usually do not cause symptoms unless they start to bleed. Symptoms of bleeding esophageal varices include:  Vomiting material that is bright red or that is black and looks like coffee grounds.  Coughing up blood.  Stools (feces) that look black and tarry.  Dizziness or light-headedness.  Low blood  pressure.  Loss of consciousness.  How is this diagnosed?  This condition is diagnosed with a procedure called endoscopy. During endoscopy, your health care provider uses a flexible tube with a small camera on the end of it (endoscope) to look down your throat and examine your esophagus.  You may also have other tests, including:  Imaging tests such as a CT scan or ultrasound.  Blood tests.  How is this treated?  This condition may be treated with medicines or procedures that reduce pressure in the varices and reduce the risk of bleeding. Medicines are usually used for varices that are not bleeding. Procedures that may be done for bleeding varices include:  Placing an elastic band around the varices to keep them from bleeding (variceal ligation).  Replacing blood that you have lost due to bleeding. This may include getting a transfusion of blood or parts of blood, such as platelets or clotting factors.  You may be given antibiotic medicine to help prevent infection.  Getting an injection that causes the varices to shrink and close (sclerotherapy). You may also be given medicines that tighten (constrict) blood vessels or change blood flow.  Placing a tube into your esophagus and then passing a balloon through the tube and inflating the balloon (balloon tamponade). The balloon applies pressure to the bleeding veins to help stop the bleeding.  Placing a small tube within the veins in the liver (transjugular intrahepatic portosystemic shunt, TIPS). This decreases blood flow and pressure in the esophageal varices.  If other treatments do not work, you may need a liver transplant.  Follow these instructions at home:  Take over-the-counter and prescription medicines only as told by your health care provider.  If you were prescribed an antibiotic medicine, take it as told by your health care provider. Do not stop taking the antibiotic even if you start to feel better.  Do not take any NSAIDs (such as aspirin or ibuprofen)  before first getting approval from your health care provider.  Do not drink alcohol.  Return to your normal activities as told by your health care provider. Ask your health care provider what activities are safe for you.  Keep all follow-up visits as told by your health care provider. This is important.  Contact a health care provider if:  You have abdominal pain.  You are unable to eat or drink.  Get help right away if:  You have blood in your stool or vomit.  You have stools that look black or tarry.  You have chest pain.  You feel dizzy or have low blood pressure.  You lose consciousness.  These symptoms may represent a serious problem that is an emergency. Do not wait to see if the symptoms will go away. Get medical help right away. Call your local emergency services (911 in the [place] Do not drive yourself to the hospital.  Summary  Esophageal varices are enlarged veins in the esophagus, the part of your body that moves food from your mouth to your stomach.  Without treatment, esophageal varices eventually break and bleed (hemorrhage), which can be life-threatening.  Esophageal varices usually do not cause symptoms unless they start to bleed.  Keep all follow-up visits as told by your health care provider. This is important.  This information is not intended to replace advice given to you by your health care provider. Make sure you discuss any questions you have with your health care provider.  Document Released: 03/09/2005 Document Revised: 11/30/2018 Document Reviewed: 09/19/2018  [employer] Patient Education © 2020 [employer]

## 2023-01-20 NOTE — PROGRESS NOTES
Telemetry Report:    Rhythm: Normal Sinus Rhythm/ Sinus Tach   Heart Rate: 90-100bpm  Ectopy: PVCs, Coup    DC: 0.179 sec  QRS: 0.099 sec  QT: 0.39 sec      Per telemetry room monitor

## 2023-01-20 NOTE — DISCHARGE SUMMARY
Discharge Summary    CHIEF COMPLAINT ON ADMISSION  Chief Complaint   Patient presents with    Sent by MD     Transferred from [facility] in [place] for GI consult and resources.     Abdominal Swelling     Hx of alcoholic cirrhosis with ascites, pt gets paracentesis 2x a week in [place].     N/V     Pt presented to ER in [place] with hematemesis. Hx of esophageal varices with recent scope in [place] 6 months ago.        Reason for Admission  EMS     Admission Date  1/18/2023    CODE STATUS  Full Code    HPI & HOSPITAL COURSE    Patient is 47 with a history of decompensated cirrhosis of the liver (MELD 26) due to etoh complicated by GI bleed due to esophageal varices s/p banding in 2019, ascites requiring paracentesis admitted for hematemesis in the setting of binge drinking after staying sober for months due to a recent death of a family member.     Exam:  Vitals:   Vitals:    01/20/23 1200   BP: 121/84   Pulse: 88   Resp: 16   Temp: 37.1 °C (98.8 °F)   SpO2:        -Patient is awake and alert, answers questions appropriately  -No asterixis  -No jaundice  -Cardiac: RRR  -Abdomen: distended, no tenderness to palp  -LE: no edema     #Upper GI bleed  #Iron deficiency anemia due to chronic bleed.   -Did not require transfusion   S/p EGD on 1/19 found to have esophageal varices, requiring banding x 6. No active bleeding found, therefore octreotide discontinued. Propranolol discontinued.   -Patient discharged on PPI BID.  -He has follow up with GI  -He will complete 5 more days of bactrim for SBP prophylaxis      #Decompensated cirrhosis with ascites  MELD 26  AFP negative in 2019  Needs outpatient US  No signs of hepatic encephalopathy: on lactulose  Ascites management: lasix 40 + spirono 100mg daily  -s/p paracentesis on admission.     #Thrombocytopenia  Baseline in the 50s. He received 1 unit plt transfusion prior to EGD.     #ETOH use  Admission alcohol level of 214. No signs of withdrawal. Due to bereavement, as his  family member just passed away. He mentions he was able to stay sober x months.     Therefore, he is discharged in good and stable condition to home with close outpatient follow-up.    The patient met 2-midnight criteria for an inpatient stay at the time of discharge.    Discharge Date  1/20/2023    FOLLOW UP ITEMS POST DISCHARGE  GI   Liver ultrasound    DISCHARGE DIAGNOSES  Principal Problem:    GI bleed POA: Yes  Active Problems:    Alcohol abuse POA: Yes      Overview: Alc Abuse up to 1 month ago with 2 episodes for GI bleeding, working in       [facility] and in [place]            Urgent care appt 1 month ago to tell patient to stop drinking            Last Drink Dec 4 th                        Last Assessment & Plan:       Dear [PATIENT],            Due to your diagnosis of advanced liver disease, the Hepatology Team in       conjunction with The Liver Transplant Team at your Medical Center has made       the following recommendations for you, both for good health and to be       potentially considered for a liver transplant in the future.            The team recommendations are as follows:            1. If you are currently drinking alcohol, you must stop all alcohol use       immediately.  You must agree to never drink any alcohol, including       non-alcoholic drinks, or use any products with alcohol.  Alcohol is       contained in some over the counter products i.e.: cough syrup, mouthwash,       etc.  You must also agree to not use any illegal drugs, including       marijuana (even if marijuana has been prescribed).         2. In order to be considered for liver transplantation in the future, you       will need to attend Alcoholics Anonymous (AA) meetings twice a week and       keep logs of your attendance.  You will be asked to present these logs in       clinic. It these logs are not available for review in clinic, we will       presume that you have not attended AA.  If sessions are missed they may be        made up, however this must be documented.  You may have and evaluation at       any time, but once you have attended AA for approximately six months, with       documentation, your case can be discussed with your liver transplant       center for potential liver transplantation candidacy and listing.        However, attendance at AA will need to continue indefinitely, unless       otherwise determined by the Liver Transplant Team.       3. It is recommended that you have regular follow-up with your primary       care physician, including regular lab work. You should also continue to       have regular follow-up with our  Hepatology team, either in our central       office or in one of our outreach clinic sites.              It is your responsibility to make sure these items are completed.              Sincerely,            Hepatology Group and Liver Transplant Team            I have read and understand the above letter.            _______________________________________________________________________      Patient Signature         Thrombocytopenia (HCC) POA: Yes    Ascites POA: Yes    Decompensated hepatic cirrhosis (HCC) POA: Unknown  Resolved Problems:    * No resolved hospital problems. *      FOLLOW UP  No future appointments.  Tomy Wilde M.D.  [address] [facility] [address] [phone]    Follow up in 1 week(s)      Ivon Clark M.D.  [address] [facility] [address] [place] [id number] [phone]    Follow up in 4 week(s)  EGD      MEDICATIONS ON DISCHARGE     Medication List        START taking these medications        Instructions   furosemide 40 MG Tabs  Start taking on: January 21, 2023  Commonly known as: LASIX   Take 1 Tablet by mouth every day.  Dose: 40 mg     spironolactone 100 MG Tabs  Start taking on: January 21, 2023  Commonly known as: ALDACTONE   Take 1 Tablet by mouth every day.  Dose: 100 mg     sulfamethoxazole-trimethoprim 800-160 MG tablet  Commonly known as: BACTRIM  DS   Take 1 Tablet by mouth every 12 hours.  Dose: 1 Tablet            CONTINUE taking these medications        Instructions   lactulose 10 GM/15ML Soln   Take 20 g by mouth 3 times a day. 15-30 mL BID  Dose: 20 g     magnesium oxide 400 MG Tabs tablet  Commonly known as: MAG-OX   Take 1 Tab by mouth 2 Times a Day.  Dose: 400 mg     omeprazole 40 MG delayed-release capsule  Commonly known as: PRILOSEC   Take 1 Cap by mouth 2 Times a Day.  Dose: 40 mg            STOP taking these medications      propranolol 10 MG Tabs  Commonly known as: INDERAL              Allergies  No Known Allergies    DIET  Orders Placed This Encounter   Procedures    Diet Order Diet: Full Liquid (2L fluid restriction)     Standing Status:   Standing     Number of Occurrences:   1     Order Specific Question:   Diet:     Answer:   Full Liquid [11]     Comments:   2L fluid restriction       ACTIVITY  As tolerated.  Weight bearing as tolerated    CONSULTATIONS  GI    PROCEDURES  EGD    LABORATORY  Lab Results   Component Value Date    SODIUM 129 (L) 01/20/2023    POTASSIUM 5.4 01/20/2023    CHLORIDE 102 01/20/2023    CO2 16 (L) 01/20/2023    GLUCOSE 148 (H) 01/20/2023    BUN 21 01/20/2023    CREATININE 1.07 01/20/2023    GLOMRATE 74 12/16/2022        Lab Results   Component Value Date    WBC 3.2 (L) 01/20/2023    HEMOGLOBIN 8.6 (L) 01/20/2023    HEMATOCRIT 25.8 (L) 01/20/2023    PLATELETCT 41 (L) 01/20/2023        Total time of the discharge process exceeds 35 minutes.

## 2023-01-20 NOTE — CARE PLAN
Problem: Knowledge Deficit - Standard  Goal: Patient and family/care givers will demonstrate understanding of plan of care, disease process/condition, diagnostic tests and medications  Outcome: Progressing     Problem: Psychosocial  Goal: Patient's level of anxiety will decrease  Outcome: Progressing   The patient is Watcher - Medium risk of patient condition declining or worsening    Shift Goals  Clinical Goals: trend/ monitor hemoglobin  Patient Goals: pain free/ rest  Family Goals: feel better and DC

## 2023-01-21 LAB
BACTERIA FLD AEROBE CULT: NORMAL
GRAM STN SPEC: NORMAL
SIGNIFICANT IND 70042: NORMAL
SITE SITE: NORMAL
SOURCE SOURCE: NORMAL

## 2023-01-23 LAB
BACTERIA BLD CULT: NORMAL
BACTERIA BLD CULT: NORMAL
SIGNIFICANT IND 70042: NORMAL
SIGNIFICANT IND 70042: NORMAL
SITE SITE: NORMAL
SITE SITE: NORMAL
SOURCE SOURCE: NORMAL
SOURCE SOURCE: NORMAL

## 2023-04-26 ENCOUNTER — HOSPITAL ENCOUNTER (INPATIENT)
Facility: MEDICAL CENTER | Age: 48
LOS: 2 days | DRG: 682 | End: 2023-04-28
Attending: EMERGENCY MEDICINE | Admitting: FAMILY MEDICINE
Payer: MEDICAID

## 2023-04-26 DIAGNOSIS — K72.90 DECOMPENSATED HEPATIC CIRRHOSIS (HCC): ICD-10-CM

## 2023-04-26 DIAGNOSIS — K76.7 HEPATORENAL SYNDROME (HCC): ICD-10-CM

## 2023-04-26 DIAGNOSIS — K74.60 DECOMPENSATED HEPATIC CIRRHOSIS (HCC): ICD-10-CM

## 2023-04-26 DIAGNOSIS — K70.31 ASCITES DUE TO ALCOHOLIC CIRRHOSIS (HCC): ICD-10-CM

## 2023-04-26 DIAGNOSIS — F10.90 ALCOHOL USE DISORDER: ICD-10-CM

## 2023-04-26 DIAGNOSIS — K72.10 END STAGE LIVER DISEASE (HCC): ICD-10-CM

## 2023-04-26 PROBLEM — Z87.19 HISTORY OF ESOPHAGEAL VARICES WITH BLEEDING: Status: ACTIVE | Noted: 2023-04-26

## 2023-04-26 PROBLEM — N17.9 AKI (ACUTE KIDNEY INJURY) (HCC): Status: ACTIVE | Noted: 2023-04-26

## 2023-04-26 PROBLEM — D64.9 CHRONIC ANEMIA: Status: ACTIVE | Noted: 2023-04-26

## 2023-04-26 LAB
ALBUMIN SERPL BCP-MCNC: 3 G/DL (ref 3.2–4.9)
ALBUMIN/GLOB SERPL: 0.7 G/DL
ALP SERPL-CCNC: 163 U/L (ref 30–99)
ALT SERPL-CCNC: 27 U/L (ref 2–50)
AMORPH CRY #/AREA URNS HPF: PRESENT /HPF
ANION GAP SERPL CALC-SCNC: 12 MMOL/L (ref 7–16)
APPEARANCE UR: CLEAR
AST SERPL-CCNC: 81 U/L (ref 12–45)
BACTERIA #/AREA URNS HPF: ABNORMAL /HPF
BARCODED ABORH UBTYP: 6200
BARCODED PRD CODE UBPRD: NORMAL
BARCODED UNIT NUM UBUNT: NORMAL
BASOPHILS # BLD AUTO: 0.9 % (ref 0–1.8)
BASOPHILS # BLD: 0.03 K/UL (ref 0–0.12)
BILIRUB SERPL-MCNC: 2.7 MG/DL (ref 0.1–1.5)
BILIRUB UR QL STRIP.AUTO: ABNORMAL
BUN SERPL-MCNC: 30 MG/DL (ref 8–22)
CALCIUM ALBUM COR SERPL-MCNC: 9.2 MG/DL (ref 8.5–10.5)
CALCIUM SERPL-MCNC: 8.4 MG/DL (ref 8.5–10.5)
CHLORIDE SERPL-SCNC: 100 MMOL/L (ref 96–112)
CO2 SERPL-SCNC: 15 MMOL/L (ref 20–33)
COLOR UR: ABNORMAL
COMPONENT P 8504P: NORMAL
CREAT SERPL-MCNC: 2.29 MG/DL (ref 0.5–1.4)
EOSINOPHIL # BLD AUTO: 0.09 K/UL (ref 0–0.51)
EOSINOPHIL NFR BLD: 2.6 % (ref 0–6.9)
EPI CELLS #/AREA URNS HPF: ABNORMAL /HPF
ERYTHROCYTE [DISTWIDTH] IN BLOOD BY AUTOMATED COUNT: 63.1 FL (ref 35.9–50)
GFR SERPLBLD CREATININE-BSD FMLA CKD-EPI: 35 ML/MIN/1.73 M 2
GLOBULIN SER CALC-MCNC: 4.4 G/DL (ref 1.9–3.5)
GLUCOSE SERPL-MCNC: 123 MG/DL (ref 65–99)
GLUCOSE UR STRIP.AUTO-MCNC: NEGATIVE MG/DL
HCT VFR BLD AUTO: 31.9 % (ref 42–52)
HGB BLD-MCNC: 10.8 G/DL (ref 14–18)
HYALINE CASTS #/AREA URNS LPF: ABNORMAL /LPF
IMM GRANULOCYTES # BLD AUTO: 0 K/UL (ref 0–0.11)
IMM GRANULOCYTES NFR BLD AUTO: 0 % (ref 0–0.9)
KETONES UR STRIP.AUTO-MCNC: ABNORMAL MG/DL
LEUKOCYTE ESTERASE UR QL STRIP.AUTO: ABNORMAL
LIPASE SERPL-CCNC: 184 U/L (ref 11–82)
LYMPHOCYTES # BLD AUTO: 0.41 K/UL (ref 1–4.8)
LYMPHOCYTES NFR BLD: 11.6 % (ref 22–41)
MCH RBC QN AUTO: 29.3 PG (ref 27–33)
MCHC RBC AUTO-ENTMCNC: 33.9 G/DL (ref 33.7–35.3)
MCV RBC AUTO: 86.7 FL (ref 81.4–97.8)
MICRO URNS: ABNORMAL
MONOCYTES # BLD AUTO: 0.34 K/UL (ref 0–0.85)
MONOCYTES NFR BLD AUTO: 9.7 % (ref 0–13.4)
MUCOUS THREADS #/AREA URNS HPF: ABNORMAL /HPF
NEUTROPHILS # BLD AUTO: 2.65 K/UL (ref 1.82–7.42)
NEUTROPHILS NFR BLD: 75.2 % (ref 44–72)
NITRITE UR QL STRIP.AUTO: POSITIVE
NRBC # BLD AUTO: 0 K/UL
NRBC BLD-RTO: 0 /100 WBC
PH UR STRIP.AUTO: 5 [PH] (ref 5–8)
PLATELET # BLD AUTO: 47 K/UL (ref 164–446)
PMV BLD AUTO: 9.9 FL (ref 9–12.9)
POTASSIUM SERPL-SCNC: 4.5 MMOL/L (ref 3.6–5.5)
PRODUCT TYPE UPROD: NORMAL
PROT SERPL-MCNC: 7.4 G/DL (ref 6–8.2)
PROT UR QL STRIP: NEGATIVE MG/DL
RBC # BLD AUTO: 3.68 M/UL (ref 4.7–6.1)
RBC # URNS HPF: ABNORMAL /HPF
RBC UR QL AUTO: ABNORMAL
SODIUM SERPL-SCNC: 127 MMOL/L (ref 135–145)
SP GR UR STRIP.AUTO: 1.02
UNIT STATUS USTAT: NORMAL
UROBILINOGEN UR STRIP.AUTO-MCNC: 1 MG/DL
WBC # BLD AUTO: 3.5 K/UL (ref 4.8–10.8)
WBC #/AREA URNS HPF: ABNORMAL /HPF

## 2023-04-26 PROCEDURE — 30233R1 TRANSFUSION OF NONAUTOLOGOUS PLATELETS INTO PERIPHERAL VEIN, PERCUTANEOUS APPROACH: ICD-10-PCS | Performed by: EMERGENCY MEDICINE

## 2023-04-26 PROCEDURE — 49082 ABD PARACENTESIS: CPT

## 2023-04-26 PROCEDURE — 700102 HCHG RX REV CODE 250 W/ 637 OVERRIDE(OP)

## 2023-04-26 PROCEDURE — 700111 HCHG RX REV CODE 636 W/ 250 OVERRIDE (IP): Performed by: EMERGENCY MEDICINE

## 2023-04-26 PROCEDURE — 700102 HCHG RX REV CODE 250 W/ 637 OVERRIDE(OP): Performed by: FAMILY MEDICINE

## 2023-04-26 PROCEDURE — 770001 HCHG ROOM/CARE - MED/SURG/GYN PRIV*

## 2023-04-26 PROCEDURE — 36430 TRANSFUSION BLD/BLD COMPNT: CPT

## 2023-04-26 PROCEDURE — 307738 PARACENTESIS ABDOMINAL KIT: Performed by: EMERGENCY MEDICINE

## 2023-04-26 PROCEDURE — 80053 COMPREHEN METABOLIC PANEL: CPT

## 2023-04-26 PROCEDURE — 85025 COMPLETE CBC W/AUTO DIFF WBC: CPT

## 2023-04-26 PROCEDURE — A9270 NON-COVERED ITEM OR SERVICE: HCPCS | Performed by: FAMILY MEDICINE

## 2023-04-26 PROCEDURE — A9270 NON-COVERED ITEM OR SERVICE: HCPCS

## 2023-04-26 PROCEDURE — 36415 COLL VENOUS BLD VENIPUNCTURE: CPT

## 2023-04-26 PROCEDURE — 81001 URINALYSIS AUTO W/SCOPE: CPT

## 2023-04-26 PROCEDURE — P9034 PLATELETS, PHERESIS: HCPCS

## 2023-04-26 PROCEDURE — 0W9G3ZZ DRAINAGE OF PERITONEAL CAVITY, PERCUTANEOUS APPROACH: ICD-10-PCS | Performed by: EMERGENCY MEDICINE

## 2023-04-26 PROCEDURE — P9047 ALBUMIN (HUMAN), 25%, 50ML: HCPCS | Performed by: EMERGENCY MEDICINE

## 2023-04-26 PROCEDURE — 99285 EMERGENCY DEPT VISIT HI MDM: CPT

## 2023-04-26 PROCEDURE — 83690 ASSAY OF LIPASE: CPT

## 2023-04-26 PROCEDURE — 770006 HCHG ROOM/CARE - MED/SURG/GYN SEMI*

## 2023-04-26 RX ORDER — LACTULOSE 20 G/30ML
30 SOLUTION ORAL 2 TIMES DAILY
Status: DISCONTINUED | OUTPATIENT
Start: 2023-04-26 | End: 2023-04-28 | Stop reason: HOSPADM

## 2023-04-26 RX ORDER — BISACODYL 10 MG
10 SUPPOSITORY, RECTAL RECTAL
Status: DISCONTINUED | OUTPATIENT
Start: 2023-04-26 | End: 2023-04-28 | Stop reason: HOSPADM

## 2023-04-26 RX ORDER — ENOXAPARIN SODIUM 100 MG/ML
40 INJECTION SUBCUTANEOUS DAILY
Status: DISCONTINUED | OUTPATIENT
Start: 2023-04-26 | End: 2023-04-26

## 2023-04-26 RX ORDER — AMOXICILLIN 250 MG
2 CAPSULE ORAL 2 TIMES DAILY
Status: DISCONTINUED | OUTPATIENT
Start: 2023-04-26 | End: 2023-04-28 | Stop reason: HOSPADM

## 2023-04-26 RX ORDER — SPIRONOLACTONE 25 MG/1
100 TABLET ORAL DAILY
Status: DISCONTINUED | OUTPATIENT
Start: 2023-04-27 | End: 2023-04-26

## 2023-04-26 RX ORDER — FUROSEMIDE 40 MG/1
40 TABLET ORAL DAILY
Status: DISCONTINUED | OUTPATIENT
Start: 2023-04-27 | End: 2023-04-26

## 2023-04-26 RX ORDER — ALBUMIN (HUMAN) 12.5 G/50ML
62.5 SOLUTION INTRAVENOUS ONCE
Status: COMPLETED | OUTPATIENT
Start: 2023-04-26 | End: 2023-04-27

## 2023-04-26 RX ORDER — POLYETHYLENE GLYCOL 3350 17 G/17G
1 POWDER, FOR SOLUTION ORAL
Status: DISCONTINUED | OUTPATIENT
Start: 2023-04-26 | End: 2023-04-28 | Stop reason: HOSPADM

## 2023-04-26 RX ORDER — OMEPRAZOLE 20 MG/1
40 CAPSULE, DELAYED RELEASE ORAL 2 TIMES DAILY
Status: DISCONTINUED | OUTPATIENT
Start: 2023-04-26 | End: 2023-04-28 | Stop reason: HOSPADM

## 2023-04-26 RX ADMIN — OMEPRAZOLE 40 MG: 20 CAPSULE, DELAYED RELEASE ORAL at 22:17

## 2023-04-26 RX ADMIN — DOCUSATE SODIUM 50 MG AND SENNOSIDES 8.6 MG 2 TABLET: 8.6; 5 TABLET, FILM COATED ORAL at 22:17

## 2023-04-26 RX ADMIN — LACTULOSE 30 ML: 10 SOLUTION ORAL; RECTAL at 23:00

## 2023-04-26 RX ADMIN — ALBUMIN (HUMAN) 62.5 G: 5 SOLUTION INTRAVENOUS at 22:19

## 2023-04-26 ASSESSMENT — LIFESTYLE VARIABLES
DOES PATIENT WANT TO STOP DRINKING: NO
AVERAGE NUMBER OF DAYS PER WEEK YOU HAVE A DRINK CONTAINING ALCOHOL: 0
CONSUMPTION TOTAL: NEGATIVE
EVER HAD A DRINK FIRST THING IN THE MORNING TO STEADY YOUR NERVES TO GET RID OF A HANGOVER: NO
HOW MANY TIMES IN THE PAST YEAR HAVE YOU HAD 5 OR MORE DRINKS IN A DAY: 0
TOTAL SCORE: 0
TOTAL SCORE: 0
ALCOHOL_USE: NO
ON A TYPICAL DAY WHEN YOU DRINK ALCOHOL HOW MANY DRINKS DO YOU HAVE: 0
HAVE YOU EVER FELT YOU SHOULD CUT DOWN ON YOUR DRINKING: NO
TOTAL SCORE: 0
HAVE PEOPLE ANNOYED YOU BY CRITICIZING YOUR DRINKING: NO
EVER FELT BAD OR GUILTY ABOUT YOUR DRINKING: NO

## 2023-04-26 ASSESSMENT — FIBROSIS 4 INDEX
FIB4 SCORE: 14.93
FIB4 SCORE: 7.98
FIB4 SCORE: 14.93

## 2023-04-26 NOTE — ED TRIAGE NOTES
"Chief Complaint   Patient presents with    Abdominal Swelling     Pt here for abdominal swelling, difficulty with speaking/breathing. Pt has hx of ascites where they have done paracentesis.      /82   Pulse 100   Temp 36.6 °C (97.8 °F) (Temporal)   Resp 16   Ht 1.727 m (5' 8\")   Wt 75.3 kg (166 lb 0.1 oz)   SpO2 99%   BMI 25.24 kg/m²     Pt here for above cc  Obvious abdominal distention  Last paracentesis was January, last few days swelling has worsened     Pt to lobby, educated on triage proces  "

## 2023-04-27 PROBLEM — F10.90 ALCOHOL USE DISORDER: Status: ACTIVE | Noted: 2023-04-27

## 2023-04-27 LAB
ANION GAP SERPL CALC-SCNC: 14 MMOL/L (ref 7–16)
BASOPHILS # BLD AUTO: 0.9 % (ref 0–1.8)
BASOPHILS # BLD: 0.02 K/UL (ref 0–0.12)
BUN SERPL-MCNC: 31 MG/DL (ref 8–22)
CALCIUM SERPL-MCNC: 8.1 MG/DL (ref 8.5–10.5)
CHLORIDE SERPL-SCNC: 101 MMOL/L (ref 96–112)
CHOLEST SERPL-MCNC: 78 MG/DL (ref 100–199)
CO2 SERPL-SCNC: 15 MMOL/L (ref 20–33)
CREAT SERPL-MCNC: 1.93 MG/DL (ref 0.5–1.4)
CREAT UR-MCNC: 37.87 MG/DL
EOSINOPHIL # BLD AUTO: 0.07 K/UL (ref 0–0.51)
EOSINOPHIL NFR BLD: 3 % (ref 0–6.9)
ERYTHROCYTE [DISTWIDTH] IN BLOOD BY AUTOMATED COUNT: 62.9 FL (ref 35.9–50)
GFR SERPLBLD CREATININE-BSD FMLA CKD-EPI: 43 ML/MIN/1.73 M 2
GLUCOSE SERPL-MCNC: 135 MG/DL (ref 65–99)
HCT VFR BLD AUTO: 26 % (ref 42–52)
HDLC SERPL-MCNC: 33 MG/DL
HGB BLD-MCNC: 8.7 G/DL (ref 14–18)
IMM GRANULOCYTES # BLD AUTO: 0.01 K/UL (ref 0–0.11)
IMM GRANULOCYTES NFR BLD AUTO: 0.4 % (ref 0–0.9)
INR PPP: 2.19 (ref 0.87–1.13)
LDLC SERPL CALC-MCNC: 32 MG/DL
LYMPHOCYTES # BLD AUTO: 0.38 K/UL (ref 1–4.8)
LYMPHOCYTES NFR BLD: 16.2 % (ref 22–41)
MCH RBC QN AUTO: 29.2 PG (ref 27–33)
MCHC RBC AUTO-ENTMCNC: 33.5 G/DL (ref 33.7–35.3)
MCV RBC AUTO: 87.2 FL (ref 81.4–97.8)
MONOCYTES # BLD AUTO: 0.34 K/UL (ref 0–0.85)
MONOCYTES NFR BLD AUTO: 14.5 % (ref 0–13.4)
NEUTROPHILS # BLD AUTO: 1.52 K/UL (ref 1.82–7.42)
NEUTROPHILS NFR BLD: 65 % (ref 44–72)
NRBC # BLD AUTO: 0 K/UL
NRBC BLD-RTO: 0 /100 WBC
OSMOLALITY SERPL: 274 MOSM/KG H2O (ref 278–298)
OSMOLALITY UR: 258 MOSM/KG H2O (ref 300–900)
PLATELET # BLD AUTO: 44 K/UL (ref 164–446)
PMV BLD AUTO: 10.3 FL (ref 9–12.9)
POTASSIUM SERPL-SCNC: 3.8 MMOL/L (ref 3.6–5.5)
PROTHROMBIN TIME: 23.7 SEC (ref 12–14.6)
RBC # BLD AUTO: 2.98 M/UL (ref 4.7–6.1)
SODIUM SERPL-SCNC: 130 MMOL/L (ref 135–145)
SODIUM UR-SCNC: 57 MMOL/L
TRIGL SERPL-MCNC: 64 MG/DL (ref 0–149)
TSH SERPL DL<=0.005 MIU/L-ACNC: 3.24 UIU/ML (ref 0.38–5.33)
WBC # BLD AUTO: 2.3 K/UL (ref 4.8–10.8)

## 2023-04-27 PROCEDURE — A9270 NON-COVERED ITEM OR SERVICE: HCPCS

## 2023-04-27 PROCEDURE — 83935 ASSAY OF URINE OSMOLALITY: CPT

## 2023-04-27 PROCEDURE — 80048 BASIC METABOLIC PNL TOTAL CA: CPT

## 2023-04-27 PROCEDURE — 80061 LIPID PANEL: CPT

## 2023-04-27 PROCEDURE — 770006 HCHG ROOM/CARE - MED/SURG/GYN SEMI*

## 2023-04-27 PROCEDURE — 36415 COLL VENOUS BLD VENIPUNCTURE: CPT

## 2023-04-27 PROCEDURE — 84300 ASSAY OF URINE SODIUM: CPT

## 2023-04-27 PROCEDURE — 83930 ASSAY OF BLOOD OSMOLALITY: CPT

## 2023-04-27 PROCEDURE — 770001 HCHG ROOM/CARE - MED/SURG/GYN PRIV*

## 2023-04-27 PROCEDURE — 700102 HCHG RX REV CODE 250 W/ 637 OVERRIDE(OP): Performed by: STUDENT IN AN ORGANIZED HEALTH CARE EDUCATION/TRAINING PROGRAM

## 2023-04-27 PROCEDURE — 99222 1ST HOSP IP/OBS MODERATE 55: CPT | Mod: GC | Performed by: FAMILY MEDICINE

## 2023-04-27 PROCEDURE — 84443 ASSAY THYROID STIM HORMONE: CPT

## 2023-04-27 PROCEDURE — A9270 NON-COVERED ITEM OR SERVICE: HCPCS | Performed by: FAMILY MEDICINE

## 2023-04-27 PROCEDURE — A9270 NON-COVERED ITEM OR SERVICE: HCPCS | Performed by: STUDENT IN AN ORGANIZED HEALTH CARE EDUCATION/TRAINING PROGRAM

## 2023-04-27 PROCEDURE — 700102 HCHG RX REV CODE 250 W/ 637 OVERRIDE(OP): Performed by: FAMILY MEDICINE

## 2023-04-27 PROCEDURE — 85610 PROTHROMBIN TIME: CPT

## 2023-04-27 PROCEDURE — 82570 ASSAY OF URINE CREATININE: CPT

## 2023-04-27 PROCEDURE — 85025 COMPLETE CBC W/AUTO DIFF WBC: CPT

## 2023-04-27 PROCEDURE — 700102 HCHG RX REV CODE 250 W/ 637 OVERRIDE(OP)

## 2023-04-27 RX ORDER — FOLIC ACID 1 MG/1
1 TABLET ORAL DAILY
Status: DISCONTINUED | OUTPATIENT
Start: 2023-04-27 | End: 2023-04-28 | Stop reason: HOSPADM

## 2023-04-27 RX ORDER — GAUZE BANDAGE 2" X 2"
100 BANDAGE TOPICAL DAILY
Status: DISCONTINUED | OUTPATIENT
Start: 2023-04-27 | End: 2023-04-28 | Stop reason: HOSPADM

## 2023-04-27 RX ORDER — HEPARIN SODIUM 5000 [USP'U]/ML
5000 INJECTION, SOLUTION INTRAVENOUS; SUBCUTANEOUS EVERY 8 HOURS
Status: DISCONTINUED | OUTPATIENT
Start: 2023-04-27 | End: 2023-04-27

## 2023-04-27 RX ORDER — M-VIT,TX,IRON,MINS/CALC/FOLIC 27MG-0.4MG
1 TABLET ORAL DAILY
Status: DISCONTINUED | OUTPATIENT
Start: 2023-04-27 | End: 2023-04-28 | Stop reason: HOSPADM

## 2023-04-27 RX ADMIN — Medication 100 MG: at 11:45

## 2023-04-27 RX ADMIN — MULTIPLE VITAMINS W/ MINERALS TAB 1 TABLET: TAB at 11:45

## 2023-04-27 RX ADMIN — LACTULOSE 30 ML: 10 SOLUTION ORAL; RECTAL at 17:24

## 2023-04-27 RX ADMIN — OMEPRAZOLE 40 MG: 20 CAPSULE, DELAYED RELEASE ORAL at 17:24

## 2023-04-27 RX ADMIN — DOCUSATE SODIUM 50 MG AND SENNOSIDES 8.6 MG 2 TABLET: 8.6; 5 TABLET, FILM COATED ORAL at 05:47

## 2023-04-27 RX ADMIN — FOLIC ACID 1 MG: 1 TABLET ORAL at 11:45

## 2023-04-27 RX ADMIN — LACTULOSE 30 ML: 10 SOLUTION ORAL; RECTAL at 05:47

## 2023-04-27 RX ADMIN — OMEPRAZOLE 40 MG: 20 CAPSULE, DELAYED RELEASE ORAL at 05:47

## 2023-04-27 ASSESSMENT — COGNITIVE AND FUNCTIONAL STATUS - GENERAL
SUGGESTED CMS G CODE MODIFIER DAILY ACTIVITY: CH
DAILY ACTIVITIY SCORE: 24
SUGGESTED CMS G CODE MODIFIER MOBILITY: CH
MOBILITY SCORE: 24

## 2023-04-27 ASSESSMENT — ENCOUNTER SYMPTOMS
PALPITATIONS: 0
WEIGHT LOSS: 1
COUGH: 0
DIZZINESS: 0
ABDOMINAL PAIN: 0
CONSTIPATION: 0
NAUSEA: 0
SHORTNESS OF BREATH: 0
CHILLS: 0
HEADACHES: 0
DIARRHEA: 0
VOMITING: 0
FEVER: 0

## 2023-04-27 ASSESSMENT — PAIN DESCRIPTION - PAIN TYPE: TYPE: ACUTE PAIN

## 2023-04-27 ASSESSMENT — PATIENT HEALTH QUESTIONNAIRE - PHQ9
2. FEELING DOWN, DEPRESSED, IRRITABLE, OR HOPELESS: NOT AT ALL
1. LITTLE INTEREST OR PLEASURE IN DOING THINGS: NOT AT ALL
1. LITTLE INTEREST OR PLEASURE IN DOING THINGS: NOT AT ALL
2. FEELING DOWN, DEPRESSED, IRRITABLE, OR HOPELESS: NOT AT ALL
SUM OF ALL RESPONSES TO PHQ9 QUESTIONS 1 AND 2: 0
SUM OF ALL RESPONSES TO PHQ9 QUESTIONS 1 AND 2: 0

## 2023-04-27 NOTE — ED NOTES
Pt bedside report received from HEENA Way. P awake and alert AAOx4. Pt breathing on room air, hooked up with monitor. Pt has a paracentesis drain in placed.

## 2023-04-27 NOTE — ED NOTES
PT IS RESTING IN BED ON AND OFF CELL PHONE. BREATHING IS EVEN AND UNLABORED, SKIN IS WARM AND DRY, VSS, NAD, WILL CONTINUE TO MONITOR.

## 2023-04-27 NOTE — SENIOR ADMIT NOTE
[facility] MEDICINE HISTORY AND PHYSICAL     PATIENT ID:  NAME:  Jimbo Santacruz  MRN:               [id number]  YOB: 1975    Date of Admission: 4/26/2023     Attending: Allan Medina M.d.    Resident: Prashant Vance MD PGY2, Astrid Sousa PGY1    Primary Care Physician:  Tomy Wilde M.D.    CC:    Chief Complaint   Patient presents with    Abdominal Swelling     Pt here for abdominal swelling, difficulty with speaking/breathing. Pt has hx of ascites where they have done paracentesis.        HPI: Jimbo Santacruz is a 47 y.o. male who presented with snoring abdominal swelling to the point of causing any difficulty breathing with significant pain and discomfort.  Patient is a known cirrhotic with end-stage liver disease on transplant list.  Follows with GI as an outpatient.  Has been getting multiple paracentesis over the past 2 years.  Patient reports that his home medications ran out approximately 10 days ago and he has been unable to fill them due to his primary care physician being out of town.  Medications Lasix and spironolactone from what the patient can remember.    Cirrhosis likely secondary to alcohol use the patient states he has been sober for 3 years though records indicate ongoing alcohol use with an admission with acute intoxication in January.    During admission in January to [facility] patient had variceal bleeds underwent EGD at that time was discharged on PPI twice daily as well as lactulose spironolactone and furosemide.    Patient reports scheduled outpatient follow-up with his GI doc next week.  Patient denies any fever, chills, constipation or diarrhea any nausea or vomiting.  Denies any hematemesis or melena.  Does report shortness of breath and abdominal pain which has resolved since paracentesis.    Patient works as a  in [employer].    ERCourse:  In the emergency department patient with significant ascites, CBC significant for platelets of 47, hemoglobin of 10.8,  white count 3.5 CMP significant for hyponatremia at 127 and an elevated creatinine and BUN 2.29 and 30 respectively.  Due to low platelets please patient was transfused a 10 pack of platelet apheresis.  Afterwards, paracentesis was performed draining 8 L of straw-colored fluid.  Due to high volume removed patient was transfused albumin per protocol.  Patient to be admitted secondary to concern for possible hepatorenal syndrome and AROLDO    REVIEW OF SYSTEMS:   Ten systems reviewed and were negative except as noted in the HPI.                PAST MEDICAL HISTORY:  Past Medical History:   Diagnosis Date    Anemia     Ascites     Cirrhosis (HCC)     ETOH abuse     GI bleed     Liver disease     Pancreatitis        PAST SURGICAL HISTORY:  Past Surgical History:   Procedure Laterality Date    CO UPPER GI ENDOSCOPY,DIAGNOSIS N/A 1/19/2023    Procedure: GASTROSCOPY;  Surgeon: Ivon Clark M.D.;  Location: SURGERY SAME DAY [facility];  Service: Gastroenterology    CO UPPER GI ENDOSCOPY,LIGAT VARIX N/A 1/19/2023    Procedure: GASTROSCOPY, WITH VARICEAL BANDING;  Surgeon: Ivon Clark M.D.;  Location: SURGERY SAME DAY [facility];  Service: Gastroenterology    GASTROSCOPY-ENDO  9/27/2019    Procedure: GASTROSCOPY;  Surgeon: Roberto Hankins M.D.;  Location: ENDOSCOPY [facility];  Service: Gastroenterology    GASTROSCOPY-ENDO N/A 4/13/2019    Procedure: GASTROSCOPY with Banding;  Surgeon: Jose Andrade D.O.;  Location: SURGERY [facility];  Service: Gastroenterology    HERNIA REPAIR      OTHER      right leg fx repair       FAMILY HISTORY:  Family History   Problem Relation Age of Onset    Diabetes Father        SOCIAL HISTORY:   Past history of alcohol abuse patient reports being primarily sober for 3 years though records indicate acute intoxication at last admission to [facility] in January.  Denies any smoking or drug use.  Works as a  in [employer].    DIET:   Orders Placed This Encounter   Procedures     Diet Order Diet: Renal     Standing Status:   Standing     Number of Occurrences:   1     Order Specific Question:   Diet:     Answer:   Renal [8]       ALLERGIES:  No Known Allergies    OUTPATIENT MEDICATIONS:    Current Facility-Administered Medications:     senna-docusate (PERICOLACE or SENOKOT S) 8.6-50 MG per tablet 2 Tablet, 2 Tablet, Oral, BID, 2 Tablet at 23 **AND** polyethylene glycol/lytes (MIRALAX) PACKET 1 Packet, 1 Packet, Oral, QDAY PRN **AND** magnesium hydroxide (MILK OF MAGNESIA) suspension 30 mL, 30 mL, Oral, QDAY PRN **AND** bisacodyl (DULCOLAX) suppository 10 mg, 10 mg, Rectal, QDAY PRN, Allan Medina M.D.    omeprazole (PRILOSEC) capsule 40 mg, 40 mg, Oral, BID, Astrid Suosa M.D., 40 mg at 23    lactulose 20 GM/30ML solution 30 mL, 30 mL, Oral, BID, Astrid Sousa M.D.    PHYSICAL EXAM:  Vitals:    23 1900 23 1931 23   BP: 113/69 102/60 104/66 105/71   Pulse: 87 81 92 85   Resp: 16   17   Temp:    36.2 °C (97.2 °F)   TempSrc:    Temporal   SpO2: 100% 100% 100% 100%   Weight:    75 kg (165 lb 5.5 oz)   Height:       , Temp (24hrs), Av.3 °C (97.4 °F), Min:36.1 °C (97 °F), Max:36.6 °C (97.8 °F)  , Pulse Oximetry: 100 %, O2 Delivery Device: None - Room Air    General: Pt resting in NAD, cooperative   Skin:  Pink, warm and dry.  No rashes  HEENT: NC/AT. PERRL. EOMI. MMM. No nasal discharge. Oropharynx nonerythematous without exudate/plaques  Neck:  Supple without lymphadenopathy or rigidity.   Lungs:  Symmetrical.  CTAB with no W/R/R.  Good air movement   Cardiovascular:  S1/S2 RRR without murmurs  Abdomen:  Abdomen is soft, nontender, still slightly distended though the post paracentesis, nontender to palpation still with fluid wave.  Extremities:  Full range of motion. No gross deformities noted. 2+ pulses in all extremities. No edema   Spine:  Straight without vertebral anomalies.  CNS:  Muscle tone is normal. No gross  focal neurologic deficits      LAB TESTS:   Recent Labs     04/26/23  1513   WBC 3.5*   RBC 3.68*   HEMOGLOBIN 10.8*   HEMATOCRIT 31.9*   MCV 86.7   MCH 29.3   RDW 63.1*   PLATELETCT 47*   MPV 9.9   NEUTSPOLYS 75.20*   LYMPHOCYTES 11.60*   MONOCYTES 9.70   EOSINOPHILS 2.60   BASOPHILS 0.90         Recent Labs     04/26/23  1513   SODIUM 127*   POTASSIUM 4.5   CHLORIDE 100   CO2 15*   BUN 30*   CREATININE 2.29*   CALCIUM 8.4*   ALBUMIN 3.0*       CULTURES:   Results       Procedure Component Value Units Date/Time    URINALYSIS [[id number]]  (Abnormal) Collected: 04/26/23 1649    Order Status: Completed Specimen: Urine Updated: 04/26/23 1805     Color DK Yellow     Character Clear     Specific Gravity 1.017     Ph 5.0     Glucose Negative mg/dL      Ketones Trace mg/dL      Protein Negative mg/dL      Bilirubin Moderate     Urobilinogen, Urine 1.0     Nitrite Positive     Leukocyte Esterase Trace     Occult Blood Trace     Micro Urine Req Microscopic            IMAGES:  No orders to display       CONSULTS:   Consider GI, nephrology consults    ASSESSMENT/PLAN:   47 y.o. male admitted for AROLDO and end-stage liver disease.  Patient  AROLDO (acute kidney injury) (HCC)  -Pt noted to have a BUN/Cr of 30 and 2.29. Previous BUN/CR from 1 month ago was 37 and 1.84.  -Pt with significant ascites and volume overload in the setting of end stage liver disease. Has not taken furosemide or spirolactone in the past week.   -Paracentesis was performed and 8 L of fluid was removed.   -AROLDO is likely due to poor perfusion from worsening ascites and possible hepatorenal syndrome.    Plan:   -Urine sodium ordered and pending for rule out of other AROLDO causes   -Pt started on IV albumin in the ED, may need repeat dose tomorrow   -Will hold furosemide and spirnolactone overnight due to nephrotoxic effects.   -Closely monitor CMP       -Consider GI/nephrology consult in the AM       End stage liver disease (HCC)  -Pt presents with worsening  ascites, has not taken furosemide or spironolactone. Known history of alcoholic cirrhosis. Does not appear to have SBP given pt is afebrile and does not have leukocytosis. Currently sees outpatient GI and currently on transplant list. Previous hepatitis panel negative and AFP level unremarkable.   -S/P paracentesis with 8 L fluid removed   -Current MELD score pending PT/INR labs   Plan:   -At this time holding furosemide and spironolactone due to AROLDO   -Currently on IV albumin   -Pt previously on lactulose. Given history of decompensated cirrhosis and worsening ascites, will restart lactulose 30 mL BID.     Thrombocytopenia (HCC)  -Pt presented with platelet count of 47. Likely due to end stage liver disease   -S/p plasma pheresis   Plan:   -Will continue to monitor thrombocytopenia, replete as needed   -Continue to monitor CBC     History of esophageal varices with bleeding  -Previous esophageal varices with bleeding  -Continue home medication of omeprazole 40 mg daily BID    Chronic anemia  -Pt with hemoglobin of 10.2. Pt is chronically low in the setting of cirrhosis   -Has history of esophageal varices. Does not appear to have active bleed at this time.   Plan:   -Will continue to monitor CBC     Hyponatremia  -Pt with sodium of 127. Currently asymptomatic. Given patient had significant volume overloaded with 8L volume removed from paracentesis, this is likely hypervolemic hyponatremia.   Plan:   - Will continue to monitor CMP          Lines: PIV  Abx: None  DVT prophylaxis none  CODE Status: Full code      Prashant Vance MD  PGY 2 [facility]

## 2023-04-27 NOTE — ASSESSMENT & PLAN NOTE
The patient has not taken furosemide nor spironolactone for one week. Decreased concern for SBP given afebrile presentation. S/p paracentesis with 8L removed and IV albumin administration. MELD-Na Score 29 which indicates 27-32% estimated 90-day mortality. Maddrey's Discriminant Function 47.3 points which indicates poor prognosis. >32 points indicates a patient may benefit from glucocorticoid therapy. Previous hepatitis and AFP levels WNL per chart review. Likely etiology alcohol use disorder.  - Resume lactulose, titrate to 2-3 bowel movements per day  - Will resume furosemide and spironolactone  - F/u on ascitic fluid culture and cell count. Will call the patient if results abnormal.

## 2023-04-27 NOTE — PROGRESS NOTES
4 Eyes Skin Assessment Completed by Lopez RN and HEENA Jimenez.    Head WDL  Ears WDL  Nose WDL  Mouth WDL  Neck WDL  Breast/Chest WDL  Shoulder Blades WDL  Spine WDL  (R) Arm/Elbow/Hand WDL  (L) Arm/Elbow/Hand WDL  Abdomen WDL  Groin WDL  Scrotum/Coccyx/Buttocks WDL  (R) Leg WDL  (L) Leg WDL  (R) Heel/Foot/Toe WDL  (L) Heel/Foot/Toe WDL          Devices In Places NA      Interventions In Place Pillows    Possible Skin Injury No    Pictures Uploaded Into Epic N/A  Wound Consult Placed N/A  RN Wound Prevention Protocol Ordered No

## 2023-04-27 NOTE — CARE PLAN
The patient is Stable - Low risk of patient condition declining or worsening    Shift Goals  Clinical Goals: Improvement in labs  Patient Goals: Rest    Progress made toward(s) clinical / shift goals: Pt denies pain, denies SOB since paracentesis.       Problem: Knowledge Deficit - Standard  Goal: Patient and family/care givers will demonstrate understanding of plan of care, disease process/condition, diagnostic tests and medications  Outcome: Progressing     Problem: Communication  Goal: The ability to communicate needs accurately and effectively will improve  Outcome: Progressing     Problem: Discharge Barriers/Planning  Goal: Patient's continuum of care needs are met  Outcome: Progressing     Problem: Fluid Volume  Goal: Fluid volume balance will be maintained  Outcome: Progressing

## 2023-04-27 NOTE — ASSESSMENT & PLAN NOTE
Pt presented with platelet count of 47. Likely due to end stage liver disease. S/p plasma pheresis.  -Trend CBC

## 2023-04-27 NOTE — PROGRESS NOTES
"This note is intended for the purposes of medical student education and feedback only.   Please refer to the documentation by this patient's assigned medical practitioner for details of care and plans.    Medical Student Progress Note  Note Author: Cali Leigh, Student  Date: 4/27/2023    Date of Admission: 4/26/2023  Primary Team: Team A  Attending: Dr. Medina  Senior Resident: Dr. Sellers  Intern: Dr. Valdez  Medical Student: Cali Leigh MS3    ID/CC: Jimbo Santacruz is a 47 y.o. male with a PMH of ESLD 2/2 alcohol use disorder,  who was admitted on 4/26/2023 with abdominal swelling complicated with SOB, pain, and discomfort.    INTERVAL UPDATE FOR 4/27/2023  Patient's IV was leaking blood. Otherwise, no acute events overnight. Patient slept well overnight.    No acute complaints or concerns during visit. Patient reports feeling much better with reduced abdominal swelling.  Reports 20 lb s/p paracentesis.    Pt reports having has ascites in the past. Associated with salty food. Pt reports quitting alcohol 3-4 years ago and quitting non-alcoholic beers 3-4 months ago.    Review of Systems   Constitutional:  Positive for weight loss. Negative for chills and fever.   Respiratory:  Negative for cough and shortness of breath.    Cardiovascular:  Negative for chest pain, palpitations and leg swelling.   Gastrointestinal:  Negative for abdominal pain, constipation, diarrhea, nausea and vomiting.   Genitourinary:  Negative for dysuria.   Neurological:  Negative for dizziness and headaches.        OBJECTIVE   Physical Exam:  BP 91/60   Pulse 94   Temp 36.6 °C (97.9 °F) (Temporal)   Resp 18   Ht 1.727 m (5' 7.99\")   Wt 75 kg (165 lb 5.5 oz)   SpO2 100%     Intake/Output Summary (Last 24 hours) at 4/27/2023 0610  Last data filed at 4/26/2023 2130  Gross per 24 hour   Intake 734 ml   Output --   Net 734 ml       General: Well developed, well nourished, male, appears stated age, appears to be in no acute " distress.  HEENT: Normocephalic, atraumatic. EOM grossly intact.  Cardio: Normal S1 and S2. Regular rate and rhythm. No murmurs, rubs, or gallops.  Pulmonary: Lungs are clear to auscultation bilaterally. No wheezes, rales, or rhonchi.  Abdomen: Normoactive bowel sounds. Abdomen is soft but distended. No masses. No tenderness to palpation in all four quadrants. LLQ bruise s/p paracentesis. Fluid wave present.  MSK: No LE edema.  Skin:  No jaundice, ecchymoses, or petechiae.   Psych: Appropriate mood and affect.    Lab Results:  Recent Labs     04/26/23  1513 04/27/23  0232   WBC 3.5* 2.3*   RBC 3.68* 2.98*   HEMOGLOBIN 10.8* 8.7*   HEMATOCRIT 31.9* 26.0*   MCV 86.7 87.2   MCH 29.3 29.2   RDW 63.1* 62.9*   PLATELETCT 47* 44*   MPV 9.9 10.3   NEUTSPOLYS 75.20* 65.00   LYMPHOCYTES 11.60* 16.20*   MONOCYTES 9.70 14.50*   EOSINOPHILS 2.60 3.00   BASOPHILS 0.90 0.90     Recent Labs     04/26/23  1513 04/27/23  0232   SODIUM 127* 130*   POTASSIUM 4.5 3.8   CHLORIDE 100 101   CO2 15* 15*   BUN 30* 31*   CREATININE 2.29* 1.93*   CALCIUM 8.4* 8.1*   ALBUMIN 3.0*  --      Estimated GFR/CRCL = Estimated Creatinine Clearance: 46.8 mL/min (A) (by C-G formula based on SCr of 1.93 mg/dL (H)).  Recent Labs     04/26/23  1513 04/27/23  0232   GLUCOSE 123* 135*     Recent Labs     04/26/23  1513 04/27/23  0232   ASTSGOT 81*  --    ALTSGPT 27  --    TBILIRUBIN 2.7*  --    ALKPHOSPHAT 163*  --    GLOBULIN 4.4*  --    INR  --  2.19*             Recent Labs     04/27/23  0232   INR 2.19*       Microbiology Results:  Results       Procedure Component Value Units Date/Time    Fluid Culture with Gram Stain [[id number]]     Order Status: No result Specimen: Body Fluid from Peritoneal Fluid     URINALYSIS [[id number]]  (Abnormal) Collected: 04/26/23 1649    Order Status: Completed Specimen: Urine Updated: 04/26/23 1805     Color DK Yellow     Character Clear     Specific Gravity 1.017     Ph 5.0     Glucose Negative mg/dL      Ketones Trace mg/dL       Protein Negative mg/dL      Bilirubin Moderate     Urobilinogen, Urine 1.0     Nitrite Positive     Leukocyte Esterase Trace     Occult Blood Trace     Micro Urine Req Microscopic            Imaging Results:  No orders to display       EKG  Results for orders placed or performed during the hospital encounter of 23   EKG (NOW)   Result Value Ref Range    Report       [facility] Emergency Dept.    Test Date:  2023  Pt Name:    JHON TURNER              Department: ER  MRN:        [id number]                      Room:       [facility]  Gender:     Male                         Technician: [id number]  :        [id number]                   Requested By:MARTA NOLEN  Order #:    [id number]                    Reading MD: Marta Nolen    Measurements  Intervals                                Axis  Rate:       107                          P:          23  WA:         129                          QRS:        18  QRSD:       89                           T:          28  QT:         343  QTc:        458    Interpretive Statements  Sinus tachycardia  Abnormal R-wave progression, early transition  Baseline wander in lead(s) V1  Compared to ECG 10/19/2020 07:45:10  Atrial fibrillation no longer present  Atrial flutter no longer present  Intraventricular conduction delay no longer present  Electronically Signed On 2023 19:31:05 PST  by Marta Nolen         Current Medications    Current Facility-Administered Medications:     senna-docusate (PERICOLACE or SENOKOT S) 8.6-50 MG per tablet 2 Tablet, 2 Tablet, Oral, BID, 2 Tablet at 23 0547 **AND** polyethylene glycol/lytes (MIRALAX) PACKET 1 Packet, 1 Packet, Oral, QDAY PRN **AND** magnesium hydroxide (MILK OF MAGNESIA) suspension 30 mL, 30 mL, Oral, QDAY PRN **AND** bisacodyl (DULCOLAX) suppository 10 mg, 10 mg, Rectal, QDAY PRN, Allan Medina M.D.    omeprazole (PRILOSEC) capsule 40 mg, 40 mg, Oral, BID, Astrid Sousa,  M.D., 40 mg at 04/27/23 0547    lactulose 20 GM/30ML solution 30 mL, 30 mL, Oral, BID, Astrid Sousa M.D., 30 mL at 04/27/23 0547    ASSESSMENT/PLAN  Jimbo Santacruz is a 47 y.o. male with a PMH of ESLD 2/2 AUD who was admitted on 4/26/2023 for AROLDO and ESLD 2/2 medication noncompliance.    # AROLDO  - BUN/Cr = 30/2.29. vs. 37/1.84 one month ago  - 10 days w/o furosemide and spironolactone  - physical exam notable for d/t w/ fluid wave, likely 2/2 ascites  - s/p paracentesis with 8L fluid removed  - AROLDO likely 2/2 poor perfusion from worsening ascites and possible HRS  - low urine osmalality, unremarkable lipid profile, unremarkable TSH, mildly low osmalality serum indicative of fluid overload etiology  - fluid culture and cell diff pending  - Pt started on IV albumin in the ED, completed today.  - continue to hold furosemide and spirnolactone overnight due to nephrotoxic effects  - CTM BMP to monitor kidney function before restarting furosemid and spironolactone    End stage liver disease  - possibly 2/2 alcoholic cirrhosis. does not appear to be SBP given lack of fever and leukocytosis.  - s/p paracentesis with 8 L fluid removed   - MELD score = 26  - outpatient GI referral to discuss transplant  - holding furosemide and spironolactone due to AROLDO   - completed IV albumin today  - continue lactulose 30 mL BID     Thrombocytopenia  - Admitted with platetet of 47. 44 today  - s/p plasma pheresis , repleted platelets as need  - Continue to monitor CBC      History of esophageal varices with bleeding  - Previous esophageal varices with bleeding  - Continue home medication of omeprazole 40 mg daily BID     Chronic anemia  - Pt with hemoglobin of 8.7 from 10.2.  - Pt is chronically low in the setting of cirrhosis   - Has history of esophageal varices. Does not appear to have active bleed at this time.   - CTM CBC. Transfuse if < 7     Hyponatremia  -Pt with sodium of 127. Currently asymptomatic.  - Given patient had  significant volume overloaded with 8L volume removed from paracentesis, this is likely hypervolemic hyponatremia.   - CTM CMP      Core measures  Lines: PIV  Abx: None  DVT prophylaxis none  CODE Status: Full code

## 2023-04-27 NOTE — CARE PLAN
The patient is Stable - Low risk of patient condition declining or worsening    Shift Goals  Clinical Goals: safety, IV albumin  Patient Goals: rest, feel better    Progress made toward(s) clinical / shift goals:  progressing    Patient is not progressing towards the following goals:

## 2023-04-27 NOTE — H&P
[facility] MEDICINE H&P    PATIENT ID:  NAME:  Jimbo Santacruz  MRN:               [id number]  YOB: 1975    Date of Admission: 4/26/2023     Attending: Allan Medina M.d.    Resident: Hill Vance M.D. PGY-2, Astrid Sousa M.D. PGY-1     Primary Care Physician:  Tomy Wilde M.D.    CC:  Worsening ascites     HPI: Jimbo Santacruz is a 47 y.o. male with past medical history of end stage liver disease secondary to alcohol abuse who presented with worsening ascites. Patient reports for the past week he has been experiencing worsening abdominal distention. Also has been experiencing associated shortness of breath and poor urine output.  He states he has been out of his home medication of spirolactone and furosemide which has lead to the abdominal distention. He also has been eating salty foods. Denies confusion, muscle spasms, fever, chills, nausea, vomiting, or lower extremity edema. Denies current alcohol use, states last drink was over 3 years ago. Patient reports he currently is being seen by GI outpatient and is on a waiting list for liver transplant.     Per chart review, patient's last paracentesis was 3/06/23 for worsening ascites. He was also admitted 1/18/23 for  esophageal varices and was drinking alcohol at that time. Patient has been previously seen at [place] in 2015 for alcoholic liver disease who advised GI follow up in [place] with Dr. Valencia. No previous notes from [place] GI doctor in chart.     ERCourse:  CMP was ordered with NA of 127, CO2 of 15, BUN of 30, and Cr of 2.29. AST 81, ALT of 27, Alk phos of 163, and Tbili of 2.7.GFR of 35.  CBC indicated platelets of 47 and pt was treated with 10 pack platelet pheresis.Urinalysis indicated trace ketones and moderate bilirubin. Pt underwent large volume therapeutic paracentesis. IV albumin was started.     REVIEW OF SYSTEMS:   Ten systems reviewed and were negative except as noted in the HPI.                PAST MEDICAL  HISTORY:  Past Medical History:   Diagnosis Date    Anemia     Ascites     Cirrhosis (HCC)     ETOH abuse     GI bleed     Liver disease     Pancreatitis      PAST SURGICAL HISTORY:  Past Surgical History:   Procedure Laterality Date    PA UPPER GI ENDOSCOPY,DIAGNOSIS N/A 1/19/2023    Procedure: GASTROSCOPY;  Surgeon: Ivon Clark M.D.;  Location: SURGERY SAME DAY [facility];  Service: Gastroenterology    PA UPPER GI ENDOSCOPY,LIGAT VARIX N/A 1/19/2023    Procedure: GASTROSCOPY, WITH VARICEAL BANDING;  Surgeon: Ivon Clark M.D.;  Location: SURGERY SAME DAY [facility];  Service: Gastroenterology    GASTROSCOPY-ENDO  9/27/2019    Procedure: GASTROSCOPY;  Surgeon: Roberto Hankins M.D.;  Location: ENDOSCOPY [facility];  Service: Gastroenterology    GASTROSCOPY-ENDO N/A 4/13/2019    Procedure: GASTROSCOPY with Banding;  Surgeon: Jose Andrade D.O.;  Location: SURGERY [facility];  Service: Gastroenterology    HERNIA REPAIR      OTHER      right leg fx repair     FAMILY HISTORY:  Family History   Problem Relation Age of Onset    Diabetes Father      SOCIAL HISTORY:   Pt lives in [place], [place] as a  in the [place].   Smoking none   Etoh use: former alcoholic, denies current use.   Drug use none     DIET:   Orders Placed This Encounter   Procedures    Diet Order Diet: Regular     Standing Status:   Standing     Number of Occurrences:   1     Order Specific Question:   Diet:     Answer:   Regular [1]     ALLERGIES:  No Known Allergies    OUTPATIENT MEDICATIONS:    Current Facility-Administered Medications:     albumin human 25% solution 62.5 g, 62.5 g, Intravenous, Once, Angelito Bullock M.D.    senna-docusate (PERICOLACE or SENOKOT S) 8.6-50 MG per tablet 2 Tablet, 2 Tablet, Oral, BID **AND** polyethylene glycol/lytes (MIRALAX) PACKET 1 Packet, 1 Packet, Oral, QDAY PRN **AND** magnesium hydroxide (MILK OF MAGNESIA) suspension 30 mL, 30 mL, Oral, QDAY PRN **AND** bisacodyl (DULCOLAX) suppository  10 mg, 10 mg, Rectal, QDAY PRN, Allan Medina M.D.    omeprazole (PRILOSEC) capsule 40 mg, 40 mg, Oral, BID, Astrid Sousa M.D.    PHYSICAL EXAM:  Vitals:    23 1830 23 1900 23 1931 23   BP: 123/71 113/69 102/60 104/66   Pulse: 85 87 81 92   Resp: 16 16     Temp:       TempSrc:       SpO2: 100% 100% 100% 100%   Weight:       Height:       , Temp (24hrs), Av.4 °C (97.5 °F), Min:36.1 °C (97 °F), Max:36.6 °C (97.8 °F)  , Pulse Oximetry: 100 %, O2 Delivery Device: None - Room Air    General: Pt resting in NAD, cooperative   Skin:  Pink, warm and dry.  No rashes  HEENT: NC/AT. PERRL. EOMI. MMM. No nasal discharge. Oropharynx nonerythematous without exudate/plaques  Neck:  Supple without lymphadenopathy or rigidity. No JVD   Lungs:  Symmetrical.  CTAB with no W/R/R.  Good air movement   Cardiovascular:  S1/S2 RRR without M/R/G.  Abdomen:  Abdomen soft with moderate distention, paracentesis site with active serous drainage   Extremities:  Full range of motion. No gross deformities noted.   CNS:  Muscle tone is normal. Cranial nerves II-XII grossly intact. 2+ DTRs.       LAB TESTS:   Recent Labs     23  1513   WBC 3.5*   RBC 3.68*   HEMOGLOBIN 10.8*   HEMATOCRIT 31.9*   MCV 86.7   MCH 29.3   RDW 63.1*   PLATELETCT 47*   MPV 9.9   NEUTSPOLYS 75.20*   LYMPHOCYTES 11.60*   MONOCYTES 9.70   EOSINOPHILS 2.60   BASOPHILS 0.90         Recent Labs     23  1513   SODIUM 127*   POTASSIUM 4.5   CHLORIDE 100   CO2 15*   BUN 30*   CREATININE 2.29*   CALCIUM 8.4*   ALBUMIN 3.0*     CULTURES:   Results       Procedure Component Value Units Date/Time    URINALYSIS [[id number]]  (Abnormal) Collected: 23 7043    Order Status: Completed Specimen: Urine Updated: 04/26/23 1805     Color DK Yellow     Character Clear     Specific Gravity 1.017     Ph 5.0     Glucose Negative mg/dL      Ketones Trace mg/dL      Protein Negative mg/dL      Bilirubin Moderate     Urobilinogen, Urine 1.0      Nitrite Positive     Leukocyte Esterase Trace     Occult Blood Trace     Micro Urine Req Microscopic          IMAGES:  No orders to display     CONSULTS:   None     ASSESSMENT/PLAN: 47 y.o. male with history of end stage liver disease admitted with worsening ascites due to medication noncompliance and AROLDO.     AROLDO (acute kidney injury) (HCC)  -Pt noted to have a BUN/Cr of 30 and 2.29. Previous BUN/CR from 1 month ago was 37 and 1.84.  -Pt with significant ascites and volume overload in the setting of end stage liver disease. Has not taken furosemide or spirolactone in the past week.   -Paracentesis was performed and 8 L of fluid was removed.   -AROLDO is likely due to poor perfusion from worsening ascites and possible hepatorenal syndrome.    Plan:   -Urine sodium ordered and pending for rule out of other AROLDO causes   -Pt started on IV albumin in the ED, may need repeat dose tomorrow   -Will hold furosemide and spirnolactone overnight due to nephrotoxic effects.   -Closely monitor CMP       -Consider GI/nephrology consult in the AM       End stage liver disease (HCC)  -Pt presents with worsening ascites, has not taken furosemide or spironolactone. Known history of alcoholic cirrhosis. Does not appear to have SBP given pt is afebrile and does not have leukocytosis. Currently sees outpatient GI and currently on transplant list. Previous hepatitis panel negative and AFP level unremarkable.   -S/P paracentesis with 8 L fluid removed   -Current MELD score pending PT/INR labs   Plan:   -At this time holding furosemide and spironolactone due to AROLDO   -Currently on IV albumin   -Pt previously on lactulose. Given history of decompensated cirrhosis and worsening ascites, will restart lactulose 30 mL BID.     Thrombocytopenia (HCC)  -Pt presented with platelet count of 47. Likely due to end stage liver disease   -S/p plasma pheresis   Plan:   -Will continue to monitor thrombocytopenia, replete as needed   -Continue to monitor CBC      History of esophageal varices with bleeding  -Previous esophageal varices with bleeding  -Continue home medication of omeprazole 40 mg daily BID    Chronic anemia  -Pt with hemoglobin of 10.2. Pt is chronically low in the setting of cirrhosis   -Has history of esophageal varices. Does not appear to have active bleed at this time.   Plan:   -Will continue to monitor CBC     Hyponatremia  -Pt with sodium of 127. Currently asymptomatic. Given patient had significant volume overloaded with 8L volume removed from paracentesis, this is likely hypervolemic hyponatremia.   Plan:   - Will continue to monitor CMP      Core Measures:  Fluids: None        Lines: Peripheral   Abx: None   Diet: Renal diet   PPX:  SCDs  CODE STATUS: Full Code   DISPO:  Inpatient     Astrid Sousa M.D. PGY-1  [facility]

## 2023-04-27 NOTE — ED NOTES
Unable to complete med rec. Pt doesn't know his medications. Per pt, ran out of meds approximately 10 days ago

## 2023-04-27 NOTE — ED PROVIDER NOTES
ED Provider Note    CHIEF COMPLAINT  Chief Complaint   Patient presents with    Abdominal Swelling     Pt here for abdominal swelling, difficulty with speaking/breathing. Pt has hx of ascites where they have done paracentesis.        EXTERNAL RECORDS REVIEWED  Reviewed outpatient clinic visits laboratory studies gastrointestinal consultations and procedures    HPI/ROS  LIMITATION TO HISTORY   None  OUTSIDE HISTORIAN(S):  None    Jimbo Santacruz is a 47 y.o. male who presents for evaluation of weight gain abdominal distention and shortness of breath.  The patient has a known history of alcohol cirrhosis.  He has required paracentesis on several occasions.  It has not been done for several months.  He reports abdominal swelling to the point is causing mild shortness of breath.  He denies hematemesis hematochezia no high fevers or chills    PAST MEDICAL HISTORY   has a past medical history of Anemia, Ascites, Cirrhosis (HCC), ETOH abuse, GI bleed, Liver disease, and Pancreatitis.    SURGICAL HISTORY   has a past surgical history that includes hernia repair; other; gastroscopy-endo (N/A, 4/13/2019); gastroscopy-endo (9/27/2019); upper gi endoscopy,diagnosis (N/A, 1/19/2023); and upper gi endoscopy,ligat varix (N/A, 1/19/2023).    FAMILY HISTORY  Family History   Problem Relation Age of Onset    Diabetes Father        SOCIAL HISTORY  Social History     Tobacco Use    Smoking status: Never    Smokeless tobacco: Never   Vaping Use    Vaping Use: Never used   Substance and Sexual Activity    Alcohol use: Not Currently     Comment: daily    Drug use: No    Sexual activity: Not on file       CURRENT MEDICATIONS  Home Medications       Reviewed by Kimberly Umanzor R.N. (Registered Nurse) on 04/26/23 at 1505  Med List Status: Partial     Medication Last Dose Status   furosemide (LASIX) 40 MG Tab  Active   lactulose 10 GM/15ML Solution  Active   magnesium oxide (MAG-OX) 400 MG Tab tablet  Active   omeprazole (PRILOSEC) 40  "MG delayed-release capsule  Active   spironolactone (ALDACTONE) 100 MG Tab  Active   sulfamethoxazole-trimethoprim (BACTRIM DS) 800-160 MG tablet  Active                    ALLERGIES  No Known Allergies    PHYSICAL EXAM  VITAL SIGNS: /66   Pulse 88   Temp 36.4 °C (97.6 °F)   Resp 16   Ht 1.727 m (5' 8\")   Wt 75.3 kg (166 lb 0.1 oz)   SpO2 100%   BMI 25.24 kg/m²    Pulse ox interpretation: I interpret this pulse ox as normal.  Constitutional: Alert and oriented x 3, no acute distress patient appears chronically ill  HEENT: Atraumatic normocephalic, pupils are equal round reactive to light extraocular movements are intact. The nares is clear, external ears are normal, mouth shows moist mucous membranes normal dentition for age  Neck: Supple, no JVD no tracheal deviation  Cardiovascular: Regular rate and rhythm no murmur rub or gallop 2+ pulses peripherally x4  Thorax & Lungs: No respiratory distress, no wheezes rales or rhonchi, No chest tenderness.   GI: Abdominal distention is noted with massive fluid wave consistent with ascites  Skin: Warm dry no acute rash or lesion  Musculoskeletal: Moving all extremities with full range and 5 of 5 strength no acute  deformity  Neurologic: Cranial nerves III through XII are grossly intact no sensory deficit no cerebellar dysfunction   Psychiatric: Appropriate affect for situation at this time          DIAGNOSTIC STUDIES / PROCEDURES    Patient procedure: Diagnostic and therapeutic large-volume paracentesis.  Verbal consent was obtained.  The patient understands the risk and benefits of the procedure as he has had it on several occasions previously.  A large pocket of ascitic fluid is isolated in the left lower quadrant with a bedside ultrasound.  Sterile prep and drape a 3 Betadine swabs.  A total of 10 cc of 1% lidocaine without epinephrine was infiltrated to the skin and deeper tissues for anesthesia.  Using standard the tracking technique an 11 blade scalpel was " used to make a 7 mm incision and standard paracentesis needle was inserted into the abdominal cavity.  There is slight blood-tinged but clear yellow ascitic fluid aspirated.  A total of  8 liters was removed complications     LABS    Results for orders placed or performed during the hospital encounter of 04/26/23   CBC WITH DIFFERENTIAL   Result Value Ref Range    WBC 3.5 (L) 4.8 - 10.8 K/uL    RBC 3.68 (L) 4.70 - 6.10 M/uL    Hemoglobin 10.8 (L) 14.0 - 18.0 g/dL    Hematocrit 31.9 (L) 42.0 - 52.0 %    MCV 86.7 81.4 - 97.8 fL    MCH 29.3 27.0 - 33.0 pg    MCHC 33.9 33.7 - 35.3 g/dL    RDW 63.1 (H) 35.9 - 50.0 fL    Platelet Count 47 (L) 164 - 446 K/uL    MPV 9.9 9.0 - 12.9 fL    Neutrophils-Polys 75.20 (H) 44.00 - 72.00 %    Lymphocytes 11.60 (L) 22.00 - 41.00 %    Monocytes 9.70 0.00 - 13.40 %    Eosinophils 2.60 0.00 - 6.90 %    Basophils 0.90 0.00 - 1.80 %    Immature Granulocytes 0.00 0.00 - 0.90 %    Nucleated RBC 0.00 /100 WBC    Neutrophils (Absolute) 2.65 1.82 - 7.42 K/uL    Lymphs (Absolute) 0.41 (L) 1.00 - 4.80 K/uL    Monos (Absolute) 0.34 0.00 - 0.85 K/uL    Eos (Absolute) 0.09 0.00 - 0.51 K/uL    Baso (Absolute) 0.03 0.00 - 0.12 K/uL    Immature Granulocytes (abs) 0.00 0.00 - 0.11 K/uL    NRBC (Absolute) 0.00 K/uL   COMP METABOLIC PANEL   Result Value Ref Range    Sodium 127 (L) 135 - 145 mmol/L    Potassium 4.5 3.6 - 5.5 mmol/L    Chloride 100 96 - 112 mmol/L    Co2 15 (L) 20 - 33 mmol/L    Anion Gap 12.0 7.0 - 16.0    Glucose 123 (H) 65 - 99 mg/dL    Bun 30 (H) 8 - 22 mg/dL    Creatinine 2.29 (H) 0.50 - 1.40 mg/dL    Calcium 8.4 (L) 8.5 - 10.5 mg/dL    AST(SGOT) 81 (H) 12 - 45 U/L    ALT(SGPT) 27 2 - 50 U/L    Alkaline Phosphatase 163 (H) 30 - 99 U/L    Total Bilirubin 2.7 (H) 0.1 - 1.5 mg/dL    Albumin 3.0 (L) 3.2 - 4.9 g/dL    Total Protein 7.4 6.0 - 8.2 g/dL    Globulin 4.4 (H) 1.9 - 3.5 g/dL    A-G Ratio 0.7 g/dL   LIPASE   Result Value Ref Range    Lipase 184 (H) 11 - 82 U/L   CORRECTED CALCIUM    Result Value Ref Range    Correct Calcium 9.2 8.5 - 10.5 mg/dL   ESTIMATED GFR   Result Value Ref Range    GFR (CKD-EPI) 35 (A) >60 mL/min/1.73 m 2   PLATELETS REQUEST   Result Value Ref Range    Component P       P07                 Plts,Pheresis       [id number]   issued       04/26/23   17:18      Product Type Platelets  Pheresis LR     Dispense Status issued     Unit Number (Barcoded) [id number]     Product Code (Barcoded) N0206M89     Blood Type (Barcoded) 6200            COURSE & MEDICAL DECISION MAKING    ED Observation Status? Yes; I am placing the patient in to an observation status due to a diagnostic uncertainty as well as therapeutic intensity. Patient placed in observation status at 4:13 PM, 4/26/2023.     Observation plan is as follows: Tablets an IV perform extensive blood testing.  Administer IV platelets for thrombocytopenia, perform large-volume therapeutic paracentesis    Upon Reevaluation, the patient's condition has: not improved; and will be escalated to hospitalization.    Patient discharged from ED Observation status at 1920 at 4/26/2023    INITIAL ASSESSMENT, COURSE AND PLAN  Care Narrative:   This is a very pleasant 47-year-old gentleman who has a history of alcoholic liver disease he was subsequently abstained from alcohol.  He presents here with worsening shortness of breath.  [place] [facility] with no access to specialist.  Here he has evidence of acute kidney injury and anasarca with chronic hepatic failure.  His platelets were slightly low at 47 therefore prior to paracentesis he was given a 10 pack platelet apheresis.  Patient underwent large-volume therapeutic paracentesis.  Given the fact that he may be trending into hepatorenal syndrome I recommended admission to the hospital.  He was also given IV albumin.  Patient will be admitted to the hospitalist service      ADDITIONAL PROBLEM LIST    DISPOSITION AND DISCUSSIONS  I have discussed  management of the patient with the following physicians and JAIDA's: Discussed with admitting hospitalist    Discussion of management with other QHP or appropriate source(s): None    Escalation of care considered, and ultimately not performed:diagnostic imaging    Barriers to care at this time, including but not limited to: Patient does not have established PCP.     Decision tools and prescription drugs considered including, but not limited to: None    FINAL DIAGNOSIS  End-stage liver disease  Ascites with acute volume overload  Hepatorenal syndrome  Encounter for large-volume therapeutic paracentesis   5.  Thrombocytopenia requiring platelet transfusion    Electronically signed by: Angeliot Bullock M.D., 4/26/2023 5:38 PM

## 2023-04-27 NOTE — ED NOTES
Pt report given to the floor nurse. Pt awake and alert AAOx4. Breathing spontaneously on room air. IV line in placed. Pt vitally stable prior to transfer.

## 2023-04-27 NOTE — ASSESSMENT & PLAN NOTE
Concern for low effective arterial blood volume.  - Downtrending sodium  - 100 mL/hr fluids ordered. Monitor for fluid overload.

## 2023-04-27 NOTE — PROGRESS NOTES
"  [facility] FAMILY MEDICINE PROGRESS NOTE     Attending:   Dr. Allan Medina    Resident:   Lisy Sellers MD    PATIENT:   Jimbo Santacruz; [id number]; 1975    47 y.o. male with a past medical history significant for alcohol use disorder (sober x3 years), cirrhosis, pancreatitis, and esophageal varices admitted on 4/26/2023 for hepatorenal syndrome and thrombocytopenia secondary to end stage liver disease.    SUBJECTIVE:   No acute events overnight.    The patient reported feeling \"really good.\" He denied abdominal pain, fever, chills, night sweats, confusions, chest pain, shortness of breath, nausea, and vomiting.     OBJECTIVE:  Vitals:    04/26/23 2050 04/26/23 2302 04/27/23 0344 04/27/23 0800   BP: 105/71 105/72 91/60 104/66   Pulse: 85 82 94 95   Resp: 17 18 18 19   Temp: 36.2 °C (97.2 °F) 36.2 °C (97.2 °F) 36.6 °C (97.9 °F) 36.4 °C (97.5 °F)   TempSrc: Temporal Temporal Temporal Temporal   SpO2: 100%  100% 100%   Weight: 75 kg (165 lb 5.5 oz) 75 kg (165 lb 5.5 oz)     Height:  1.727 m (5' 7.99\")         Intake/Output Summary (Last 24 hours) at 4/27/2023 0518  Last data filed at 4/26/2023 2130  Gross per 24 hour   Intake 734 ml   Output --   Net 734 ml       PHYSICAL EXAM:   General: No acute distress, afebrile, resting comfortably, conversational   HEENT: NC/AT. EOMI.   Cardiovascular: RRR without murmurs, rubs, heaves. Normal capillary refill   Respiratory: CTAB, no tachypnea or retractions   Abdomen: normal bowel sounds, soft, nontender, mildly distended, no masses, no organomegaly   EXT:  CHA, no edema  Skin: No erythema/lesions   Neuro: Non-focal, alert and orientated       LABS:  Recent Labs     04/26/23  1513 04/27/23  0232   WBC 3.5* 2.3*   RBC 3.68* 2.98*   HEMOGLOBIN 10.8* 8.7*   HEMATOCRIT 31.9* 26.0*   MCV 86.7 87.2   MCH 29.3 29.2   RDW 63.1* 62.9*   PLATELETCT 47* 44*   MPV 9.9 10.3   NEUTSPOLYS 75.20* 65.00   LYMPHOCYTES 11.60* 16.20*   MONOCYTES 9.70 14.50*   EOSINOPHILS 2.60 3.00   BASOPHILS " 0.90 0.90     Recent Labs     04/26/23  1513 04/27/23  0232   SODIUM 127* 130*   POTASSIUM 4.5 3.8   CHLORIDE 100 101   CO2 15* 15*   BUN 30* 31*   CREATININE 2.29* 1.93*   CALCIUM 8.4* 8.1*   ALBUMIN 3.0*  --      Estimated GFR/CRCL = Estimated Creatinine Clearance: 46.8 mL/min (A) (by C-G formula based on SCr of 1.93 mg/dL (H)).  Recent Labs     04/26/23  1513 04/27/23  0232   GLUCOSE 123* 135*     Recent Labs     04/26/23  1513 04/27/23  0232   ASTSGOT 81*  --    ALTSGPT 27  --    TBILIRUBIN 2.7*  --    ALKPHOSPHAT 163*  --    GLOBULIN 4.4*  --    INR  --  2.19*             Recent Labs     04/27/23  0232   INR 2.19*       MICROBIOLOGY:   No results found for: BLOODCULTU, BLDCULT, BCHOLD     IMAGING:   No orders to display       CULTURES:   Results       Procedure Component Value Units Date/Time    Fluid Culture with Gram Stain [[id number]] Collected: 04/27/23 0702    Order Status: Sent Specimen: Body Fluid from Peritoneal Fluid     URINALYSIS [[id number]]  (Abnormal) Collected: 04/26/23 1649    Order Status: Completed Specimen: Urine Updated: 04/26/23 1805     Color DK Yellow     Character Clear     Specific Gravity 1.017     Ph 5.0     Glucose Negative mg/dL      Ketones Trace mg/dL      Protein Negative mg/dL      Bilirubin Moderate     Urobilinogen, Urine 1.0     Nitrite Positive     Leukocyte Esterase Trace     Occult Blood Trace     Micro Urine Req Microscopic            MEDS:  Current Facility-Administered Medications   Medication Last Admin    thiamine (Vitamin B-1) tablet 100 mg 100 mg at 04/27/23 1145    folic acid (FOLVITE) tablet 1 mg 1 mg at 04/27/23 1145    therapeutic multivitamin-minerals (THERAGRAN-M) tablet 1 Tablet 1 Tablet at 04/27/23 1145    senna-docusate (PERICOLACE or SENOKOT S) 8.6-50 MG per tablet 2 Tablet 2 Tablet at 04/27/23 0547    And    polyethylene glycol/lytes (MIRALAX) PACKET 1 Packet      And    magnesium hydroxide (MILK OF MAGNESIA) suspension 30 mL      And    bisacodyl  (DULCOLAX) suppository 10 mg      omeprazole (PRILOSEC) capsule 40 mg 40 mg at 04/27/23 0547    lactulose 20 GM/30ML solution 30 mL 30 mL at 04/27/23 0547       ASSESSMENT/PLAN: 47 y.o. male with a past medical history significant for alcohol use disorder (sober x3 years), cirrhosis, pancreatitis, and esophageal varices admitted on 4/26/2023 for hepatorenal syndrome and thrombocytopenia secondary to end stage liver disease.    * End stage liver disease (HCC)- (present on admission)  Assessment & Plan  The patient has not taken furosemide nor spironolactone for one week. Decreased concern for SBP given afebrile presentation. S/p paracentesis with 8L removed and IV albumin administration. MELD-Na Score 29 which indicates 27-32% estimated 90-day mortality. Maddrey's Discriminant Function 47.3 points which indicates poor prognosis. >32 points indicates a patient may benefit from glucocorticoid therapy. Previous hepatitis and AFP levels WNL per chart review. Likely etiology alcohol use disorder.  - Resume lactulose, titrate to 2-3 bowel movements per day  - Will hold furosemide and spirnolactone overnight due to nephrotoxic effects. Goal would be to restart furosemide and spironolactone after AROLDO resolves and before discharge.  - F/u on ascitic fluid culture and cell count    AROLDO (acute kidney injury) (HCC)  Assessment & Plan  HRS Type 1: HRS-AROLDO. Baseline Cr 0.6 (0.6 x 1.5 = 0.91).  -Urine sodium ordered and pending for rule out of other AROLDO causes. Will review upon return.  - HOLD nephrotoxic agents      Hyponatremia  Assessment & Plan  Concern for hypervolemic hyponatremia.   - Repeat CMP  - Urine sodium, urine osm, serum osm, TSH, lipid ordered. Will review upon return.      Alcohol use disorder  Assessment & Plan  Formerly    Chronic anemia  Assessment & Plan   -Will continue to monitor CBC     History of esophageal varices with bleeding  Assessment & Plan  -Continue home medication of omeprazole 40 mg daily  BID    Thrombocytopenia (HCC)- (present on admission)  Assessment & Plan  Pt presented with platelet count of 47. Likely due to end stage liver disease. S/p plasma pheresis.  -Trend CBC       Core Measures:   Fluids: None  Diet: Renal/high protein/2 gram sodium  Lines: IV  Abx: None  DVT prophylaxis: SCD  Code Status: Full    Disposition: Inpatient for HRS    Lisy Sellers MD   PGY-2 Family Medicine Resident   [facility] [place]  pro   Spine appears normal, range of motion is not limited, no muscle or joint tenderness

## 2023-04-27 NOTE — PROGRESS NOTES
Called pt's pharmacy multiple time to obtain med list. No answer. Called pharmacy between [phone].

## 2023-04-27 NOTE — PROGRESS NOTES
Pt transferred to room without issue. No complaints of pair or SOB. Meds taken whole. IV albumin running. Skin intact. 2 RN skin check completed. VSS. Safety precautions in place. Will continue to monitor.

## 2023-04-27 NOTE — ED NOTES
BEDSIDE REPORT RECEIVED FROM HEENA POLO     PT IS RESTING IN BED. BREATHING IS EVEN AND UNLABORED, SKIN IS WARM AND DRY, VSS, NAD, WILL CONTINUE TO MONITOR.

## 2023-04-28 ENCOUNTER — PHARMACY VISIT (OUTPATIENT)
Dept: PHARMACY | Facility: MEDICAL CENTER | Age: 48
End: 2023-04-28
Payer: COMMERCIAL

## 2023-04-28 VITALS
BODY MASS INDEX: 25.06 KG/M2 | HEIGHT: 68 IN | WEIGHT: 165.34 LBS | OXYGEN SATURATION: 100 % | TEMPERATURE: 98.1 F | SYSTOLIC BLOOD PRESSURE: 102 MMHG | HEART RATE: 104 BPM | RESPIRATION RATE: 16 BRPM | DIASTOLIC BLOOD PRESSURE: 69 MMHG

## 2023-04-28 LAB
ALBUMIN SERPL BCP-MCNC: 3 G/DL (ref 3.2–4.9)
ALBUMIN/GLOB SERPL: 1 G/DL
ALP SERPL-CCNC: 107 U/L (ref 30–99)
ALT SERPL-CCNC: 18 U/L (ref 2–50)
ANION GAP SERPL CALC-SCNC: 11 MMOL/L (ref 7–16)
AST SERPL-CCNC: 43 U/L (ref 12–45)
BILIRUB SERPL-MCNC: 2.2 MG/DL (ref 0.1–1.5)
BUN SERPL-MCNC: 29 MG/DL (ref 8–22)
CALCIUM ALBUM COR SERPL-MCNC: 8.8 MG/DL (ref 8.5–10.5)
CALCIUM SERPL-MCNC: 8 MG/DL (ref 8.5–10.5)
CHLORIDE SERPL-SCNC: 101 MMOL/L (ref 96–112)
CO2 SERPL-SCNC: 16 MMOL/L (ref 20–33)
CREAT SERPL-MCNC: 1.44 MG/DL (ref 0.5–1.4)
ERYTHROCYTE [DISTWIDTH] IN BLOOD BY AUTOMATED COUNT: 64.2 FL (ref 35.9–50)
GFR SERPLBLD CREATININE-BSD FMLA CKD-EPI: 61 ML/MIN/1.73 M 2
GLOBULIN SER CALC-MCNC: 3 G/DL (ref 1.9–3.5)
GLUCOSE SERPL-MCNC: 132 MG/DL (ref 65–99)
HCT VFR BLD AUTO: 25.2 % (ref 42–52)
HGB BLD-MCNC: 8.6 G/DL (ref 14–18)
MCH RBC QN AUTO: 29.7 PG (ref 27–33)
MCHC RBC AUTO-ENTMCNC: 34.1 G/DL (ref 33.7–35.3)
MCV RBC AUTO: 86.9 FL (ref 81.4–97.8)
PLATELET # BLD AUTO: 46 K/UL (ref 164–446)
PMV BLD AUTO: 10.1 FL (ref 9–12.9)
POTASSIUM SERPL-SCNC: 3.4 MMOL/L (ref 3.6–5.5)
PROT SERPL-MCNC: 6 G/DL (ref 6–8.2)
RBC # BLD AUTO: 2.9 M/UL (ref 4.7–6.1)
SODIUM SERPL-SCNC: 128 MMOL/L (ref 135–145)
WBC # BLD AUTO: 2.4 K/UL (ref 4.8–10.8)

## 2023-04-28 PROCEDURE — A9270 NON-COVERED ITEM OR SERVICE: HCPCS | Performed by: STUDENT IN AN ORGANIZED HEALTH CARE EDUCATION/TRAINING PROGRAM

## 2023-04-28 PROCEDURE — A9270 NON-COVERED ITEM OR SERVICE: HCPCS | Performed by: FAMILY MEDICINE

## 2023-04-28 PROCEDURE — 700102 HCHG RX REV CODE 250 W/ 637 OVERRIDE(OP): Performed by: FAMILY MEDICINE

## 2023-04-28 PROCEDURE — 700102 HCHG RX REV CODE 250 W/ 637 OVERRIDE(OP)

## 2023-04-28 PROCEDURE — 700102 HCHG RX REV CODE 250 W/ 637 OVERRIDE(OP): Performed by: STUDENT IN AN ORGANIZED HEALTH CARE EDUCATION/TRAINING PROGRAM

## 2023-04-28 PROCEDURE — 99238 HOSP IP/OBS DSCHRG MGMT 30/<: CPT | Mod: GC | Performed by: FAMILY MEDICINE

## 2023-04-28 PROCEDURE — 700105 HCHG RX REV CODE 258: Performed by: STUDENT IN AN ORGANIZED HEALTH CARE EDUCATION/TRAINING PROGRAM

## 2023-04-28 PROCEDURE — A9270 NON-COVERED ITEM OR SERVICE: HCPCS

## 2023-04-28 PROCEDURE — 85027 COMPLETE CBC AUTOMATED: CPT

## 2023-04-28 PROCEDURE — 36415 COLL VENOUS BLD VENIPUNCTURE: CPT

## 2023-04-28 PROCEDURE — RXMED WILLOW AMBULATORY MEDICATION CHARGE: Performed by: STUDENT IN AN ORGANIZED HEALTH CARE EDUCATION/TRAINING PROGRAM

## 2023-04-28 PROCEDURE — 80053 COMPREHEN METABOLIC PANEL: CPT

## 2023-04-28 RX ORDER — FOLIC ACID 1 MG/1
1 TABLET ORAL DAILY
Qty: 30 TABLET | Refills: 0 | Status: SHIPPED | OUTPATIENT
Start: 2023-04-29

## 2023-04-28 RX ORDER — FUROSEMIDE 20 MG/1
20 TABLET ORAL DAILY
Qty: 30 TABLET | Refills: 0 | Status: SHIPPED | OUTPATIENT
Start: 2023-04-28 | End: 2023-05-28

## 2023-04-28 RX ORDER — LANOLIN ALCOHOL/MO/W.PET/CERES
100 CREAM (GRAM) TOPICAL DAILY
Qty: 3 TABLET | Refills: 0 | Status: SHIPPED | OUTPATIENT
Start: 2023-04-29 | End: 2023-05-02

## 2023-04-28 RX ORDER — FUROSEMIDE 20 MG/1
20 TABLET ORAL
Status: DISCONTINUED | OUTPATIENT
Start: 2023-04-28 | End: 2023-04-28 | Stop reason: HOSPADM

## 2023-04-28 RX ORDER — SODIUM CHLORIDE 9 MG/ML
INJECTION, SOLUTION INTRAVENOUS CONTINUOUS
Status: DISCONTINUED | OUTPATIENT
Start: 2023-04-28 | End: 2023-04-28

## 2023-04-28 RX ORDER — M-VIT,TX,IRON,MINS/CALC/FOLIC 27MG-0.4MG
1 TABLET ORAL DAILY
Qty: 30 TABLET | Refills: 11 | Status: SHIPPED | OUTPATIENT
Start: 2023-04-29

## 2023-04-28 RX ORDER — LANOLIN ALCOHOL/MO/W.PET/CERES
400 CREAM (GRAM) TOPICAL ONCE
Status: COMPLETED | OUTPATIENT
Start: 2023-04-28 | End: 2023-04-28

## 2023-04-28 RX ORDER — DIPHENHYDRAMINE HCL 25 MG
25 TABLET ORAL ONCE
Status: COMPLETED | OUTPATIENT
Start: 2023-04-28 | End: 2023-04-28

## 2023-04-28 RX ORDER — SPIRONOLACTONE 50 MG/1
50 TABLET, FILM COATED ORAL DAILY
Qty: 30 TABLET | Refills: 3 | Status: SHIPPED | OUTPATIENT
Start: 2023-04-28

## 2023-04-28 RX ORDER — LACTULOSE 10 G/15ML
SOLUTION ORAL 2 TIMES DAILY
Qty: 473 ML | Refills: 0 | Status: SHIPPED | OUTPATIENT
Start: 2023-04-28

## 2023-04-28 RX ORDER — SPIRONOLACTONE 50 MG/1
50 TABLET, FILM COATED ORAL
Status: DISCONTINUED | OUTPATIENT
Start: 2023-04-28 | End: 2023-04-28 | Stop reason: HOSPADM

## 2023-04-28 RX ADMIN — DIPHENHYDRAMINE HYDROCHLORIDE 25 MG: 25 TABLET ORAL at 00:36

## 2023-04-28 RX ADMIN — FUROSEMIDE 20 MG: 20 TABLET ORAL at 12:04

## 2023-04-28 RX ADMIN — DOCUSATE SODIUM 50 MG AND SENNOSIDES 8.6 MG 2 TABLET: 8.6; 5 TABLET, FILM COATED ORAL at 04:48

## 2023-04-28 RX ADMIN — SPIRONOLACTONE 50 MG: 50 TABLET ORAL at 12:04

## 2023-04-28 RX ADMIN — Medication 400 MG: at 00:36

## 2023-04-28 RX ADMIN — MULTIPLE VITAMINS W/ MINERALS TAB 1 TABLET: TAB at 04:48

## 2023-04-28 RX ADMIN — LACTULOSE 30 ML: 10 SOLUTION ORAL; RECTAL at 04:48

## 2023-04-28 RX ADMIN — SODIUM CHLORIDE: 9 INJECTION, SOLUTION INTRAVENOUS at 09:19

## 2023-04-28 RX ADMIN — FOLIC ACID 1 MG: 1 TABLET ORAL at 04:48

## 2023-04-28 RX ADMIN — Medication 100 MG: at 04:48

## 2023-04-28 RX ADMIN — OMEPRAZOLE 40 MG: 20 CAPSULE, DELAYED RELEASE ORAL at 04:48

## 2023-04-28 ASSESSMENT — ENCOUNTER SYMPTOMS
CONSTIPATION: 0
DIARRHEA: 0
HEADACHES: 0
FEVER: 0
CHILLS: 0
SHORTNESS OF BREATH: 0
PALPITATIONS: 0
NAUSEA: 0
DIZZINESS: 0
COUGH: 0
VOMITING: 0
ABDOMINAL PAIN: 0

## 2023-04-28 NOTE — PROGRESS NOTES
Patient is educated on plan of care during course of hospital stay. Patient is educated on risks for falls and use of call light for assistance. Pain will be managed within patient's comfort zone. Patient currently resting with no complaints at this time. Hourly rounding and fall precautions are in place. Bedside table and call light are within reach.

## 2023-04-28 NOTE — PROGRESS NOTES
[facility] [place] FAMILY MEDICINE PROGRESS NOTE     Attending:   Allan Medina MD    Resident:   Lisy Sellers MD    PATIENT:   Jimbo Santacruz; [id number]; 1975    47 y.o. male with a past medical history significant for alcohol use disorder (sober x3 years), cirrhosis, pancreatitis, and esophageal varices admitted on 4/26/2023 for hepatorenal syndrome and thrombocytopenia secondary to end stage liver disease.    SUBJECTIVE:   No acute events overnight. He denied chest and abdominal pain. He denied shortness of breath.    OBJECTIVE:  Vitals:    04/28/23 0345 04/28/23 0851 04/28/23 1202 04/28/23 1301   BP: 99/62 100/65 107/64 102/69   Pulse: (!) 103 98 (!) 102 (!) 104   Resp: 18 18  16   Temp: 37.2 °C (98.9 °F) 36.7 °C (98.1 °F)     TempSrc: Temporal Temporal     SpO2: 100% 100%     Weight:       Height:           Intake/Output Summary (Last 24 hours) at 4/28/2023 0725  Last data filed at 4/27/2023 1924  Gross per 24 hour   Intake 480 ml   Output --   Net 480 ml       PHYSICAL EXAM:   General: No acute distress, afebrile, resting comfortably, conversational. Malnourished.  HEENT: NC/AT. EOMI.   Cardiovascular: RRR without murmurs, rubs, heaves. Normal capillary refill   Respiratory: CTAB, no tachypnea or retractions   Abdomen: normal bowel sounds, soft, nontender, mildly distended, no masses, no organomegaly   EXT:  CHA, no edema  Skin: No erythema/lesions   Neuro: Non-focal, alert and orientated       LABS:  Recent Labs     04/26/23  1513 04/27/23  0232 04/28/23  0444   WBC 3.5* 2.3* 2.4*   RBC 3.68* 2.98* 2.90*   HEMOGLOBIN 10.8* 8.7* 8.6*   HEMATOCRIT 31.9* 26.0* 25.2*   MCV 86.7 87.2 86.9   MCH 29.3 29.2 29.7   RDW 63.1* 62.9* 64.2*   PLATELETCT 47* 44* 46*   MPV 9.9 10.3 10.1   NEUTSPOLYS 75.20* 65.00  --    LYMPHOCYTES 11.60* 16.20*  --    MONOCYTES 9.70 14.50*  --    EOSINOPHILS 2.60 3.00  --    BASOPHILS 0.90 0.90  --      Recent Labs     04/26/23  1513 04/27/23  0232 04/28/23  0444   SODIUM 127* 130* 128*    POTASSIUM 4.5 3.8 3.4*   CHLORIDE 100 101 101   CO2 15* 15* 16*   BUN 30* 31* 29*   CREATININE 2.29* 1.93* 1.44*   CALCIUM 8.4* 8.1* 8.0*   ALBUMIN 3.0*  --  3.0*     Estimated GFR/CRCL = Estimated Creatinine Clearance: 62.7 mL/min (A) (by C-G formula based on SCr of 1.44 mg/dL (H)).  Recent Labs     04/26/23  1513 04/27/23  0232 04/28/23 0444   GLUCOSE 123* 135* 132*     Recent Labs     04/26/23  1513 04/27/23  0232 04/28/23 0444   ASTSGOT 81*  --  43   ALTSGPT 27  --  18   TBILIRUBIN 2.7*  --  2.2*   ALKPHOSPHAT 163*  --  107*   GLOBULIN 4.4*  --  3.0   INR  --  2.19*  --              Recent Labs     04/27/23 0232   INR 2.19*       MICROBIOLOGY:   No results found for: BLOODCULTU, BLDCULT, BCHOLD     IMAGING:   No orders to display       CULTURES:   Results       Procedure Component Value Units Date/Time    Fluid Culture with Gram Stain [[id number]] Collected: 04/27/23 0702    Order Status: Sent Specimen: Body Fluid from Peritoneal Fluid     URINALYSIS [[id number]]  (Abnormal) Collected: 04/26/23 1649    Order Status: Completed Specimen: Urine Updated: 04/26/23 1805     Color DK Yellow     Character Clear     Specific Gravity 1.017     Ph 5.0     Glucose Negative mg/dL      Ketones Trace mg/dL      Protein Negative mg/dL      Bilirubin Moderate     Urobilinogen, Urine 1.0     Nitrite Positive     Leukocyte Esterase Trace     Occult Blood Trace     Micro Urine Req Microscopic            MEDS:  Current Facility-Administered Medications   Medication Last Admin    furosemide (LASIX) tablet 20 mg 20 mg at 04/28/23 1204    spironolactone (ALDACTONE) tablet 50 mg 50 mg at 04/28/23 1204    thiamine (Vitamin B-1) tablet 100 mg 100 mg at 04/28/23 0448    folic acid (FOLVITE) tablet 1 mg 1 mg at 04/28/23 0448    therapeutic multivitamin-minerals (THERAGRAN-M) tablet 1 Tablet 1 Tablet at 04/28/23 0448    senna-docusate (PERICOLACE or SENOKOT S) 8.6-50 MG per tablet 2 Tablet 2 Tablet at 04/28/23 0448    And    polyethylene  glycol/lytes (MIRALAX) PACKET 1 Packet      And    magnesium hydroxide (MILK OF MAGNESIA) suspension 30 mL      And    bisacodyl (DULCOLAX) suppository 10 mg      omeprazole (PRILOSEC) capsule 40 mg 40 mg at 04/28/23 0448    lactulose 20 GM/30ML solution 30 mL 30 mL at 04/28/23 0448       ASSESSMENT/PLAN: 47 y.o. male with a past medical history significant for alcohol use disorder (sober x3 years), cirrhosis, pancreatitis, and esophageal varices admitted on 4/26/2023 for hepatorenal syndrome and thrombocytopenia secondary to end stage liver disease.    * End stage liver disease (HCC)- (present on admission)  Assessment & Plan  The patient has not taken furosemide nor spironolactone for one week. Decreased concern for SBP given afebrile presentation. S/p paracentesis with 8L removed and IV albumin administration. MELD-Na Score 29 which indicates 27-32% estimated 90-day mortality. Maddrey's Discriminant Function 47.3 points which indicates poor prognosis. >32 points indicates a patient may benefit from glucocorticoid therapy. Previous hepatitis and AFP levels WNL per chart review. Likely etiology alcohol use disorder.  - Resume lactulose, titrate to 2-3 bowel movements per day  - Will resume furosemide and spironolactone  - F/u on ascitic fluid culture and cell count. Will call the patient if results abnormal.    AROLDO (acute kidney injury) (HCC)  Assessment & Plan  Improving.  - 100 mL/hr fluids ordered. Monitor for fluid overload.     Hyponatremia  Assessment & Plan  Concern for low effective arterial blood volume.  - Downtrending sodium  - 100 mL/hr fluids ordered. Monitor for fluid overload.       Alcohol use disorder  Assessment & Plan  Former.  - Thiamine, folate, multivitamin supplementations ordered    Chronic anemia  Assessment & Plan   -Will continue to monitor CBC     History of esophageal varices with bleeding  Assessment & Plan  -Continue home medication of omeprazole 40 mg daily BID    Thrombocytopenia  (HCC)- (present on admission)  Assessment & Plan  Pt presented with platelet count of 47. Likely due to end stage liver disease. S/p plasma pheresis.  -Trend CBC       Core Measures:   Fluids: None  Diet: Renal/high protein/2 gram sodium  Lines: IV  Abx: None  DVT prophylaxis: SCD  Code Status: Full    Disposition: Medically clear for discharge    Lisy Sellers MD   PGY-2 Family Medicine Resident   [facility]

## 2023-04-28 NOTE — PROGRESS NOTES
"This note is intended for the purposes of medical student education and feedback only.   Please refer to the documentation by this patient's assigned medical practitioner for details of care and plans.    Medical Student Progress Note  Note Author: Cali Leigh, Student  Date: 4/28/2023    Date of Admission: 4/26/2023  Primary Team: Team A  Attending: Dr. Medina  Senior Resident: Dr. Sellers  Intern: Dr. Valdez  Medical Student: Cali Leigh MS3    ID/CC: Jimbo Santacruz is a 47 y.o. male with a PMH of alcohol use disorder - sober x3 years, cirrhosis, pancreatitis, and esophageal varices who was admitted on 4/26/2023 with hepatorenal syndrome and thrombocytopenia 2/2 end stage liver disease.    INTERVAL UPDATE FOR 4/28/2023  No acute events overnight.    Patient slept well. No questions, concerns of complaints at this time.    Denies F/C, N/V/D, abdominal pain, dysuria, chest pain palpitations, SOB. Eating, drinking ok. No issues with BM/urination.    Review of Systems   Constitutional:  Negative for chills and fever.   Respiratory:  Negative for cough and shortness of breath.    Cardiovascular:  Negative for chest pain, palpitations and leg swelling.   Gastrointestinal:  Negative for abdominal pain, constipation, diarrhea, nausea and vomiting.   Genitourinary:  Negative for dysuria.   Neurological:  Negative for dizziness and headaches.        OBJECTIVE   Physical Exam:  BP 99/62   Pulse (!) 103   Temp 37.2 °C (98.9 °F) (Temporal)   Resp 18   Ht 1.727 m (5' 7.99\")   Wt 75 kg (165 lb 5.5 oz)   SpO2 100%     Intake/Output Summary (Last 24 hours) at 4/28/2023 0558  Last data filed at 4/27/2023 1924  Gross per 24 hour   Intake 720 ml   Output --   Net 720 ml       General: Male, appears stated age, appears to be in no acute distress.  HEENT: Normocephalic, atraumatic. EOM grossly intact.  Cardio: Normal S1 and S2. Regular rate and rhythm. No murmurs, rubs, or gallops.  Pulmonary: Lungs are clear to " auscultation bilaterally. No wheezes, rales, or rhonchi.  Abdomen: Normoactive bowel sounds. Abdomen is soft. Slightly distended with some fluid shift. No masses. No tenderness to palpation in all four quadrants.   MSK:  No LE edema.  Psych: Appropriate mood and affect.    Lab Results:  Recent Labs     04/26/23  1513 04/27/23 0232 04/28/23  0444   WBC 3.5* 2.3* 2.4*   RBC 3.68* 2.98* 2.90*   HEMOGLOBIN 10.8* 8.7* 8.6*   HEMATOCRIT 31.9* 26.0* 25.2*   MCV 86.7 87.2 86.9   MCH 29.3 29.2 29.7   RDW 63.1* 62.9* 64.2*   PLATELETCT 47* 44* 46*   MPV 9.9 10.3 10.1   NEUTSPOLYS 75.20* 65.00  --    LYMPHOCYTES 11.60* 16.20*  --    MONOCYTES 9.70 14.50*  --    EOSINOPHILS 2.60 3.00  --    BASOPHILS 0.90 0.90  --      Recent Labs     04/26/23  1513 04/27/23 0232   SODIUM 127* 130*   POTASSIUM 4.5 3.8   CHLORIDE 100 101   CO2 15* 15*   BUN 30* 31*   CREATININE 2.29* 1.93*   CALCIUM 8.4* 8.1*   ALBUMIN 3.0*  --      Estimated GFR/CRCL = Estimated Creatinine Clearance: 46.8 mL/min (A) (by C-G formula based on SCr of 1.93 mg/dL (H)).  Recent Labs     04/26/23  1513 04/27/23 0232   GLUCOSE 123* 135*     Recent Labs     04/26/23  1513 04/27/23 0232   ASTSGOT 81*  --    ALTSGPT 27  --    TBILIRUBIN 2.7*  --    ALKPHOSPHAT 163*  --    GLOBULIN 4.4*  --    INR  --  2.19*             Recent Labs     04/27/23 0232   INR 2.19*       Microbiology Results:  Results       Procedure Component Value Units Date/Time    Fluid Culture with Gram Stain [[id number]] Collected: 04/27/23 0702    Order Status: Sent Specimen: Body Fluid from Peritoneal Fluid     URINALYSIS [[id number]]  (Abnormal) Collected: 04/26/23 1649    Order Status: Completed Specimen: Urine Updated: 04/26/23 5311     Color DK Yellow     Character Clear     Specific Gravity 1.017     Ph 5.0     Glucose Negative mg/dL      Ketones Trace mg/dL      Protein Negative mg/dL      Bilirubin Moderate     Urobilinogen, Urine 1.0     Nitrite Positive     Leukocyte Esterase Trace      Occult Blood Trace     Micro Urine Req Microscopic            Imaging Results:  No orders to display       EKG  Results for orders placed or performed during the hospital encounter of 23   EKG (NOW)   Result Value Ref Range    Report       [facility]    Test Date:  2023  Pt Name:    JHON TURNER              Department: ER  MRN:        [id number]                      Room:       [facility] [place]  Gender:     Male                         Technician: [id number]  :        1975                   Requested By:MARTA NOLEN  Order #:    [id number]                    Reading MD: Marta Nolen    Measurements  Intervals                                Axis  Rate:       107                          P:          23  VT:         129                          QRS:        18  QRSD:       89                           T:          28  QT:         343  QTc:        458    Interpretive Statements  Sinus tachycardia  Abnormal R-wave progression, early transition  Baseline wander in lead(s) V1  Compared to ECG 10/19/2020 07:45:10  Atrial fibrillation no longer present  Atrial flutter no longer present  Intraventricular conduction delay no longer present  Electronically Signed On 2023 19:31:05 PST  by Marta Nolen         Current Medications    Current Facility-Administered Medications:     thiamine (Vitamin B-1) tablet 100 mg, 100 mg, Oral, DAILY, Lisy Sellers M.D., 100 mg at 23 0448    folic acid (FOLVITE) tablet 1 mg, 1 mg, Oral, DAILY, Lisy Sellers M.D., 1 mg at 23 044    therapeutic multivitamin-minerals (THERAGRAN-M) tablet 1 Tablet, 1 Tablet, Oral, DAILY, Lisy Sellers M.D., 1 Tablet at 23 044    senna-docusate (PERICOLACE or SENOKOT S) 8.6-50 MG per tablet 2 Tablet, 2 Tablet, Oral, BID, 2 Tablet at 23 0448 **AND** polyethylene glycol/lytes (MIRALAX) PACKET 1 Packet, 1 Packet, Oral, QDAY PRN **AND** magnesium hydroxide (MILK OF MAGNESIA)  suspension 30 mL, 30 mL, Oral, QDAY PRN **AND** bisacodyl (DULCOLAX) suppository 10 mg, 10 mg, Rectal, QDAY PRN, Allan Medina M.D.    omeprazole (PRILOSEC) capsule 40 mg, 40 mg, Oral, BID, Astrid Sousa M.D., 40 mg at 04/28/23 0448    lactulose 20 GM/30ML solution 30 mL, 30 mL, Oral, BID, Astrid Sousa M.D., 30 mL at 04/28/23 0448    ASSESSMENT/PLAN  Jimbo Santacruz is a 47 y.o. male with a PMH of alcohol use disorder - sober x3 years, cirrhosis, pancreatitis, and esophageal varices who was admitted on 4/26/2023 with hepatorenal syndrome and thrombocytopenia 2/2 end stage liver disease.    #  End stage liver disease  - The patient has not taken furosemide nor spironolactone for >10 days.  - S/p paracentesis with 8L removed and IV albumin  - Decreased concern for SBP given afebrile presentation.  - MELD-Na Score 29 which indicates 27-32% estimated 90-day mortality. Maddrey's Discriminant Function 47.3 points which indicates poor prognosis. >32 points indicates a patient may benefit from glucocorticoid therapy. Previous hepatitis and AFP levels WNL per chart review. Likely etiology alcohol use disorder.  - Continue lactulose, titrate to 2-3 bowel movements per day  - Kidney function improving with BUN 31 > 29. Cr 1.93 > 1.44.  - Ok to restart low dose furosemide and spirnolactone due to improving kidney function  - Ascitic fluid culture and cell count pending  - Outpatient GI referral to discuss transplant and ESLD management  - F/u with PCP re: BMP and kidney function in 1 week for diuretics management.     #  AROLDO (acute kidney injury)  - HRS Type 1: HRS-AROLDO. Baseline Cr 0.6 (0.6 x 1.5 = 0.91)  - Low urine osmalality, unremarkable lipid profile, unremarkable TSH, mildly low osmalality serum indicative of fluid overload etiology  - Kidney function improving with BUN 31 > 29. Cr 1.93 > 1.44.  - Ok to restart low dose furosemide and spirnolactone due to improving kidney function  - F/u with PCP re: BMP and  kidney function in 1 week for diuretics management.    #  Hyponatremia  - Admitted with 127 Na. Right now 128.  - Low urine osmalality, unremarkable lipid profile, unremarkable TSH, mildly low osmalality serum indicative of fluid overload etiology  - Currently asymptomatic.  - Concern for hypervolemic hyponatremia  - Ok to restart low dose furosemide and spirnolactone due to improving kidney function as above  - CTM BMP    #  Alcohol use disorder  - Sober x 3 years     #  Chronic anemia  - Baseline Hb 8-11.  - Hb today 8.6.  - Likely 2/2 cirrhosis.  - H/o of esophageal varices w/o active bleeding  - CTM CBC . Transfuse is < 7.     #  History of esophageal varices with bleeding  - Continue home omeprazole 40 mg daily BID     #  Thrombocytopenia  - Plt 46. Stable for the past 3 days.  - Likely 2/2 ESLD  - s/p plasmapharesis  - CTM CBC     Disposition: Pending discharge with diuretics. Symptomatically stable. Will need to F/U with outpatient PCP and GI to reassess Pt's kidney function on diuretics and management of ESLD.    Core Measures:   Fluids: None  Diet: Renal/high protein/2 gram sodium  Lines: IV  Abx: None  DVT prophylaxis: SCD  Code Status: Full

## 2023-04-28 NOTE — CARE PLAN
The patient is Stable - Low risk of patient condition declining or worsening    Shift Goals  Clinical Goals: safety, monitor labs , DC  Patient Goals: DC, rest    Progress made toward(s) clinical / shift goals:    Problem: Knowledge Deficit - Standard  Goal: Patient and family/care givers will demonstrate understanding of plan of care, disease process/condition, diagnostic tests and medications  Outcome: Progressing     Problem: Communication  Goal: The ability to communicate needs accurately and effectively will improve  Outcome: Progressing     Problem: Discharge Barriers/Planning  Goal: Patient's continuum of care needs are met  Outcome: Progressing     Problem: Fluid Volume  Goal: Fluid volume balance will be maintained  Outcome: Progressing

## 2023-04-28 NOTE — DISCHARGE SUMMARY
"[facility] Medicine Discharge Summary  Date: 4/28/2023 / Time: 1:59 PM     Patient:  Jimbo Santacruz - 47 y.o. male    PMD: Tomy Wilde M.D.    CONSULTANTS: None     Hospital Day # Hospital Day: 3    Date of Admit: 4/26/2023    Date of Discharge: 04/28/23      DISCHARGE SUMMARY:   Brief HPI:  \"Jimbo Santacruz is a 47 y.o. male with past medical history of end stage liver disease secondary to alcohol abuse who presented with worsening ascites. Patient reports for the past week he has been experiencing worsening abdominal distention. Also has been experiencing associated shortness of breath and poor urine output.  He states he has been out of his home medication of spirolactone and furosemide which has lead to the abdominal distention. He also has been eating salty foods. Denies confusion, muscle spasms, fever, chills, nausea, vomiting, or lower extremity edema. Denies current alcohol use, states last drink was over 3 years ago. Patient reports he currently is being seen by GI outpatient and is on a waiting list for liver transplant.      Per chart review, patient's last paracentesis was 3/06/23 for worsening ascites. He was also admitted 1/18/23 for  esophageal varices and was drinking alcohol at that time. Patient has been previously seen at [facility] in 2015 for alcoholic liver disease who advised GI follow up in [place] with Dr. Valencia. No previous notes from [place] GI doctor in chart. \" Per Dr. Burnham H&P    Hospital Problem List/Discharge Diagnosis:  * End stage liver disease (HCC)- (present on admission)  Assessment & Plan  The patient has not taken furosemide nor spironolactone for one week. Decreased concern for SBP given afebrile presentation. S/p paracentesis with 8L removed and IV albumin administration. MELD-Na Score 29 which indicates 27-32% estimated 90-day mortality. Maddrey's Discriminant Function 47.3 points which indicates poor prognosis. >32 points indicates a patient may benefit " from glucocorticoid therapy. Previous hepatitis and AFP levels WNL per chart review. Likely etiology alcohol use disorder.  - Resume lactulose, titrate to 2-3 bowel movements per day  - Will resume furosemide and spironolactone  - F/u on ascitic fluid culture and cell count. Will call the patient if results abnormal.    AROLDO (acute kidney injury) (HCC)  Assessment & Plan  Improving.  - 100 mL/hr fluids ordered. Monitor for fluid overload.     Hyponatremia  Assessment & Plan  Concern for low effective arterial blood volume.  - Downtrending sodium  - 100 mL/hr fluids ordered. Monitor for fluid overload.       Alcohol use disorder  Assessment & Plan  Former.  - Thiamine, folate, multivitamin supplementations ordered    Chronic anemia  Assessment & Plan   -Will continue to monitor CBC     History of esophageal varices with bleeding  Assessment & Plan  -Continue home medication of omeprazole 40 mg daily BID    Thrombocytopenia (HCC)- (present on admission)  Assessment & Plan  Pt presented with platelet count of 47. Likely due to end stage liver disease. S/p plasma pheresis.  -Trend CBC          Hospital Course:   The patient was admitted on 4/26/2023 with concern for end-stage liver disease with worsening ascites.  The patient underwent paracentesis in the emergency department which relieved 8 L of fluid.  Patient was admitted to [facility] for he was provided albumin.  The patient had an AROLDO that was monitored with daily labs.  It appears to be improving throughout his hospital stay.  The patient was resumed on low-dose furosemide and spinal lactone prior to discharge.  The patient is encouraged to follow-up with his primary care physician as soon as possible in order to further discuss his diuresis dosing and assess whether he would benefit from increase in dosage of his diuresis.  Additionally, he was encouraged to have a low-salt diet.  He is also encouraged to establish care with a gastroenterologist.  The patient  expressed understanding.    Procedures:  Paracentesis     Significant Imaging Findings:  No orders to display       Significant Laboratory Findings:  Results for orders placed or performed during the hospital encounter of 04/26/23   CBC WITH DIFFERENTIAL   Result Value Ref Range    WBC 3.5 (L) 4.8 - 10.8 K/uL    RBC 3.68 (L) 4.70 - 6.10 M/uL    Hemoglobin 10.8 (L) 14.0 - 18.0 g/dL    Hematocrit 31.9 (L) 42.0 - 52.0 %    MCV 86.7 81.4 - 97.8 fL    MCH 29.3 27.0 - 33.0 pg    MCHC 33.9 33.7 - 35.3 g/dL    RDW 63.1 (H) 35.9 - 50.0 fL    Platelet Count 47 (L) 164 - 446 K/uL    MPV 9.9 9.0 - 12.9 fL    Neutrophils-Polys 75.20 (H) 44.00 - 72.00 %    Lymphocytes 11.60 (L) 22.00 - 41.00 %    Monocytes 9.70 0.00 - 13.40 %    Eosinophils 2.60 0.00 - 6.90 %    Basophils 0.90 0.00 - 1.80 %    Immature Granulocytes 0.00 0.00 - 0.90 %    Nucleated RBC 0.00 /100 WBC    Neutrophils (Absolute) 2.65 1.82 - 7.42 K/uL    Lymphs (Absolute) 0.41 (L) 1.00 - 4.80 K/uL    Monos (Absolute) 0.34 0.00 - 0.85 K/uL    Eos (Absolute) 0.09 0.00 - 0.51 K/uL    Baso (Absolute) 0.03 0.00 - 0.12 K/uL    Immature Granulocytes (abs) 0.00 0.00 - 0.11 K/uL    NRBC (Absolute) 0.00 K/uL   COMP METABOLIC PANEL   Result Value Ref Range    Sodium 127 (L) 135 - 145 mmol/L    Potassium 4.5 3.6 - 5.5 mmol/L    Chloride 100 96 - 112 mmol/L    Co2 15 (L) 20 - 33 mmol/L    Anion Gap 12.0 7.0 - 16.0    Glucose 123 (H) 65 - 99 mg/dL    Bun 30 (H) 8 - 22 mg/dL    Creatinine 2.29 (H) 0.50 - 1.40 mg/dL    Calcium 8.4 (L) 8.5 - 10.5 mg/dL    AST(SGOT) 81 (H) 12 - 45 U/L    ALT(SGPT) 27 2 - 50 U/L    Alkaline Phosphatase 163 (H) 30 - 99 U/L    Total Bilirubin 2.7 (H) 0.1 - 1.5 mg/dL    Albumin 3.0 (L) 3.2 - 4.9 g/dL    Total Protein 7.4 6.0 - 8.2 g/dL    Globulin 4.4 (H) 1.9 - 3.5 g/dL    A-G Ratio 0.7 g/dL   LIPASE   Result Value Ref Range    Lipase 184 (H) 11 - 82 U/L   URINALYSIS    Specimen: Urine   Result Value Ref Range    Color DK Yellow     Character Clear      Specific Gravity 1.017 <1.035    Ph 5.0 5.0 - 8.0    Glucose Negative Negative mg/dL    Ketones Trace (A) Negative mg/dL    Protein Negative Negative mg/dL    Bilirubin Moderate (A) Negative    Urobilinogen, Urine 1.0 Negative    Nitrite Positive (A) Negative    Leukocyte Esterase Trace (A) Negative    Occult Blood Trace (A) Negative    Micro Urine Req Microscopic    CORRECTED CALCIUM   Result Value Ref Range    Correct Calcium 9.2 8.5 - 10.5 mg/dL   ESTIMATED GFR   Result Value Ref Range    GFR (CKD-EPI) 35 (A) >60 mL/min/1.73 m 2   PLATELETS REQUEST   Result Value Ref Range    Component P       P07                 Plts,Pheresis       [id number]   transfused   04/26/23   17:18      Product Type Platelets  Pheresis LR     Dispense Status transfused     Unit Number (Barcoded) [id number]     Product Code (Barcoded) Q3850I51     Blood Type (Barcoded) 6200    URINE MICROSCOPIC (W/UA)   Result Value Ref Range    WBC 0-2 (A) /hpf    RBC 0-2 (A) /hpf    Bacteria Few (A) None /hpf    Epithelial Cells Few /hpf    Mucous Threads Few /hpf    Amorphous Crystal Present /hpf    Hyaline Cast 3-5 (A) /lpf   Urine Sodium Random   Result Value Ref Range    Sodium, Urine -per volume 57 mmol/L   Urine Creatinine Random   Result Value Ref Range    Creatinine, Random Urine 37.87 mg/dL   Prothrombin Time   Result Value Ref Range    PT 23.7 (H) 12.0 - 14.6 sec    INR 2.19 (H) 0.87 - 1.13   Basic Metabolic Panel   Result Value Ref Range    Sodium 130 (L) 135 - 145 mmol/L    Potassium 3.8 3.6 - 5.5 mmol/L    Chloride 101 96 - 112 mmol/L    Co2 15 (L) 20 - 33 mmol/L    Glucose 135 (H) 65 - 99 mg/dL    Bun 31 (H) 8 - 22 mg/dL    Creatinine 1.93 (H) 0.50 - 1.40 mg/dL    Calcium 8.1 (L) 8.5 - 10.5 mg/dL    Anion Gap 14.0 7.0 - 16.0   CBC WITH DIFFERENTIAL   Result Value Ref Range    WBC 2.3 (L) 4.8 - 10.8 K/uL    RBC 2.98 (L) 4.70 - 6.10 M/uL    Hemoglobin 8.7 (L) 14.0 - 18.0 g/dL    Hematocrit 26.0 (L) 42.0 - 52.0 %    MCV 87.2 81.4 -  97.8 fL    MCH 29.2 27.0 - 33.0 pg    MCHC 33.5 (L) 33.7 - 35.3 g/dL    RDW 62.9 (H) 35.9 - 50.0 fL    Platelet Count 44 (L) 164 - 446 K/uL    MPV 10.3 9.0 - 12.9 fL    Neutrophils-Polys 65.00 44.00 - 72.00 %    Lymphocytes 16.20 (L) 22.00 - 41.00 %    Monocytes 14.50 (H) 0.00 - 13.40 %    Eosinophils 3.00 0.00 - 6.90 %    Basophils 0.90 0.00 - 1.80 %    Immature Granulocytes 0.40 0.00 - 0.90 %    Nucleated RBC 0.00 /100 WBC    Neutrophils (Absolute) 1.52 (L) 1.82 - 7.42 K/uL    Lymphs (Absolute) 0.38 (L) 1.00 - 4.80 K/uL    Monos (Absolute) 0.34 0.00 - 0.85 K/uL    Eos (Absolute) 0.07 0.00 - 0.51 K/uL    Baso (Absolute) 0.02 0.00 - 0.12 K/uL    Immature Granulocytes (abs) 0.01 0.00 - 0.11 K/uL    NRBC (Absolute) 0.00 K/uL   ESTIMATED GFR   Result Value Ref Range    GFR (CKD-EPI) 43 (A) >60 mL/min/1.73 m 2   OSMOLALITY SERUM   Result Value Ref Range    Osmolality Serum 274 (L) 278 - 298 mOsm/kg H2O   TSH WITH REFLEX TO FT4   Result Value Ref Range    TSH 3.240 0.380 - 5.330 uIU/mL   Lipid Profile   Result Value Ref Range    Cholesterol,Tot 78 (L) 100 - 199 mg/dL    Triglycerides 64 0 - 149 mg/dL    HDL 33 (A) >=40 mg/dL    LDL 32 <100 mg/dL   OSMOLALITY URINE   Result Value Ref Range    Osmolality Urine 258 (L) 300 - 900 mOsm/kg H2O   Comp Metabolic Panel   Result Value Ref Range    Sodium 128 (L) 135 - 145 mmol/L    Potassium 3.4 (L) 3.6 - 5.5 mmol/L    Chloride 101 96 - 112 mmol/L    Co2 16 (L) 20 - 33 mmol/L    Anion Gap 11.0 7.0 - 16.0    Glucose 132 (H) 65 - 99 mg/dL    Bun 29 (H) 8 - 22 mg/dL    Creatinine 1.44 (H) 0.50 - 1.40 mg/dL    Calcium 8.0 (L) 8.5 - 10.5 mg/dL    AST(SGOT) 43 12 - 45 U/L    ALT(SGPT) 18 2 - 50 U/L    Alkaline Phosphatase 107 (H) 30 - 99 U/L    Total Bilirubin 2.2 (H) 0.1 - 1.5 mg/dL    Albumin 3.0 (L) 3.2 - 4.9 g/dL    Total Protein 6.0 6.0 - 8.2 g/dL    Globulin 3.0 1.9 - 3.5 g/dL    A-G Ratio 1.0 g/dL   CBC WITHOUT DIFFERENTIAL   Result Value Ref Range    WBC 2.4 (L) 4.8 - 10.8 K/uL     RBC 2.90 (L) 4.70 - 6.10 M/uL    Hemoglobin 8.6 (L) 14.0 - 18.0 g/dL    Hematocrit 25.2 (L) 42.0 - 52.0 %    MCV 86.9 81.4 - 97.8 fL    MCH 29.7 27.0 - 33.0 pg    MCHC 34.1 33.7 - 35.3 g/dL    RDW 64.2 (H) 35.9 - 50.0 fL    Platelet Count 46 (L) 164 - 446 K/uL    MPV 10.1 9.0 - 12.9 fL   CORRECTED CALCIUM   Result Value Ref Range    Correct Calcium 8.8 8.5 - 10.5 mg/dL   ESTIMATED GFR   Result Value Ref Range    GFR (CKD-EPI) 61 >60 mL/min/1.73 m 2         Disposition:  Discharge to: home with wife    Follow Up:  With your primary care physician as soon as possible in order to discuss diuresis dosing as you will likely need an increase  Please establish care with a gastroenterologist.  A referral has been made on your behalf.    Discharge  Medications:      Medication List        START taking these medications        Instructions   folic acid 1 MG Tabs  Start taking on: April 29, 2023  Commonly known as: FOLVITE   Take 1 Tablet by mouth every day.  Dose: 1 mg     lactulose 10 GM/15ML Soln   Take 30 mL by mouth 2 times a day.     therapeutic multivitamin-minerals Tabs  Start taking on: April 29, 2023   Take 1 Tablet by mouth every day.  Dose: 1 Tablet     thiamine 100 MG tablet  Start taking on: April 29, 2023  Commonly known as: THIAMINE   Take 1 Tablet by mouth every day for 3 days.  Dose: 100 mg            CHANGE how you take these medications        Instructions   furosemide 20 MG Tabs  What changed:   medication strength  how much to take  Commonly known as: LASIX   Take 1 Tablet by mouth every day for 30 days.  Dose: 20 mg     spironolactone 50 MG Tabs  What changed:   medication strength  how much to take  Commonly known as: ALDACTONE   Take 1 Tablet by mouth every day.  Dose: 50 mg            CONTINUE taking these medications        Instructions   omeprazole 40 MG delayed-release capsule  Commonly known as: PRILOSEC   Take 1 Cap by mouth 2 Times a Day.  Dose: 40 mg            CC: Tomy Wilde  M.D.

## 2023-04-28 NOTE — PROGRESS NOTES
Assumed patient care and received report from Livan ALAN RN. Pt resting comfortably. No s/s of distress. Respirations are even and unlabored on RA. Pt denies/reports pain at this time.VS stable, call light and belongings within reach.

## 2023-04-28 NOTE — CARE PLAN
The patient is Stable - Low risk of patient condition declining or worsening    Shift Goals  Clinical Goals: Discharge?  Patient Goals: Discharge?    Progress made toward(s) clinical / shift goals:  discharging today. Discussed pt's care with resident       Problem: Knowledge Deficit - Standard  Goal: Patient and family/care givers will demonstrate understanding of plan of care, disease process/condition, diagnostic tests and medications  Outcome: Met     Problem: Communication  Goal: The ability to communicate needs accurately and effectively will improve  Outcome: Met     Problem: Discharge Barriers/Planning  Goal: Patient's continuum of care needs are met  Outcome: Met     Problem: Fluid Volume  Goal: Fluid volume balance will be maintained  Outcome: Met

## 2023-04-28 NOTE — PROGRESS NOTES
Discussed pt's care with resident. Pt to discharge, pt states he is ready for discharge. Discharge instructions given to patient at bedside, verbalizes understanding and states plans for follow-up. Given meds to beds meds, educated concerning meds to beds meds. IV cathlon removed. All belongings accounted for, all questions answered at this time.  Pt walked out by Nurse Apprentice, pt ambulates without difficulty. Denies further needs.

## (undated) DEVICE — TUBE E-T HI-LO CUFF 7.0MM (10EA/PK)

## (undated) DEVICE — BITE BLOCK ADULT 60FR (100EA/CA)

## (undated) DEVICE — CANNULA O2 COMFORT SOFT EAR ADULT 7 FT TUBING (50/CA)

## (undated) DEVICE — FILM CASSETTE ENDO

## (undated) DEVICE — SPEEDBAND SUPERVIEW SUPER 7 (4/BX)

## (undated) DEVICE — SOD. CHL 10CC SYRINGE PREFILL - W/10 CC (30/BX)

## (undated) DEVICE — MANIFOLD NEPTUNE 1 PORT (20/PK)

## (undated) DEVICE — ELECTRODE 850 FOAM ADHESIVE - HYDROGEL RADIOTRNSPRNT (50/PK)

## (undated) DEVICE — TUBE CONNECTING SUCTION - CLEAR PLASTIC STERILE 72 IN (50EA/CA)

## (undated) DEVICE — TRAP POLYP E-TRAP (25EA/BX)

## (undated) DEVICE — KIT CUSTOM PROCEDURE SINGLE FOR ENDO  (15/CA)

## (undated) DEVICE — SENSOR OXIMETER ADULT SPO2 RD SET (20EA/BX)

## (undated) DEVICE — BITE BLOCK, DISP.

## (undated) DEVICE — CANISTER SUCTION RIGID RED 1500CC (40EA/CA)

## (undated) DEVICE — KIT ANESTHESIA W/CIRCUIT & 3/LT BAG W/FILTER (20EA/CA)

## (undated) DEVICE — TOWEL STOP TIMEOUT SAFETY FLAG (40EA/CA)

## (undated) DEVICE — SPEEDBAND SUPERVIEW SUPER 7

## (undated) DEVICE — WATER IRRIGATION STERILE 1000ML (12EA/CA)

## (undated) DEVICE — SET LEADWIRE 5 LEAD BEDSIDE DISPOSABLE ECG (1SET OF 5/EA)

## (undated) DEVICE — SUCTION INSTRUMENT YANKAUER BULBOUS TIP W/O VENT (50EA/CA)